# Patient Record
Sex: FEMALE | Race: WHITE | Employment: OTHER | URBAN - METROPOLITAN AREA
[De-identification: names, ages, dates, MRNs, and addresses within clinical notes are randomized per-mention and may not be internally consistent; named-entity substitution may affect disease eponyms.]

---

## 2017-01-13 RX ORDER — LANCETS
EACH MISCELLANEOUS
Qty: 100 EACH | Refills: 0 | Status: SHIPPED | OUTPATIENT
Start: 2017-01-13 | End: 2017-10-02 | Stop reason: ALTCHOICE

## 2017-02-14 DIAGNOSIS — I10 ESSENTIAL HYPERTENSION: ICD-10-CM

## 2017-02-14 DIAGNOSIS — E11.21 DIABETIC NEPHROPATHY ASSOCIATED WITH TYPE 2 DIABETES MELLITUS (HCC): ICD-10-CM

## 2017-02-14 RX ORDER — LISINOPRIL 10 MG/1
10 TABLET ORAL DAILY
Qty: 30 TABLET | Refills: 3 | Status: SHIPPED | OUTPATIENT
Start: 2017-02-14 | End: 2017-06-30 | Stop reason: SDUPTHER

## 2017-06-30 DIAGNOSIS — E11.21 DIABETIC NEPHROPATHY ASSOCIATED WITH TYPE 2 DIABETES MELLITUS (HCC): ICD-10-CM

## 2017-06-30 DIAGNOSIS — I10 ESSENTIAL HYPERTENSION: ICD-10-CM

## 2017-06-30 RX ORDER — LISINOPRIL 10 MG/1
TABLET ORAL
Qty: 30 TABLET | Refills: 0 | Status: SHIPPED | OUTPATIENT
Start: 2017-06-30 | End: 2017-08-06 | Stop reason: SDUPTHER

## 2017-09-05 DIAGNOSIS — E11.21 DIABETIC NEPHROPATHY ASSOCIATED WITH TYPE 2 DIABETES MELLITUS (HCC): ICD-10-CM

## 2017-09-05 DIAGNOSIS — I10 ESSENTIAL HYPERTENSION: ICD-10-CM

## 2017-09-05 RX ORDER — LISINOPRIL 10 MG/1
TABLET ORAL
Qty: 30 TABLET | Refills: 0 | Status: SHIPPED | OUTPATIENT
Start: 2017-09-05 | End: 2017-10-09 | Stop reason: SDUPTHER

## 2017-10-05 ENCOUNTER — OFFICE VISIT (OUTPATIENT)
Dept: FAMILY MEDICINE CLINIC | Age: 82
End: 2017-10-05

## 2017-10-05 VITALS
SYSTOLIC BLOOD PRESSURE: 146 MMHG | WEIGHT: 108.2 LBS | HEIGHT: 60 IN | DIASTOLIC BLOOD PRESSURE: 70 MMHG | BODY MASS INDEX: 21.24 KG/M2

## 2017-10-05 DIAGNOSIS — Z23 NEEDS FLU SHOT: ICD-10-CM

## 2017-10-05 DIAGNOSIS — I42.0 CONGESTIVE CARDIOMYOPATHY (HCC): ICD-10-CM

## 2017-10-05 DIAGNOSIS — I35.0 AORTIC VALVE STENOSIS, UNSPECIFIED ETIOLOGY: ICD-10-CM

## 2017-10-05 DIAGNOSIS — I10 ESSENTIAL HYPERTENSION: Primary | ICD-10-CM
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
## 2017-10-05 DIAGNOSIS — L89.311 DECUBITUS ULCER OF RIGHT BUTTOCK, STAGE 1: ICD-10-CM

## 2017-10-05 DIAGNOSIS — Z09 HOSPITAL DISCHARGE FOLLOW-UP: ICD-10-CM

## 2017-10-05 PROCEDURE — 1123F ACP DISCUSS/DSCN MKR DOCD: CPT | Performed by: FAMILY MEDICINE

## 2017-10-05 PROCEDURE — G8484 FLU IMMUNIZE NO ADMIN: HCPCS | Performed by: FAMILY MEDICINE

## 2017-10-05 PROCEDURE — 4040F PNEUMOC VAC/ADMIN/RCVD: CPT | Performed by: FAMILY MEDICINE

## 2017-10-05 PROCEDURE — G8427 DOCREV CUR MEDS BY ELIG CLIN: HCPCS | Performed by: FAMILY MEDICINE

## 2017-10-05 PROCEDURE — 1090F PRES/ABSN URINE INCON ASSESS: CPT | Performed by: FAMILY MEDICINE

## 2017-10-05 PROCEDURE — 90662 IIV NO PRSV INCREASED AG IM: CPT | Performed by: FAMILY MEDICINE

## 2017-10-05 PROCEDURE — 1036F TOBACCO NON-USER: CPT | Performed by: FAMILY MEDICINE

## 2017-10-05 PROCEDURE — 99214 OFFICE O/P EST MOD 30 MIN: CPT | Performed by: FAMILY MEDICINE

## 2017-10-05 PROCEDURE — G8420 CALC BMI NORM PARAMETERS: HCPCS | Performed by: FAMILY MEDICINE

## 2017-10-05 PROCEDURE — G0008 ADMIN INFLUENZA VIRUS VAC: HCPCS | Performed by: FAMILY MEDICINE

## 2017-10-05 RX ORDER — FUROSEMIDE 20 MG/1
20 TABLET ORAL DAILY
Qty: 60 TABLET | Refills: 3 | Status: SHIPPED | OUTPATIENT
Start: 2017-10-05 | End: 2018-06-02 | Stop reason: SDUPTHER

## 2017-10-05 ASSESSMENT — PATIENT HEALTH QUESTIONNAIRE - PHQ9
SUM OF ALL RESPONSES TO PHQ9 QUESTIONS 1 & 2: 2
1. LITTLE INTEREST OR PLEASURE IN DOING THINGS: 1
SUM OF ALL RESPONSES TO PHQ QUESTIONS 1-9: 2
2. FEELING DOWN, DEPRESSED OR HOPELESS: 1

## 2017-10-05 ASSESSMENT — ENCOUNTER SYMPTOMS
BACK PAIN: 1
SHORTNESS OF BREATH: 1
ABDOMINAL PAIN: 0
CHOKING: 0

## 2017-10-05 NOTE — MR AVS SNAPSHOT
After Visit Summary             Shayla Schultz   10/5/2017 11:15 AM   Office Visit    Description:  Female : 1930   Provider:  Sandy Cuellar DO   Department:  [facility]              Your Follow-Up and Future Appointments         Below is a list of your follow-up and future appointments. This may not be a complete list as you may have made appointments directly with providers that we are not aware of or your providers may have made some for you. Please call your providers to confirm appointments. It is important to keep your appointments. Please bring your current insurance card, photo ID, co-pay, and all medication bottles to your appointment. If self-pay, payment is expected at the time of service. Your To-Do List     Future Appointments Provider Department Dept Phone    10/10/2017 10:30 AM [facility] [phone]    10/18/2017 1:00 PM Mariela Hallman  Family Medicine [phone]    Please arrive 15 minutes prior to appointment, bring photo ID and insurance card. Follow-Up    Return in about 2 weeks (around 10/19/2017). Information from Your Visit        Department     Name Address Phone Fax    [address] [facility] [address] [facility]  [address] [phone]      You Were Seen for:         Felicita 38 discharge follow-up   [[id number]]         Vital Signs     Blood Pressure Height Weight Body Mass Index Smoking Status       146/70 5' (1.524 m) 108 lb 3.2 oz (49.1 kg) 21.13 kg/m2 Never Smoker          Today's Medication Changes          These changes are accurate as of: 10/5/17  4:51 PM.  If you have any questions, ask your nurse or doctor. START taking these medications           collagenase 250 UNIT/GM ointment   Commonly known as:  SANTYL   Instructions:  Apply topically every three days   Quantity:  30 g   Refills:  1   Started by:   Sandy Cuellar DO furosemide 20 MG tablet   Commonly known as:  LASIX   Instructions: Take 1 tablet by mouth daily   Quantity:  60 tablet   Refills:  3   Started by: Minnie Marshall, DO            Where to Get Your Medications      These medications were sent to [facility], [address] [place] [address] [phone]  [address] [facility], [address] [place] [id number]     Phone:  [phone]     collagenase 250 UNIT/GM ointment    furosemide 20 MG tablet               Your Current Medications Are              furosemide (LASIX) 20 MG tablet Take 1 tablet by mouth daily    collagenase (SANTYL) 250 UNIT/GM ointment Apply topically every three days    Acetaminophen-Codeine (TYLENOL WITH CODEINE #3 PO) Take by mouth    Elastic Bandages & Supports (MEDICAL COMPRESSION STOCKINGS) MISC 20-30 mmHg compressions strength. Dispense and Fit. 1 box    lisinopril (PRINIVIL;ZESTRIL) 10 MG tablet TAKE 1 TABLET BY MOUTH DAILY    glucose blood VI test strips (ACCU-CHEK EFE PLUS) strip 1 each by In Vitro route daily As needed. Allergies           No Known Allergies      We Ordered/Performed the following           Echocardiogram complete     INFLUENZA, HIGH DOSE, 65 YRS +, IM, PF, PREFILL SYR, 0.5ML (FLUZONE HD)     [facility]     Scheduling Instructions:    Please call the number below to schedule an appointment. [facility]  [place] [address]   Telephone: [phone]  Fax: [phone]    Comments: The patient can be scheduled with any member of the group, including the provider with the first available appointments.           Additional Information        Basic Information     Date Of Birth Sex Race Ethnicity Preferred Language    6/26/1930 Female White Unavailable/Unknown English      Problem List as of 10/5/2017  Date Reviewed: 10/7/2016                Aortic valve stenosis    Essential hypertension

## 2017-10-05 NOTE — PROGRESS NOTES
Vaccine Information Sheet, \"Influenza - Inactivated\"  given to Amina Davison, or parent/legal guardian of  Amina Davison and verbalized understanding. Patient responses:    Have you ever had a reaction to a flu vaccine? No  Are you able to eat eggs without adverse effects? Yes  Do you have any current illness? No  Have you ever had [place] Syndrome? No    Flu vaccine given per order. Please see immunization tab.
visit:    Hospital discharge follow-up    Congestive cardiomyopathy ([facility] [address] needs echo for lvf  -     Echocardiogram complete    Lasix started    Aortic valve stenosis, unspecified etiology, marked systolic murmur  -     Echocardiogram complete    Falls, subsequent encounter  -     [facility]    Essential hypertension, sub optimal control    Needs flu shot    Decubitus ulcer of right buttock, stage 1    Santyl cream to wounds    Padding     Avoid sitting h]darlin surfaces   improve diet and nutrition                  Plan:       On the basis of positive falls risk screening, assessment and plan is as follows: home safety tips provided, referral to physical medicine provided for [address]  Concern for poss ischemic cardio vs advanced aortic stenosis  rto 2 week

## 2017-10-10 ENCOUNTER — HOSPITAL ENCOUNTER (OUTPATIENT)
Dept: NON INVASIVE DIAGNOSTICS | Age: 82
Discharge: HOME | End: 2017-10-10
Attending: FAMILY MEDICINE | Admitting: FAMILY MEDICINE

## 2017-10-10 DIAGNOSIS — I42.0 DILATED CARDIOMYOPATHY (HCC): ICD-10-CM

## 2017-10-10 LAB
LV EF: 60 %
LVEF MODALITY: NORMAL

## 2017-10-17 DIAGNOSIS — I35.0 AORTIC VALVE STENOSIS, ETIOLOGY OF CARDIAC VALVE DISEASE UNSPECIFIED: Primary | ICD-10-CM

## 2017-10-18 ENCOUNTER — OFFICE VISIT (OUTPATIENT)
Dept: FAMILY MEDICINE CLINIC | Age: 82
End: 2017-10-18

## 2017-10-18 VITALS
HEIGHT: 60 IN | BODY MASS INDEX: 21.2 KG/M2 | DIASTOLIC BLOOD PRESSURE: 70 MMHG | SYSTOLIC BLOOD PRESSURE: 160 MMHG | WEIGHT: 108 LBS

## 2017-10-18 DIAGNOSIS — Z23 NEED FOR PNEUMOCOCCAL VACCINATION: ICD-10-CM

## 2017-10-18 DIAGNOSIS — I35.0 NONRHEUMATIC AORTIC VALVE STENOSIS: Primary | ICD-10-CM

## 2017-10-18 DIAGNOSIS — I10 ESSENTIAL HYPERTENSION: ICD-10-CM

## 2017-10-18 PROCEDURE — G8484 FLU IMMUNIZE NO ADMIN: HCPCS | Performed by: FAMILY MEDICINE

## 2017-10-18 PROCEDURE — 99214 OFFICE O/P EST MOD 30 MIN: CPT | Performed by: FAMILY MEDICINE

## 2017-10-18 PROCEDURE — G8420 CALC BMI NORM PARAMETERS: HCPCS | Performed by: FAMILY MEDICINE

## 2017-10-18 PROCEDURE — 90732 PPSV23 VACC 2 YRS+ SUBQ/IM: CPT | Performed by: FAMILY MEDICINE

## 2017-10-18 PROCEDURE — 1123F ACP DISCUSS/DSCN MKR DOCD: CPT | Performed by: FAMILY MEDICINE

## 2017-10-18 PROCEDURE — G0009 ADMIN PNEUMOCOCCAL VACCINE: HCPCS | Performed by: FAMILY MEDICINE

## 2017-10-18 PROCEDURE — 1090F PRES/ABSN URINE INCON ASSESS: CPT | Performed by: FAMILY MEDICINE

## 2017-10-18 PROCEDURE — 4040F PNEUMOC VAC/ADMIN/RCVD: CPT | Performed by: FAMILY MEDICINE

## 2017-10-18 PROCEDURE — G8427 DOCREV CUR MEDS BY ELIG CLIN: HCPCS | Performed by: FAMILY MEDICINE

## 2017-10-18 PROCEDURE — 1036F TOBACCO NON-USER: CPT | Performed by: FAMILY MEDICINE

## 2017-10-18 ASSESSMENT — ENCOUNTER SYMPTOMS
SHORTNESS OF BREATH: 1
CHEST TIGHTNESS: 0

## 2017-10-18 NOTE — PROGRESS NOTES
Subjective:      Patient ID: Lisa Breaux is a 80 y.o. female.     HPI    Review of Systems    Objective:   Physical Exam    Assessment:            Plan:

## 2017-10-18 NOTE — PROGRESS NOTES
Subjective:      Patient ID: Aye Don is a 80 y.o. female. HPI  2 week follow up from recent hospitalization   echo completed recently found severe AS with normal LV size and function, gradient 43 mmHg    EF 60%  Cont have general weakness with little stamina   denies actual chest pain   pedal edema improved with diuretic    Current Outpatient Prescriptions   Medication Sig      lisinopril (PRINIVIL;ZESTRIL) 10 MG tablet TAKE 1 TABLET BY MOUTH DAILY      furosemide (LASIX) 20 MG tablet Take 1 tablet by mouth daily      Acetaminophen-Codeine (TYLENOL WITH CODEINE #3 PO) Take by mouth      Elastic Bandages & Supports (MEDICAL COMPRESSION STOCKINGS) MISC 20-30 mmHg compressions strength. Dispense and Fit. 1 box      glucose blood VI test strips (ACCU-CHEK EFE PLUS) strip 1 each by In Vitro route daily As needed. No current facility-administered medications for this visit. Review of Systems   Constitutional: Positive for fatigue. Respiratory: Positive for shortness of breath. Negative for chest tightness. Cardiovascular: Negative for chest pain. Musculoskeletal: Positive for arthralgias. Neurological: Positive for weakness. A complete 14 point  review of systems was completed; pertinent positives are noted in HPI    Vitals:    10/18/17 1254   BP: (!) 160/70   Weight: 108 lb (49 kg)   Height: 5' (1.524 m)     Body mass index is 21.09 kg/m². Wt Readings from Last 3 Encounters:   10/18/17 108 lb (49 kg)   10/05/17 108 lb 3.2 oz (49.1 kg)   10/02/17 112 lb 7 oz (51 kg)     BP Readings from Last 3 Encounters:   10/18/17 (!) 160/70   10/05/17 (!) 146/70   10/02/17 (!) 193/86        BP (!) 160/70   Ht 5' (1.524 m)   Wt 108 lb (49 kg)   BMI 21.09 kg/m²     Objective:   Physical Exam   Constitutional: She is oriented to person, place, and time. No distress.    HENT:   Mouth/Throat: Oropharynx is clear and moist.   Eyes: Conjunctivae are normal.   Cardiovascular: Normal rate and regular rhythm. Murmur heard. Mild pedal edema   Pulmonary/Chest: Effort normal.   Abdominal: Soft. Neurological: She is alert and oriented to person, place, and time. Assessment:      Assessment/Plan:  Raymundo Rojas was seen today for follow-up. Diagnoses and all orders for this visit:    Nonrheumatic aortic valve stenosis  -     Yonny Morales MD    Essential hypertension    Need for pneumococcal vaccination  -     Pneumococcal polysaccharide vaccine 23-valent greater than or equal to 3yo subcutaneous/IM            Plan:       Will ask patient to see dr Donny Armstrong reg options given the AS     EF preserved  Cont meds   pneuovax given  Spent 30 minutes with patient and/or family in which greater than half the time was focused on counseling and educating patient and/or  family regarding current health isues     Daughter present

## 2017-10-26 ENCOUNTER — OFFICE VISIT (OUTPATIENT)
Dept: CARDIOLOGY CLINIC | Age: 82
End: 2017-10-26

## 2017-10-26 VITALS
OXYGEN SATURATION: 98 % | SYSTOLIC BLOOD PRESSURE: 128 MMHG | BODY MASS INDEX: 20.22 KG/M2 | WEIGHT: 103 LBS | HEART RATE: 75 BPM | HEIGHT: 60 IN | DIASTOLIC BLOOD PRESSURE: 82 MMHG

## 2017-10-26 DIAGNOSIS — I10 ESSENTIAL HYPERTENSION: ICD-10-CM

## 2017-10-26 DIAGNOSIS — I35.0 SEVERE AORTIC STENOSIS: Primary | ICD-10-CM

## 2017-10-26 DIAGNOSIS — R55 SYNCOPE AND COLLAPSE: ICD-10-CM

## 2017-10-26 PROCEDURE — 99205 OFFICE O/P NEW HI 60 MIN: CPT | Performed by: INTERNAL MEDICINE

## 2017-10-26 PROCEDURE — 1123F ACP DISCUSS/DSCN MKR DOCD: CPT | Performed by: INTERNAL MEDICINE

## 2017-10-26 PROCEDURE — 4040F PNEUMOC VAC/ADMIN/RCVD: CPT | Performed by: INTERNAL MEDICINE

## 2017-10-26 PROCEDURE — G8484 FLU IMMUNIZE NO ADMIN: HCPCS | Performed by: INTERNAL MEDICINE

## 2017-10-26 PROCEDURE — 1090F PRES/ABSN URINE INCON ASSESS: CPT | Performed by: INTERNAL MEDICINE

## 2017-10-26 PROCEDURE — G8427 DOCREV CUR MEDS BY ELIG CLIN: HCPCS | Performed by: INTERNAL MEDICINE

## 2017-10-26 PROCEDURE — 1036F TOBACCO NON-USER: CPT | Performed by: INTERNAL MEDICINE

## 2017-10-26 PROCEDURE — G8420 CALC BMI NORM PARAMETERS: HCPCS | Performed by: INTERNAL MEDICINE

## 2017-10-26 PROCEDURE — 93000 ELECTROCARDIOGRAM COMPLETE: CPT | Performed by: INTERNAL MEDICINE

## 2017-10-26 NOTE — PROGRESS NOTES
syncope, heart racing, palpitations, dizziness, lightheaded, PND, orthopnea, bilateral lower extremities pain, cramping or fatigue. Past Medical History:   has a past medical history of Hypertension. Surgical History:   has no past surgical history on file. Social History:   reports that she has never smoked. She does not have any smokeless tobacco history on file. Family History:  family history is not on file. Home Medications:  Were reviewed and are listed in nursing record and/or below  Prior to Admission medications    Medication Sig Start Date End Date Taking? Authorizing Provider   lisinopril (PRINIVIL;ZESTRIL) 10 MG tablet TAKE 1 TABLET BY MOUTH DAILY 10/9/17   Rosaura Horner DO   furosemide (LASIX) 20 MG tablet Take 1 tablet by mouth daily 10/5/17   Maggie Vaz DO   Acetaminophen-Codeine (TYLENOL WITH CODEINE #3 PO) Take by mouth    Historical Provider, MD   Elastic Bandages & Supports (MEDICAL COMPRESSION STOCKINGS) MISC 20-30 mmHg compressions strength. Dispense and Fit. 1 box 10/2/17   Maribeth Hammond MD   glucose blood VI test strips (ACCU-CHEK EFE PLUS) strip 1 each by In Vitro route daily As needed. 10/7/16   Maggie Vaz DO        CURRENT Medications:    No current facility-administered medications for this visit. Allergies:  Review of patient's allergies indicates no known allergies. Review of Systems:     Constitutional: negative  Eyes: negative  Ears, nose, mouth, throat, and face: negative  Respiratory: negative  Cardiovascular: negative  Gastrointestinal: negative  Genitourinary:negative  Integument/breast: negative  Hematologic/lymphatic: negative  Musculoskeletal:negative  Neurological: negative  Behavioral/Psych: negative  Endocrine: negative  Allergic/Immunologic: negative           Objective:     PHYSICAL EXAM:      There were no vitals filed for this visit.  Weight: 103 lb (46.7 kg) (shoes on)       General Appearance:  Alert, cooperative, no regurgitation is present.   Left atrium is of normal size.   Severe Aortic stenosis with mean gradient of 43 mmHg and mild   regurgitation.   Normal right ventricular size. Assessment and Plan     -Severe Aortic Stenosis  Symptomatic   Syncope and collapse-Walking from the kitchen to living room   STS risk 4.931% preliminary without including frailty   Symptomatic     Good functional status: lives alone, light cooking for herself, was doing light house work prior to fall   Drinks ensure shakes-poor appetite   She has been very sedentary since syncope with right arm fx. She also reports recent forgetfulness reviewed echo 10/10/17 at length with patient and family   Reviewed echo films  Discussed workup for TAVR vs. SAVR   BP stable, on lisinopril with hx of HTN  Lasix 20 mg daily initiated earlier this month for BLE edema  Compression stockings in place   I have asked her to walk around her house more to get stronger  Work on a heart healthy diet with good clean protein   I discussed with her proceeding with Left heart cath with aortic valve study   Patient and family is agreeable   I had a detail discussion with the patient and the family members regarding the risk and benefit of the procedures. I explained to them the details of the procedure. I explained to them that the procedure will be performed using moderate sedation and local anaesthesia using lidocaine. I explained any risk of bleeding, perforation of the vessel, stroke, myocardial infarction or death. The patient and the family members understood the risk and benefits and the details of procedure and would like to go ahead with the procedure. The patient gave informed consent. After procedure     Thank you for allowing us to participate in the care of Samy Khan. Please do not hesitate to contact me if you have any questions.     Poonam Arevalo MD, MPH    [address]

## 2017-10-26 NOTE — PATIENT INSTRUCTIONS
colds and flu. Get a pneumococcal vaccine shot. If you have had one before, ask your doctor if you need another dose. Get a flu vaccine every year. · Take care of your teeth and gums. Get regular dental checkups. Good dental health is important because bacteria can spread from infected teeth and gums to the heart valves. When should you call for help? Call 911 anytime you think you may need emergency care. For example, call if:  · You passed out (lost consciousness). · You have symptoms of a heart attack. These may include:  ¨ Chest pain or pressure, or a strange feeling in the chest.  ¨ Sweating. ¨ Shortness of breath. ¨ Nausea or vomiting. ¨ Pain, pressure, or a strange feeling in the back, neck, jaw, or upper belly or in one or both shoulders or arms. ¨ Lightheadedness or sudden weakness. ¨ A fast or irregular heartbeat. After you call 911, the  may tell you to chew 1 adult-strength or 2 to 4 low-dose aspirin. Wait for an ambulance. Do not try to drive yourself. Call your doctor now or seek immediate medical care if:  · You develop new symptoms of aortic valve stenosis, such as chest pain, dizziness, or shortness of breath. · You have new or increased shortness of breath. · You are dizzy or lightheaded, or you feel like you may faint. · You have sudden weight gain, such as more than 2 to 3 pounds in a day or 5 pounds in a week. (Your doctor may suggest a different range of weight gain.)  · You have increased swelling in your legs, ankles, or feet. Watch closely for changes in your health, and be sure to contact your doctor if:  · You have trouble making healthy lifestyle changes. · You want more information about healthy lifestyle changes. Where can you learn more? Go to https://SquadMailpenathaliaeb.Formotus. org and sign in to your Wochacha account. Enter Q616 in the BioGasol box to learn more about \"Aortic Valve Stenosis: Care Instructions. \"     If you do not have an inside the catheter. The doctor then moves the new valve into the damaged aortic valve. The artificial valve expands and takes the place of the damaged aortic valve. Most people will be asleep for the procedure. The surgery usually takes about 2 to 3 hours. You will have to stay in the hospital for several days after the procedure. What can you expect after TAVR? · While you are in the hospital, your doctors and nurses will monitor you to check how the new valve is working. · You will receive information from the hospital about diet, activities, and medicine. · You will need to have regular checkups with your doctor. · When you leave the hospital, your doctor may give you a blood thinner for a few months to prevent blood clots. If you get a blood thinner, be sure you get instructions about how to take your medicine safely. Blood thinners can cause serious bleeding problems. Follow-up care is a key part of your treatment and safety. Be sure to make and go to all appointments, and call your doctor if you are having problems. It's also a good idea to know your test results and keep a list of the medicines you take. Where can you learn more? Go to https://Amazing HiringpeOppten.Songwhale. org and sign in to your ConnectionPlus account. Enter M741 in the Rolocule Games box to learn more about \"Learning About Transcatheter Aortic Valve Replacement (TAVR). \"     If you do not have an account, please click on the \"Sign Up Now\" link. Current as of: April 3, 2017  Content Version: 11.3  © 1691-0121 [employer]. Care instructions adapted under license by [facility] ([facility]). If you have questions about a medical condition or this instruction, always ask your healthcare professional. [place] [address] any warranty or liability for your use of this information. Patient Education        Aortic Valve Replacement: Before Your Surgery  What is aortic valve replacement?     Aortic valve replacement gives you a new aortic heart valve. The new valve may be mechanical or made of animal tissue, often from a pig. Your doctor will talk with you before surgery about which type of valve is best for you. The aortic valve opens and closes to keep blood flowing in the proper direction through your heart. When the aortic valve does not close properly, it's called aortic valve regurgitation. If the valve is very tight and narrow, it's called aortic valve stenosis. In both of these cases, blood does not flow through the heart the right way. You will be asleep during the surgery. Your doctor will make a cut in the skin over your breastbone (sternum). This cut is called an incision. Then the doctor will cut through your sternum to reach your heart. The doctor will connect you to a heart-lung bypass machine. It adds oxygen to your blood and moves the blood through your body. This machine will allow the doctor to stop your heartbeat and replace the valve. After the doctor has replaced your aortic valve, he or she will restart your heartbeat. Then the doctor will use wire to put your sternum back together. Your incision will be closed with stitches or staples. The wire will stay in your chest. The incision will leave a scar that may fade with time. You will stay in the hospital for 3 to 8 days after surgery. Follow-up care is a key part of your treatment and safety. Be sure to make and go to all appointments, and call your doctor if you are having problems. It's also a good idea to know your test results and keep a list of the medicines you take. What happens before surgery? Surgery can be stressful. This information will help you understand what you can expect. And it will help you safely prepare for surgery. Preparing for surgery  · Understand exactly what surgery is planned, along with the risks, benefits, and other options. · Tell your doctors ALL the medicines, vitamins, supplements, and herbal remedies you take. Some of these can increase the risk of bleeding or interact with anesthesia. · If you take blood thinners, such as warfarin (Coumadin), clopidogrel (Plavix), or aspirin, be sure to talk to your doctor. He or she will tell you if you should stop taking these medicines before your surgery. Make sure that you understand exactly what your doctor wants you to do. · Your doctor will tell you which medicines to take or stop before your surgery. You may need to stop taking certain medicines a week or more before surgery. So talk to your doctor as soon as you can. · If you have an advance directive, let your doctor know. It may include a living will and a durable power of  for health care. Bring a copy to the hospital. If you don't have one, you may want to prepare one. It lets your doctor and loved ones know your health care wishes. Doctors advise that everyone prepare these papers before any type of surgery or procedure. What happens on the day of surgery? · Follow the instructions exactly about when to stop eating and drinking. If you don't, your surgery may be canceled. If your doctor told you to take your medicines on the day of surgery, take them with only a sip of water. · Take a bath or shower before you come in for your surgery. Do not apply lotions, perfumes, deodorants, or nail polish. · Do not shave the surgical site yourself. · Take off all jewelry and piercings. And take out contact lenses, if you wear them. At the hospital or surgery center  · Bring a picture ID. · The area for surgery is often marked to make sure there are no errors. · You will be kept comfortable and safe by your anesthesia provider. You will be asleep during the surgery. · The surgery will take about 3 to 5 hours. After surgery  · You will go to the intensive care unit (ICU) right after surgery. You will probably stay in the ICU for 1 or 2 days before you go to your regular hospital room.   · You will have a breathing the \"Sign Up Now\" link. Current as of: April 3, 2017  Content Version: 11.3  © 6978-1441 [employer]. Care instructions adapted under license by [facility] ([facility]). If you have questions about a medical condition or this instruction, always ask your healthcare professional. [address] any warranty or liability for your use of this information. Patient Education        Left Heart Catheterization: About This Test  What is it? Cardiac catheterization is a test to check the left side of your heart. Your doctor might look at the shape of your heart, the motion of your heart, or the blood pressure inside the chambers. Why is this test done? This test gives information about how your heart is working. It can:  · Check blood flow and blood pressure in the chambers of the heart. · Check the pumping action of the heart. · Find out if a heart defect is present and how severe it is. · Find out how well the heart valves work. What happens during the test?  · You will get medicine to help you relax. · A thin tube called a catheter is put into a blood vessel in the groin or the arm. The doctor moves the catheter through the blood vessel into your heart. · You will get a shot to numb the skin where the catheter goes in. You may feel pressure when the doctor moves the catheter through your blood vessel into your heart. · Dye may be injected into your heart. Your doctor can watch on special monitors as the dye moves in your heart. The dye helps your doctor see blood flow in your heart. · You may feel hot or flushed for several seconds when the dye is put in.  · If a heart defect is found, cardiac catheterization sometimes is used to correct it during the test.  How long does it take? · The test will take about 30 minutes. If a problem is found and the doctor treats it, it can take a few hours longer.   What happens after the test?  · You will stay in a room for at least a few hours to make sure the catheter site starts to heal. You may have a bandage or a compression device on your groin or arm to prevent bleeding. · If the catheter was placed in your groin, you may lie in bed for a few hours. If the catheter was put in your arm, you will need to keep your arm still for at least 1 hour. · You may or may not need to stay in the hospital overnight. You will get more instructions for what to do at home. · Drink plenty of fluids for several hours after the test.  Follow-up care is a key part of your treatment and safety. Be sure to make and go to all appointments, and call your doctor if you are having problems. It's also a good idea to know your test results and keep a list of the medicines you take. Where can you learn more? Go to https://IMRICOR MEDICAL SYSTEMSpeHachimenroppi.Imaginatik. org and sign in to your OrangeSoda account. Enter W306 in the Doctors Together box to learn more about \"Left Heart Catheterization: About This Test.\"     If you do not have an account, please click on the \"Sign Up Now\" link. Current as of: January 12, 2017  Content Version: 11.3  © 2885-0047 [email or web address]. Care instructions adapted under license by [facility] ([facility]). If you have questions about a medical condition or this instruction, always ask your healthcare professional. [place] [address] any warranty or liability for your use of this information. Patient Education        Learning About Percutaneous Coronary Intervention  What is percutaneous coronary intervention? Percutaneous coronary intervention (PCI) is the name for procedures to open a blocked coronary artery. The two most common are coronary angioplasty and coronary stent placement. A PCI is a way to open a blocked coronary artery before, during, or after a heart attack. It gets blood flowing to your heart. And it can help prevent heart problems by widening an artery that has been narrowed by fatty buildup (plaque).  This also take.  Where can you learn more? Go to https://chpepiceweb.Percentil. org and sign in to your [employer] account. Enter N176 in the mCASH box to learn more about \"Learning About Percutaneous Coronary Intervention. \"     If you do not have an account, please click on the \"Sign Up Now\" link. Current as of: February 23, 2017  Content Version: 11.3  © 9641-3474 [employer]. Care instructions adapted under license by [facility] ([facility]). If you have questions about a medical condition or this instruction, always ask your healthcare professional. [address] any warranty or liability for your use of this information.

## 2017-10-27 ENCOUNTER — TELEPHONE (OUTPATIENT)
Dept: CARDIOLOGY CLINIC | Age: 82
End: 2017-10-27

## 2017-10-27 NOTE — TELEPHONE ENCOUNTER
Procedure scheduled  11/3/17  2:00pm        Pt to arrive & report to [facility] cath lab  12:30pm  Pre procedure labs due before 11/3    NPO 6:30am day of  Please take 4  81 mg Baby Aspirins in the am along with your other med's with sip of water   at discharge  If you go home the same day as your procedure someone needs to stay with you overnight.      Patient and her 2 sons expressed understanding

## 2017-10-30 DIAGNOSIS — R55 SYNCOPE AND COLLAPSE: ICD-10-CM

## 2017-10-30 DIAGNOSIS — I35.0 SEVERE AORTIC STENOSIS: ICD-10-CM

## 2017-10-30 LAB
ANION GAP SERPL CALCULATED.3IONS-SCNC: 15 MMOL/L (ref 3–16)
BUN BLDV-MCNC: 14 MG/DL (ref 7–20)
CALCIUM SERPL-MCNC: 9.3 MG/DL (ref 8.3–10.6)
CHLORIDE BLD-SCNC: 99 MMOL/L (ref 99–110)
CO2: 30 MMOL/L (ref 21–32)
CREAT SERPL-MCNC: 1 MG/DL (ref 0.6–1.2)
GFR AFRICAN AMERICAN: >60
GFR NON-AFRICAN AMERICAN: 52
GLUCOSE BLD-MCNC: 142 MG/DL (ref 70–99)
HCT VFR BLD CALC: 39.4 % (ref 36–48)
HEMOGLOBIN: 13.4 G/DL (ref 12–16)
INR BLD: 1.12 (ref 0.85–1.15)
MCH RBC QN AUTO: 31.2 PG (ref 26–34)
MCHC RBC AUTO-ENTMCNC: 34 G/DL (ref 31–36)
MCV RBC AUTO: 91.6 FL (ref 80–100)
PDW BLD-RTO: 13.6 % (ref 12.4–15.4)
PLATELET # BLD: 218 K/UL (ref 135–450)
PMV BLD AUTO: 10.5 FL (ref 5–10.5)
POTASSIUM SERPL-SCNC: 3.2 MMOL/L (ref 3.5–5.1)
PROTHROMBIN TIME: 12.6 SEC (ref 9.6–13)
RBC # BLD: 4.31 M/UL (ref 4–5.2)
SODIUM BLD-SCNC: 144 MMOL/L (ref 136–145)
WBC # BLD: 10.3 K/UL (ref 4–11)

## 2017-11-01 DIAGNOSIS — E87.6 HYPOKALEMIA: Primary | ICD-10-CM

## 2017-11-01 RX ORDER — POTASSIUM CHLORIDE 20 MEQ/1
20 TABLET, EXTENDED RELEASE ORAL DAILY
Qty: 31 TABLET | Refills: 3 | Status: ON HOLD | OUTPATIENT
Start: 2017-11-01 | End: 2017-11-28 | Stop reason: HOSPADM

## 2017-11-03 ENCOUNTER — HOSPITAL ENCOUNTER (OUTPATIENT)
Dept: CARDIAC CATH/INVASIVE PROCEDURES | Age: 82
Discharge: HOME | End: 2017-11-03
Attending: INTERNAL MEDICINE | Admitting: INTERNAL MEDICINE

## 2017-11-03 LAB
PERFORMED ON: ABNORMAL
POC POTASSIUM: 3.2 MMOL/L (ref 3.5–5.1)
POC SAMPLE TYPE: ABNORMAL

## 2017-11-03 PROCEDURE — 93458 L HRT ARTERY/VENTRICLE ANGIO: CPT | Performed by: INTERNAL MEDICINE

## 2017-11-03 PROCEDURE — 99152 MOD SED SAME PHYS/QHP 5/>YRS: CPT | Performed by: INTERNAL MEDICINE

## 2017-11-03 RX ORDER — SODIUM CHLORIDE 0.9 % (FLUSH) 0.9 %
10 SYRINGE (ML) INJECTION PRN
Status: DISCONTINUED | OUTPATIENT
Start: 2017-11-03 | End: 2017-11-04 | Stop reason: HOSPADM

## 2017-11-03 RX ORDER — SODIUM CHLORIDE 9 MG/ML
INJECTION, SOLUTION INTRAVENOUS CONTINUOUS
Status: DISCONTINUED | OUTPATIENT
Start: 2017-11-03 | End: 2017-11-04 | Stop reason: HOSPADM

## 2017-11-03 RX ORDER — ACETAMINOPHEN 325 MG/1
650 TABLET ORAL EVERY 4 HOURS PRN
Status: DISCONTINUED | OUTPATIENT
Start: 2017-11-03 | End: 2017-11-04 | Stop reason: HOSPADM

## 2017-11-03 RX ORDER — SODIUM CHLORIDE 0.9 % (FLUSH) 0.9 %
10 SYRINGE (ML) INJECTION EVERY 12 HOURS SCHEDULED
Status: DISCONTINUED | OUTPATIENT
Start: 2017-11-03 | End: 2017-11-04 | Stop reason: HOSPADM

## 2017-11-03 RX ORDER — ONDANSETRON 2 MG/ML
4 INJECTION INTRAMUSCULAR; INTRAVENOUS EVERY 6 HOURS PRN
Status: DISCONTINUED | OUTPATIENT
Start: 2017-11-03 | End: 2017-11-04 | Stop reason: HOSPADM

## 2017-11-03 RX ORDER — ASPIRIN 325 MG
325 TABLET ORAL ONCE
Status: DISCONTINUED | OUTPATIENT
Start: 2017-11-03 | End: 2017-11-03

## 2017-11-03 NOTE — ANESTHESIA PRE-OP
Brief Pre-Op Note/Sedation Assessment      [address]  6/26/1930  Room/bed info not found  [id number]  2:09 PM    Planned Procedure: Cardiac Catheterization Procedure    Post Procedure Plan: Return to same level of care    Consent: I have discussed with the patient and/or the patient representative the indication, alternatives, and the possible risks and/or complications of the planned procedure and the anesthesia methods. The patient and/or patient representative appear to understand and agree to proceed. Chief Complaint: Dyspnea  Pre-Op Clearance      Indications for the Procedure:   CAD Presentation:  for AVR  Anginal Classification within 2 weeks:  CCS II - Slight limitation, with angina only during vigorous physical activity  NYHA Heart Failure Class within 2 weeks: Class II - Symptoms of HF on ordinary exertion       Anti- Anginal Meds within 2 weeks:   ANTI-ANGINAL MEDS: Yes: none      Stress or Imaging Studies Performed:  None    Vital Signs: There were no vitals taken for this visit. Allergies:  No Known Allergies    Past Medical History:  Past Medical History:   Diagnosis Date    Hypertension          Surgical History:  No past surgical history on file. Medications:  Current Outpatient Prescriptions   Medication Sig Dispense Refill    potassium chloride (KLOR-CON M) 20 MEQ extended release tablet Take 1 tablet by mouth daily With first dose 40 meq by mouth one time only for hypokalemia. 31 tablet 3    lisinopril (PRINIVIL;ZESTRIL) 10 MG tablet TAKE 1 TABLET BY MOUTH DAILY 30 tablet 0    furosemide (LASIX) 20 MG tablet Take 1 tablet by mouth daily 60 tablet 3    Acetaminophen-Codeine (TYLENOL WITH CODEINE #3 PO) Take by mouth      Elastic Bandages & Supports (MEDICAL COMPRESSION STOCKINGS) MISC 20-30 mmHg compressions strength. Dispense and Fit. 1 box 1 each 2    glucose blood VI test strips (ACCU-CHEK EFE PLUS) strip 1 each by In Vitro route daily As needed.  100 each 3 Current Facility-Administered Medications   Medication Dose Route Frequency Provider Last Rate Last Dose    0.9 % sodium chloride infusion   Intravenous Continuous Gayle Ewing MD        sodium chloride flush 0.9 % injection 10 mL  10 mL Intravenous 2 times per day Gayle Ewing MD        sodium chloride flush 0.9 % injection 10 mL  10 mL Intravenous PRN Gayle Ewing MD               Pre-Sedation:    Pre-Sedation Documentation and Exam:  I have personally completed a history, physical exam & review of systems for this patient (see notes). Prior History of Anesthesia Complications:   none    Modified Mallampati:  II (soft palate, uvula, fauces visible)    ASA Classification:  Class 2 - A normal healthy patient with mild systemic disease and Class 2 -- A normal healthy patient with mild systemic disease    Ashlee Scale: Activity:  2 - Able to move 4 extremities voluntarily on command  Respiration:  2 - Able to breathe deeply and cough freely  Circulation:  2 - BP+/- 20mmHg of normal  Consciousness:  2 - Fully awake  Oxygen Saturation (color):  2 - Able to maintain oxygen saturation >92% on room air    Sedation/Anesthesia Plan:  Guard the patient's safety and welfare. Minimize physical discomfort and pain. Minimize negative psychological responses to treatment by providing sedation and analgesia and maximize the potential amnesia. Patient to meet pre-procedure discharge plan.     Medication Planned:  midazolam intravenously, fentanyl intravenously and midazolam intravenously, fentanyl intravenously    Patient is an appropriate candidate for plan of sedation: yes      Electronically signed by Gayle Ewing MD on 11/3/2017 at 2:09 PM

## 2017-11-03 NOTE — PROGRESS NOTES
At the request of Dr. Sherrell Mckeon, came to see Mrs. Liu to discuss TAVR. Aware of cath results. I discussed severe AS, treatment options--med management vs SAVR vs TAVR. STS score is 8.65 (including proposed PCI for CAD). This would make her high risk SAVR. I spoke to her son and daughter at length about TAVR. Discussed workup tests, CVTS consults as well as timing of TAVR post PCI. They are agreeable to proceed with workup. I will coordinate all testing and appointments and will contact daughter, Sunday Mis, as to dates and times. Left my contact information with family and encouraged them to call me with any questions/concerns.  Nidhi CHENG

## 2017-11-03 NOTE — H&P
History of Present Illness:     Jose Carvalho is a 80 y.o. patient with PMH significant for HTN. Recent syncope and collapse. Occurred 6 weeks ago. Hairline fracture of her humerus bone, immobilizer. Did not tell anyone initially. Treated at [place] [address]. BLE edema, L>R. New onset. Started on Lasix 20 mg daily 10/5/17. Complaints of dizziness with position changes. Takes her time changing positions. Lives on one level, along. Accompanied by 2 sons today and do not notice any HAN. Walks slowly with complaints of weakness over this month but does not reports any cardiac complaints. She is sitting more this month secondary to right arm injury. Using a cane for walking aid. She does note recent forgetfulness. Poor appetite. Cooks light meals for herself, kids will bring over some food. Patient denies any symptoms of chest pain, pressure, tightness, HAN, shortness of breath at rest, nausea, vomiting, near syncope, syncope, heart racing, palpitations, dizziness, lightheaded, PND, orthopnea, bilateral lower extremities pain, cramping or fatigue. She is only on lisinropril for HTN. Tolerating. No abnormal bruising or bleeding.         Patient give history of      · Chest pain, tightness or discomfort. · Mild Shortness of breath. · No Palpitations. · Had Syncope ('blackouts', 'faints', 'collapse') or dizziness.   · No Related cardiovascular history, including transient ischemic attacks, stroke, peripheral arterial disease and peripheral edema  · No Patient comorbidities, including coronary ischemia, cardiomyopathy, congestive heart failure, severe lung disease, and chronic kidney disease        Patient denies any symptoms of chest pain, pressure, tightness, HAN, shortness of breath at rest, nausea, vomiting, near syncope, syncope, heart racing, palpitations, dizziness, lightheaded, PND, orthopnea, bilateral lower extremities pain, cramping or fatigue.         Past Medical History:   has a past medical history of Hypertension.     Surgical History:   has no past surgical history on file.      Social History:   reports that she has never smoked. She does not have any smokeless tobacco history on file.      Family History:  family history is not on file.     Home Medications:  Were reviewed and are listed in nursing record and/or below  Home Medications           Prior to Admission medications    Medication Sig Start Date End Date Taking? Authorizing Provider   lisinopril (PRINIVIL;ZESTRIL) 10 MG tablet TAKE 1 TABLET BY MOUTH DAILY 10/9/17     Marta Frost,    furosemide (LASIX) 20 MG tablet Take 1 tablet by mouth daily 10/5/17     Felix Evans DO   Acetaminophen-Codeine (TYLENOL WITH CODEINE #3 PO) Take by mouth       Historical Provider, MD   Elastic Bandages & Supports (MEDICAL COMPRESSION STOCKINGS) MISC 20-30 mmHg compressions strength. Dispense and Fit. 1 box 10/2/17     Miguel A Trevino MD   glucose blood VI test strips (ACCU-CHEK EFE PLUS) strip 1 each by In Vitro route daily As needed. 10/7/16     Felix Evans DO            CURRENT Medications:    Current Meds Link used for Sign Out Report      No current facility-administered medications for this visit.         Allergies:  Review of patient's allergies indicates no known allergies.      Review of Systems:      Constitutional: negative  Eyes: negative  Ears, nose, mouth, throat, and face: negative  Respiratory: negative  Cardiovascular: negative  Gastrointestinal: negative  Genitourinary:negative  Integument/breast: negative  Hematologic/lymphatic: negative  Musculoskeletal:negative  Neurological: negative  Behavioral/Psych: negative  Endocrine: negative  Allergic/Immunologic: negative               Objective:      PHYSICAL EXAM:       There were no vitals filed for this visit. Weight: 103 lb (46.7 kg) (shoes on)       General Appearance:  Alert, cooperative, no distress, appears stated age.    Head:  Normocephalic, without obvious abnormality, atraumatic. Eyes:  Pupils equal and round. No scleral icterus. Mouth: Moist mucosa, no pharyngeal erythema. Nose: Nares normal. No drainage or sinus tenderness. Neck: Supple, symmetrical, trachea midline. No adenopathy. No tenderness/mass/nodules. No carotid bruit or elevated JVD. Lungs:   Clear to auscultation bilaterally, respirations unlabored. No wheeze, rales, or rhonchi. Chest Wall:  No tenderness or deformity. Heart:  Regular rate, S1/ soft S2 normal. 3/6 systolic murmur, rub, or gallop. Abdomen:   Soft, non-tender, bowel sounds active. Musculoskeletal: No muscle wasting or digital clubbing. Extremities: Extremities normal, atraumatic. 2+ edema. Pulses: 2+ radial and pedal pulses, symmetric. Skin: No rashes or lesions. Pysch: Normal mood and affect. Alert and oriented x 4. Neurologic: Normal gross motor and sensory exam.       Labs            CBC: Lab Results   Component Value Date     WBC 7.2 10/02/2017     RBC 4.25 10/02/2017     HGB 13.0 10/02/2017     HCT 39.1 10/02/2017     MCV 92.1 10/02/2017     RDW 13.6 10/02/2017      10/02/2017      CMP:        Lab Results   Component Value Date      10/02/2017     K 4.4 10/02/2017      10/02/2017     CO2 27 10/02/2017     BUN 14 10/02/2017     CREATININE 0.7 10/02/2017     GFRAA >60 10/02/2017     AGRATIO 1.0 10/02/2017     LABGLOM >60 10/02/2017     GLUCOSE 110 10/02/2017     PROT 7.2 10/02/2017     CALCIUM 9.6 10/02/2017     BILITOT 0.7 10/02/2017     ALKPHOS 92 10/02/2017     AST 23 10/02/2017     ALT 9 10/02/2017      PT/INR:  No results found for: PTINR  HgBA1c:        Lab Results   Component Value Date     LABA1C 6.0 10/07/2016            Lab Results   Component Value Date     TROPONINI <0.01 10/02/2017         Cardiac, Vascular and Imaging Data      EKG: NSR LVH      Echo: Normal LV size and systolic function: EF is    60%.  Grade I diastolic   dysfunction.   Trivial mitral & tricuspid regurgitation is present.   Left atrium is of normal size.   Severe Aortic stenosis with mean gradient of 43 mmHg and mild   regurgitation.   Normal right ventricular size.        Assessment and Plan      -Severe Aortic Stenosis  Symptomatic   Syncope and collapse-Walking from the kitchen to living room   STS risk 4.931% preliminary without including frailty   Symptomatic      Good functional status: lives alone, light cooking for herself, was doing light house work prior to fall   Drinks ensure shakes-poor appetite   She has been very sedentary since syncope with right arm fx. She also reports recent forgetfulness reviewed echo 10/10/17 at length with patient and family   Reviewed echo films  Discussed workup for TAVR vs. SAVR   BP stable, on lisinopril with hx of HTN  Lasix 20 mg daily initiated earlier this month for BLE edema  Compression stockings in place   I have asked her to walk around her house more to get stronger  Work on a heart healthy diet with good clean protein   I discussed with her proceeding with Left heart cath with aortic valve study   Patient and family is agreeable   I had a detail discussion with the patient and the family members regarding the risk and benefit of the procedures. I explained to them the details of the procedure. I explained to them that the procedure will be performed using moderate sedation and local anaesthesia using lidocaine. I explained any risk of bleeding, perforation of the vessel, stroke, myocardial infarction or death. The patient and the family members understood the risk and benefits and the details of procedure and would like to go ahead with the procedure. The patient gave informed consent.     After procedure      Thank you for allowing us to participate in the care of Eve Tong.   Please do not hesitate to contact me if you have any questions.     Carlitos Rondon MD, MPH

## 2017-11-03 NOTE — PROCEDURES
LHC and AV study     Detail report dictated     -Severe AS with mean gradient of 43 mmHG    -LCX 90%    -D1 and D 2 80% stenosis    -Ostial RCA 60% stenosis     Deandre Sifuentes MD

## 2017-11-06 PROCEDURE — 93010 ELECTROCARDIOGRAM REPORT: CPT | Performed by: INTERNAL MEDICINE

## 2017-11-07 DIAGNOSIS — R42 DIZZINESS: ICD-10-CM

## 2017-11-07 DIAGNOSIS — I35.0 NONRHEUMATIC AORTIC VALVE STENOSIS: Primary | ICD-10-CM

## 2017-11-07 LAB
EKG ATRIAL RATE: 94 BPM
EKG DIAGNOSIS: NORMAL
EKG P AXIS: 86 DEGREES
EKG P-R INTERVAL: 198 MS
EKG Q-T INTERVAL: 370 MS
EKG QRS DURATION: 92 MS
EKG QTC CALCULATION (BAZETT): 462 MS
EKG R AXIS: -12 DEGREES
EKG T AXIS: 111 DEGREES
EKG VENTRICULAR RATE: 94 BPM

## 2017-11-10 ENCOUNTER — TELEPHONE (OUTPATIENT)
Dept: CARDIOTHORACIC SURGERY | Age: 82
End: 2017-11-10

## 2017-11-10 NOTE — TELEPHONE ENCOUNTER
Spoke with patients pascual Lin @ [phone] regarding schedule of TAVR consults on 12/7/2017 @ 9:15 am with Dr. Margarito Argueta and on 12/11/2017 @ 9:00 am with Dr. Molina Brady @ [address] [place] [address]. Patient given address and phone number in case there are questions or concerns.

## 2017-11-17 ENCOUNTER — OFFICE VISIT (OUTPATIENT)
Dept: CARDIOLOGY CLINIC | Age: 82
End: 2017-11-17

## 2017-11-17 VITALS
WEIGHT: 103 LBS | OXYGEN SATURATION: 98 % | HEART RATE: 82 BPM | DIASTOLIC BLOOD PRESSURE: 62 MMHG | BODY MASS INDEX: 18.25 KG/M2 | SYSTOLIC BLOOD PRESSURE: 118 MMHG | HEIGHT: 63 IN

## 2017-11-17 DIAGNOSIS — R42 LIGHTHEADED: ICD-10-CM

## 2017-11-17 DIAGNOSIS — R53.83 FATIGUE, UNSPECIFIED TYPE: ICD-10-CM

## 2017-11-17 DIAGNOSIS — R53.1 WEAKNESS: ICD-10-CM

## 2017-11-17 DIAGNOSIS — I35.0 SEVERE AORTIC STENOSIS: ICD-10-CM

## 2017-11-17 DIAGNOSIS — I10 ESSENTIAL HYPERTENSION: ICD-10-CM

## 2017-11-17 DIAGNOSIS — I25.119 CORONARY ARTERY DISEASE INVOLVING NATIVE CORONARY ARTERY OF NATIVE HEART WITH ANGINA PECTORIS (HCC): Primary | ICD-10-CM

## 2017-11-17 PROCEDURE — 4040F PNEUMOC VAC/ADMIN/RCVD: CPT | Performed by: INTERNAL MEDICINE

## 2017-11-17 PROCEDURE — G8484 FLU IMMUNIZE NO ADMIN: HCPCS | Performed by: INTERNAL MEDICINE

## 2017-11-17 PROCEDURE — 1123F ACP DISCUSS/DSCN MKR DOCD: CPT | Performed by: INTERNAL MEDICINE

## 2017-11-17 PROCEDURE — G8419 CALC BMI OUT NRM PARAM NOF/U: HCPCS | Performed by: INTERNAL MEDICINE

## 2017-11-17 PROCEDURE — 1036F TOBACCO NON-USER: CPT | Performed by: INTERNAL MEDICINE

## 2017-11-17 PROCEDURE — 1090F PRES/ABSN URINE INCON ASSESS: CPT | Performed by: INTERNAL MEDICINE

## 2017-11-17 PROCEDURE — G8427 DOCREV CUR MEDS BY ELIG CLIN: HCPCS | Performed by: INTERNAL MEDICINE

## 2017-11-17 PROCEDURE — 99215 OFFICE O/P EST HI 40 MIN: CPT | Performed by: INTERNAL MEDICINE

## 2017-11-17 PROCEDURE — G8598 ASA/ANTIPLAT THER USED: HCPCS | Performed by: INTERNAL MEDICINE

## 2017-11-17 RX ORDER — CLOPIDOGREL BISULFATE 75 MG/1
TABLET ORAL
Qty: 38 TABLET | Refills: 3 | Status: ON HOLD | OUTPATIENT
Start: 2017-11-17 | End: 2017-11-28 | Stop reason: HOSPADM

## 2017-11-17 NOTE — PROGRESS NOTES
[facility]  Cardiology Consult    Samy Khan  6/26/1930 November 17, 2017    Reason for Referral: Severe Aortic Valve Stenosis     CC: \"fatigue and weakness\"       Subjective:     History of Present Illness:    Samy Khan is a 80 y.o. patient with PMH significant for HTN. Recent syncope and collapse. Occurred 6 weeks ago. Hairline fracture of her humerus bone, immobilizer. Did not tell anyone initially. Treated at [place] [address]. BLE edema, L>R. New onset. Started on Lasix 20 mg daily 10/5/17. Complaints of dizziness with position changes. Takes her time changing positions. Lives on one level, along. Accompanied by 2 sons today and do not notice any HAN. Walks slowly with complaints of weakness over this month but does not reports any cardiac complaints. She is sitting more this month secondary to right arm injury. Using a cane for walking aid. She does note recent forgetfulness. Poor appetite. Cooks light meals for herself, kids will bring over some food. Patient denies any symptoms of chest pain, pressure, tightness, HAN, shortness of breath at rest, nausea, vomiting, near syncope, syncope, heart racing, palpitations, dizziness, lightheaded, PND, orthopnea, bilateral lower extremities pain, cramping or fatigue. She is only on lisinropril for HTN. Tolerating. No abnormal bruising or bleeding. Presents today in follow up from [facility] with aortic valve study 11/3/17 and was found to have severe stenosis of the left circumflex and diagonal branch as well as confirming severe aortic stenosis. She is accompanied by her daughter today. TAVR workup already underway. Brissa Sampson, TAVR RN and have imaging and CVTS evaluation appointment. Constipation this week but she states that I finally went today and struggled today but finally went today. She states that she did strain and has been fatigued, weakened since.  She only has eaten a donut thus far today and doesn't think she drank enough. She denies any further near syncope or syncope. She reports compliance with medical therapy and tolerating. No abnormal bruising or bleeding. She is trying to walk more around the house to build up strength. She has also added ensure/boost to her daily diet. Patient give history of      No Chest pain, tightness or discomfort.  No Mild Shortness of breath.  No Palpitations.  No recurrence of syncope ('blackouts', 'faints', 'collapse') or dizziness.  No Related cardiovascular history, including transient ischemic attacks, stroke, peripheral arterial disease and peripheral edema   No Patient comorbidities, including coronary ischemia, cardiomyopathy, congestive heart failure, severe lung disease, and chronic kidney disease    Patient denies any symptoms of chest pain, pressure, tightness, HAN, shortness of breath at rest, nausea, vomiting, near syncope, syncope, heart racing, palpitations, dizziness, lightheaded, PND, orthopnea, bilateral lower extremities pain, cramping or fatigue. Past Medical History:   has a past medical history of Hypertension. Surgical History:   has no past surgical history on file. Social History:   reports that she has never smoked. She does not have any smokeless tobacco history on file. Family History:  family history is not on file. Home Medications:  Were reviewed and are listed in nursing record and/or below  Prior to Admission medications    Medication Sig Start Date End Date Taking? Authorizing Provider   lisinopril (PRINIVIL;ZESTRIL) 10 MG tablet TAKE 1 TABLET BY MOUTH DAILY 11/11/17   Roxana Lyons,    potassium chloride (KLOR-CON M) 20 MEQ extended release tablet Take 1 tablet by mouth daily With first dose 40 meq by mouth one time only for hypokalemia.  11/1/17   Rhonda Cunningham, NANCY   furosemide (LASIX) 20 MG tablet Take 1 tablet by mouth daily 10/5/17   Dinorah Rubio, DO   Acetaminophen-Codeine (TYLENOL WITH CODEINE #3 PO) Take by mouth    Historical Provider, MD   Elastic Bandages & Supports (MEDICAL COMPRESSION STOCKINGS) MISC 20-30 mmHg compressions strength. Dispense and Fit. 1 box 10/2/17   Skip Dorman MD   glucose blood VI test strips (ACCU-CHEK EFE PLUS) strip 1 each by In Vitro route daily As needed. 10/7/16   Sena Laurent DO        CURRENT Medications:    No current facility-administered medications for this visit. Allergies:  Review of patient's allergies indicates no known allergies. Review of Systems:     Constitutional: positive for fatigue and weakness  Eyes: negative  Ears, nose, mouth, throat, and face: negative  Respiratory: negative  Cardiovascular: negative  Gastrointestinal: negative  Genitourinary:negative  Integument/breast: negative  Hematologic/lymphatic: negative  Musculoskeletal:negative  Neurological: negative  Behavioral/Psych: negative  Endocrine: negative  Allergic/Immunologic: negative           Objective:     PHYSICAL EXAM:      Vitals:    11/17/17 1240   BP: 118/62   Pulse: 82   SpO2: 98%    Weight: 103 lb (46.7 kg) (shoes on)       General Appearance:  Alert, cooperative, no distress, appears stated age. Head:  Normocephalic, without obvious abnormality, atraumatic. Eyes:  Pupils equal and round. No scleral icterus. Mouth: Moist mucosa, no pharyngeal erythema. Nose: Nares normal. No drainage or sinus tenderness. Neck: Supple, symmetrical, trachea midline. No adenopathy. No tenderness/mass/nodules. No carotid bruit or elevated JVD. Lungs:   Clear to auscultation bilaterally, respirations unlabored. No wheeze, rales, or rhonchi. Chest Wall:  No tenderness or deformity. Heart:  Regular rate, S1/ soft S2 normal. 3/6 systolic murmur, rub, or gallop. Abdomen:   Soft, non-tender, bowel sounds active. Musculoskeletal: No muscle wasting or digital clubbing. Extremities: Extremities normal, atraumatic. 2+ edema. Pulses: 2+ radial and pedal pulses, symmetric.    Skin: No rashes or lesions. Pysch: Normal mood and affect. Alert and oriented x 4. Neurologic: Normal gross motor and sensory exam.       Labs       Lab Results   Component Value Date    WBC 10.3 10/30/2017    RBC 4.31 10/30/2017    HGB 13.4 10/30/2017    HCT 39.4 10/30/2017    MCV 91.6 10/30/2017    RDW 13.6 10/30/2017     10/30/2017     Lab Results   Component Value Date     10/30/2017    K 3.2 10/30/2017    CL 99 10/30/2017    CO2 30 10/30/2017    BUN 14 10/30/2017    CREATININE 1.0 10/30/2017    GFRAA >60 10/30/2017    AGRATIO 1.0 10/02/2017    LABGLOM 52 10/30/2017    GLUCOSE 142 10/30/2017    PROT 7.2 10/02/2017    CALCIUM 9.3 10/30/2017    BILITOT 0.7 10/02/2017    ALKPHOS 92 10/02/2017    AST 23 10/02/2017    ALT 9 10/02/2017    No results found for: PTINR  Lab Results   Component Value Date    LABA1C 6.0 10/07/2016     Lab Results   Component Value Date    TROPONINI <0.01 10/02/2017       Cardiac, Vascular and Imaging Data     EKG: NSR LVH     Echo: Normal LV size and systolic function: EF is    60%. Grade I diastolic   dysfunction.   Trivial mitral & tricuspid regurgitation is present.   Left atrium is of normal size.   Severe Aortic stenosis with mean gradient of 43 mmHg and mild   regurgitation.   Normal right ventricular size. [facility] with aortic valve study:11/3/17  ANGIOGRAPHIC FINDINGS:  1. Left main comes from left coronary cusp is normal, ROSA III flow. No significant stenosis. 2.  Left anterior descending artery comes from left main coronary. No  significant stenosis. ROSA III flow. Second diagonal had 80%  stenosis. The first diagonal branch had 80% stenosis. 3.  The left circumflex comes from left main coronary, midportion has  tandem 90% stenosis present. 4.  Right coronary comes from right coronary cusp with a little bit of  anterior take off. The ostium has 60% stenosis present.   It is  codominant, giving rise to right posterior descending artery posterolateral

## 2017-11-17 NOTE — PATIENT INSTRUCTIONS
Patient Education        Coronary Angiogram: About This Test  What is a coronary angiogram?    A coronary angiogram is a test to look at the large blood vessels of your heart (coronary arteries). These blood vessels feed blood, oxygen, and nutrients to your heart. Why is this test done? This test is done to check blood flow in your coronary arteries. It can show the size and location of narrowed or blocked sections of an artery. It's done for people who have coronary artery disease, also known as heart disease. The test can show how serious the disease is and how best to treat it. Or it can be done for people who have symptoms of heart disease to find out if there is a problem with the artery. What happens during the test?  · You will get medicine to help you relax. · A thin tube called a catheter is put into a blood vessel in your groin or arm. · You will get a shot to numb the skin where the catheter goes in. You may feel pressure when the doctor moves the catheter through your blood vessel into your heart. · Dye is put into your coronary arteries through the catheter. Your doctor can see the dye as it moves through the arteries. This lets your doctor look for areas that are narrowed or blocked. · You may feel hot or flushed for several seconds when the dye is put in. How long does it take? · The test will take about 30 minutes. But you need time to get ready for it and time to recover. If a problem is found and the doctor treats it, it can take a few hours longer. What happens after the test?  · You will stay in a room for at least a few hours to make sure the catheter site starts to heal. You may have a bandage or a compression device on your groin or arm to prevent bleeding. · If the catheter was placed in your groin, you may lie in bed for a few hours. If the catheter was put in your arm, you will need to keep your arm still for at least one hour.   · You may or may not need to stay in the hospital overnight. You will get more instructions for what to do at home. · Drink plenty of fluids for several hours after the test.  Follow-up care is a key part of your treatment and safety. Be sure to make and go to all appointments, and call your doctor if you are having problems. It's also a good idea to know your test results and keep a list of the medicines you take. Where can you learn more? Go to https://Allied Resource CorporationpeFabewWhipCar.MailPix. org and sign in to your The History Press account. Enter L182 in the Sentient box to learn more about \"Coronary Angiogram: About This Test.\"     If you do not have an account, please click on the \"Sign Up Now\" link. Current as of: January 12, 2017  Content Version: 11.3  © 7328-2997 [employer] Software. Care instructions adapted under license by [facility] [place] ([facility]). If you have questions about a medical condition or this instruction, always ask your healthcare professional. [place] [address] any warranty or liability for your use of this information. Patient Education        Learning About Percutaneous Coronary Intervention  What is percutaneous coronary intervention? Percutaneous coronary intervention (PCI) is the name for procedures to open a blocked coronary artery. The two most common are coronary angioplasty and coronary stent placement. A PCI is a way to open a blocked coronary artery before, during, or after a heart attack. It gets blood flowing to your heart. And it can help prevent heart problems by widening an artery that has been narrowed by fatty buildup (plaque). This also may be called balloon angioplasty. Before a PCI, a doctor does a test to find blocked arteries. The test is called cardiac catheterization. The doctor puts a tiny tube called a catheter into an artery in your groin or arm. The doctor moves the catheter through the artery to your heart. Then he or she puts a dye into the catheter.  This makes your heart's bandage on the opening. Then a bandage or a compression device may be placed on your groin or wrist at the catheter insertion site. This prevents bleeding. After the test, you will be taken to a room where the catheter site and your heart rate, blood pressure, and temperature will be checked several times. If the catheter was put in your groin, you will need to lie still and keep your leg straight for several hours. If the catheter was put in your arm, you may be able to sit up and get out of bed right away. But you will need to keep your arm still for at least one hour. The average hospital stay is 1 to 2 days for most procedures. When you go home, you will get instructions from your doctor to help you recover well and prevent problems. Make sure to drink plenty of fluids (unless your doctor tells you not to) for several hours after the test. This will help flush the dye out of your body. Your doctor will prescribe blood-thinning medicines. You will likely take aspirin plus another blood thinner. It is very important that you take these medicines exactly as directed. They help keep the coronary artery open and reduce your risk of a heart attack. If you have this procedure, you will still need to make lifestyle changes like eating healthy, being active, and not smoking. This will give you the best chance for a longer, healthier life. Follow-up care is a key part of your treatment and safety. Be sure to make and go to all appointments, and call your doctor if you are having problems. It's also a good idea to know your test results and keep a list of the medicines you take. Where can you learn more? Go to https://chula.[employer]man Instruments. org and sign in to your Phizzle account. Enter O490 in the uTrack TV box to learn more about \"Learning About Percutaneous Coronary Intervention. \"     If you do not have an account, please click on the \"Sign Up Now\" link.   Current as of: February 23, 2017  Content Version: 11.3  © 5771-0631 Jeeves. Care instructions adapted under license by [facility] [place] ([facility]). If you have questions about a medical condition or this instruction, always ask your healthcare professional. [address] any warranty or liability for your use of this information. Patient Education        Angina: Care Instructions  Your Care Instructions    You have a problem called angina. Angina happens when there is not enough blood flow to your heart muscle. Angina is a sign of coronary artery disease (CAD). CAD occurs when blood vessels that supply the heart become narrowed. Having CAD increases your risk of a heart attack. Chest pain or pressure is the most common symptom of angina. But some people have other symptoms, like:  · Pain, pressure, or a strange feeling in the back, neck, jaw, or upper belly, or in one or both shoulders or arms. · Shortness of breath. · Nausea or vomiting. · Lightheadedness or sudden weakness. · Fast or irregular heartbeat. Women are somewhat more likely than men to have angina symptoms like shortness of breath, nausea, and back or jaw pain. Angina can be dangerous. That's why it is important to pay attention to your symptoms. Know what is typical for you, learn how to control your symptoms, and understand when you need to get treatment. A change in your usual pattern of symptoms is an emergency. It may mean that you are having a heart attack. The doctor has checked you carefully, but problems can develop later. If you notice any problems or new symptoms, get medical treatment right away. Follow-up care is a key part of your treatment and safety. Be sure to make and go to all appointments, and call your doctor if you are having problems. It's also a good idea to know your test results and keep a list of the medicines you take. How can you care for yourself at home?   Medicines  · If your doctor has given you nitroglycerin for angina symptoms, keep it with you at all times. If you have symptoms, sit down and rest, and take the first dose of nitroglycerin as directed. If your symptoms get worse or are not getting better within 5 minutes, call 911 right away. Stay on the phone. The emergency  will give you further instructions. · If your doctor advises it, take 1 low-dose aspirin a day to prevent heart attack. · Be safe with medicines. Take your medicines exactly as prescribed. Call your doctor if you think you are having a problem with your medicine. You will get more details on the specific medicines your doctor prescribes. Lifestyle changes  · Do not smoke. If you need help quitting, talk to your doctor about stop-smoking programs and medicines. These can increase your chances of quitting for good. · Eat a heart-healthy diet that is low in saturated fat and salt, and is high in fiber. Talk to your doctor or a dietitian about healthy eating. · Stay at a healthy weight. Or lose weight if you need to. Activity  · Talk to your doctor about a level of activity that is safe for you. · If an activity causes angina symptoms, stop and rest.  When should you call for help? Call 911 anytime you think you may need emergency care. For example, call if:  · You passed out (lost consciousness). · You have symptoms of a heart attack. These may include:  ¨ Chest pain or pressure, or a strange feeling in the chest.  ¨ Sweating. ¨ Shortness of breath. ¨ Nausea or vomiting. ¨ Pain, pressure, or a strange feeling in the back, neck, jaw, or upper belly or in one or both shoulders or arms. ¨ Lightheadedness or sudden weakness. ¨ A fast or irregular heartbeat. After you call 911, the  may tell you to chew 1 adult-strength or 2 to 4 low-dose aspirin. Wait for an ambulance. Do not try to drive yourself.   · You have angina symptoms that do not go away with rest or are not getting better within 5 minutes after you surgery is not done. TAVR is a newer procedure. How well it works long-term is not known yet. And TAVR can cause serious problems. These include stroke, a heart attack during the procedure, or even death. TAVR may be a good option for a person who cannot have surgery or for a person who has a high risk of serious problems from open-heart surgery. For example, you might think about TAVR if you are not healthy enough for an open-heart surgery. TAVR may not be a good choice if an open-heart surgery is likely to be successful. A team of doctors will use professional guidelines to decide whether or not TAVR is a good choice for you. How is TAVR done? TAVR is often done through an incision (cut) in the groin. But sometimes a small cut is made in the chest. The doctor uses a tube called a catheter and special tools that fit inside the catheter. The doctor puts the catheter into a blood vessel and moves it through the blood vessel and into the heart. A specially designed artificial valve fits inside the catheter. The doctor then moves the new valve into the damaged aortic valve. The artificial valve expands and takes the place of the damaged aortic valve. Most people will be asleep for the procedure. The surgery usually takes about 2 to 3 hours. You will have to stay in the hospital for several days after the procedure. What can you expect after TAVR? · While you are in the hospital, your doctors and nurses will monitor you to check how the new valve is working. · You will receive information from the hospital about diet, activities, and medicine. · You will need to have regular checkups with your doctor. · When you leave the hospital, your doctor may give you a blood thinner for a few months to prevent blood clots. If you get a blood thinner, be sure you get instructions about how to take your medicine safely. Blood thinners can cause serious bleeding problems.   Follow-up care is a key part of your treatment and safety. Be sure to make and go to all appointments, and call your doctor if you are having problems. It's also a good idea to know your test results and keep a list of the medicines you take. Where can you learn more? Go to https://chula.relocality. org and sign in to your VisualShare account. Enter N957 in the Kaleio box to learn more about \"Learning About Transcatheter Aortic Valve Replacement (TAVR). \"     If you do not have an account, please click on the \"Sign Up Now\" link. Current as of: April 3, 2017  Content Version: 11.3  © 2712-4391 3C Plus, CreoPop. Care instructions adapted under license by [address] If you have questions about a medical condition or this instruction, always ask your healthcare professional. [address] any warranty or liability for your use of this information.

## 2017-11-21 ENCOUNTER — TELEPHONE (OUTPATIENT)
Dept: CARDIOLOGY CLINIC | Age: 82
End: 2017-11-21

## 2017-11-24 DIAGNOSIS — I25.119 CORONARY ARTERY DISEASE INVOLVING NATIVE CORONARY ARTERY OF NATIVE HEART WITH ANGINA PECTORIS (HCC): ICD-10-CM

## 2017-11-24 LAB
ANION GAP SERPL CALCULATED.3IONS-SCNC: 16 MMOL/L (ref 3–16)
BUN BLDV-MCNC: 28 MG/DL (ref 7–20)
CALCIUM SERPL-MCNC: 9.6 MG/DL (ref 8.3–10.6)
CHLORIDE BLD-SCNC: 99 MMOL/L (ref 99–110)
CO2: 25 MMOL/L (ref 21–32)
CREAT SERPL-MCNC: 1.1 MG/DL (ref 0.6–1.2)
GFR AFRICAN AMERICAN: 57
GFR NON-AFRICAN AMERICAN: 47
GLUCOSE BLD-MCNC: 111 MG/DL (ref 70–99)
HCT VFR BLD CALC: 42.3 % (ref 36–48)
HEMOGLOBIN: 13.9 G/DL (ref 12–16)
INR BLD: 1.05 (ref 0.85–1.15)
MCH RBC QN AUTO: 30.5 PG (ref 26–34)
MCHC RBC AUTO-ENTMCNC: 32.9 G/DL (ref 31–36)
MCV RBC AUTO: 92.5 FL (ref 80–100)
PDW BLD-RTO: 13.9 % (ref 12.4–15.4)
PLATELET # BLD: 233 K/UL (ref 135–450)
PLATELET SLIDE REVIEW: ADEQUATE
PMV BLD AUTO: 9.4 FL (ref 5–10.5)
POTASSIUM SERPL-SCNC: 5 MMOL/L (ref 3.5–5.1)
PROTHROMBIN TIME: 11.9 SEC (ref 9.6–13)
RBC # BLD: 4.57 M/UL (ref 4–5.2)
SODIUM BLD-SCNC: 140 MMOL/L (ref 136–145)
WBC # BLD: 9 K/UL (ref 4–11)

## 2017-11-27 PROBLEM — I25.119 CORONARY ARTERY DISEASE INVOLVING NATIVE CORONARY ARTERY OF NATIVE HEART WITH ANGINA PECTORIS (HCC): Status: ACTIVE | Noted: 2017-11-27

## 2017-11-27 PROBLEM — I25.10 CAD IN NATIVE ARTERY: Status: ACTIVE | Noted: 2017-11-27

## 2017-11-28 PROBLEM — I25.10 CAD IN NATIVE ARTERY: Chronic | Status: ACTIVE | Noted: 2017-11-27

## 2017-11-28 PROBLEM — I25.119 CORONARY ARTERY DISEASE INVOLVING NATIVE CORONARY ARTERY OF NATIVE HEART WITH ANGINA PECTORIS (HCC): Chronic | Status: ACTIVE | Noted: 2017-11-27

## 2017-11-30 ENCOUNTER — HOSPITAL ENCOUNTER (OUTPATIENT)
Dept: PULMONOLOGY | Age: 82
Discharge: HOME | End: 2017-11-30
Admitting: INTERNAL MEDICINE

## 2017-11-30 ENCOUNTER — OFFICE VISIT (OUTPATIENT)
Dept: CARDIOLOGY CLINIC | Age: 82
End: 2017-11-30

## 2017-11-30 VITALS
HEART RATE: 90 BPM | DIASTOLIC BLOOD PRESSURE: 64 MMHG | HEIGHT: 60 IN | WEIGHT: 102 LBS | SYSTOLIC BLOOD PRESSURE: 137 MMHG | BODY MASS INDEX: 20.03 KG/M2

## 2017-11-30 DIAGNOSIS — I25.10 CORONARY ARTERY DISEASE INVOLVING NATIVE CORONARY ARTERY OF NATIVE HEART WITHOUT ANGINA PECTORIS: ICD-10-CM

## 2017-11-30 DIAGNOSIS — I35.0 NONRHEUMATIC AORTIC VALVE STENOSIS: ICD-10-CM

## 2017-11-30 DIAGNOSIS — E78.00 HYPERCHOLESTEREMIA: ICD-10-CM

## 2017-11-30 DIAGNOSIS — R42 DIZZINESS: ICD-10-CM

## 2017-11-30 DIAGNOSIS — I35.0 NONRHEUMATIC AORTIC VALVE STENOSIS: Primary | ICD-10-CM

## 2017-11-30 DIAGNOSIS — I10 ESSENTIAL HYPERTENSION: ICD-10-CM

## 2017-11-30 LAB
ANION GAP SERPL CALCULATED.3IONS-SCNC: 14 MMOL/L (ref 3–16)
BUN BLDV-MCNC: 22 MG/DL (ref 7–20)
CALCIUM SERPL-MCNC: 9.2 MG/DL (ref 8.3–10.6)
CHLORIDE BLD-SCNC: 97 MMOL/L (ref 99–110)
CO2: 27 MMOL/L (ref 21–32)
CREAT SERPL-MCNC: 1.2 MG/DL (ref 0.6–1.2)
GFR AFRICAN AMERICAN: 51
GFR NON-AFRICAN AMERICAN: 42
GLUCOSE BLD-MCNC: 125 MG/DL (ref 70–99)
POTASSIUM SERPL-SCNC: 3.4 MMOL/L (ref 3.5–5.1)
SODIUM BLD-SCNC: 138 MMOL/L (ref 136–145)

## 2017-11-30 PROCEDURE — 1111F DSCHRG MED/CURRENT MED MERGE: CPT | Performed by: INTERNAL MEDICINE

## 2017-11-30 PROCEDURE — G8598 ASA/ANTIPLAT THER USED: HCPCS | Performed by: INTERNAL MEDICINE

## 2017-11-30 PROCEDURE — G8420 CALC BMI NORM PARAMETERS: HCPCS | Performed by: INTERNAL MEDICINE

## 2017-11-30 PROCEDURE — 1090F PRES/ABSN URINE INCON ASSESS: CPT | Performed by: INTERNAL MEDICINE

## 2017-11-30 PROCEDURE — 99214 OFFICE O/P EST MOD 30 MIN: CPT | Performed by: INTERNAL MEDICINE

## 2017-11-30 PROCEDURE — 1036F TOBACCO NON-USER: CPT | Performed by: INTERNAL MEDICINE

## 2017-11-30 PROCEDURE — G8427 DOCREV CUR MEDS BY ELIG CLIN: HCPCS | Performed by: INTERNAL MEDICINE

## 2017-11-30 PROCEDURE — G8484 FLU IMMUNIZE NO ADMIN: HCPCS | Performed by: INTERNAL MEDICINE

## 2017-11-30 PROCEDURE — 4040F PNEUMOC VAC/ADMIN/RCVD: CPT | Performed by: INTERNAL MEDICINE

## 2017-11-30 PROCEDURE — 1123F ACP DISCUSS/DSCN MKR DOCD: CPT | Performed by: INTERNAL MEDICINE

## 2017-11-30 RX ORDER — ALBUTEROL SULFATE 90 UG/1
4 AEROSOL, METERED RESPIRATORY (INHALATION) ONCE
Status: CANCELLED | OUTPATIENT
Start: 2017-11-30

## 2017-11-30 RX ORDER — METOPROLOL TARTRATE 5 MG/5ML
5 INJECTION INTRAVENOUS ONCE
Status: DISCONTINUED | OUTPATIENT
Start: 2017-11-30 | End: 2017-12-01 | Stop reason: HOSPADM

## 2017-11-30 NOTE — PROGRESS NOTES
Outpatient Visit  Consultation / History and Physical / Followup Visit     Referring Provider:   Kaitlin Burch [place]     [phone]    Encounter Type:   []New Patient  [x]Interventional  [x]Followup Patient [x]Structural      Chief Complaint  Visit [] Acute [x] Chronic     Sx [] None [] CP [x] SOB [] Dizzy  Palps [] Fatigue     Problems AS, CAD, HTN, Chol     HPI  Doing well. Recovering well from stenting. Undergoing TAVR workup. Mild SOB with exertion. CV HPI  Symptom Y N Frequency Duration Severity Modifying Assoc Sx Other   CP [] [x]         SOB [x] [] chronic chronic mild +stress -rest     Dizzy [] [x]         Syncope [] [x]         Palpitations [] [x]         Other [] [x]           Compliance(Check if compliant):   [x]Med [x]Diet [x]Salt [x]Tob [x]Alc [x]Drg [x]Exercise  [x]Counseling given on all above above categories    MedHx:  has a past medical history of Aortic stenosis, severe; CAD (coronary artery disease); and Hypertension. SurgHx:  has no past surgical history on file. SocHx:  reports that she has never smoked. She has never used smokeless tobacco.   FamHx: family history is not on file. CVMeds: Reviewed and will remain unchanged except as mentioned in A/P      Current Outpatient Prescriptions   Medication Sig Dispense Refill    acetaminophen (TYLENOL) 325 MG tablet Take 2 tablets by mouth every 6 hours as needed for Pain or Fever 120 tablet 3    aspirin 81 MG chewable tablet Take 1 tablet by mouth daily 30 tablet 3    atorvastatin (LIPITOR) 80 MG tablet Take 1 tablet by mouth nightly 30 tablet 3    clopidogrel (PLAVIX) 75 MG tablet Take 1 tablet by mouth daily 30 tablet 3    lisinopril (PRINIVIL;ZESTRIL) 10 MG tablet TAKE 1 TABLET BY MOUTH DAILY 30 tablet 0    furosemide (LASIX) 20 MG tablet Take 1 tablet by mouth daily 60 tablet 3    glucose blood VI test strips (ACCU-CHEK EFE PLUS) strip 1 each by In Vitro route daily As needed.  100 each 3     No current facility-administered medications for this visit. Allergies: Review of patient's allergies indicates no known allergies.    ROS:  [x]Full ROS obtained and negative except as mentioned in HPI  Exam:    Vitals:    11/30/17 1224   BP: 137/64   Pulse: 90      Appearance:  Alert, cooperative, no distress, appears stated age   Head:  Normocephalic, without obvious abnormality, atraumatic   Eyes:  PERRL, conjunctiva/corneas clear   Nose: Nares normal, no drainage or sinus tenderness   Throat: Lips, mucosa, and tongue normal   Neck: Supple, symmetrical, trachea midline, no adenopathy, thyroid: not enlarged, symmetric, no tenderness/mass/nodules, no carotid bruit or JVD   Lungs:   Clear to auscultation bilaterally, respirations unlabored   Chest Wall:  No tenderness or deformity   Heart:  RRR, 3/6   Abdomen:   Soft, non-tender, bowel sounds active all four quadrants,  no masses, no organomegaly   Extremities: Extremities normal, atraumatic, no cyanosis or edema   Pulses: 2+ and symmetric   Skin: Skin color, texture, turgor normal, no rashes or lesions   Pysch: Normal mood and affect   Lymph: No cervical or axillary LA   Neurologic: Normal gross motor and sensory exam.      A/P:   ~AS   Date EF Detail   Sx   SOB with exertion   Hx   No intervention   TTE 10/17 60% AS MG 43, mild AI   Plan   Plan for TAVR 1/9/18   ~CAD   Date EF Results   Sx   No concerning   Hx   SVPCI   [facility] 11/17  2 CAITLIN to Cx   Plan   Continue aggressive medical treatment at doses above   ~HTN  Today BP [x] Controlled [] Borderline [] Uncontrolled   Counseling [x] Diet/Salt [x] Exercise [x] Weight    Plan Continue current medications at doses detailed above  ~Hyperchol  Goal LDL [] <100 [x] <70     Counseling [x] Diet [x] Weight [x] Exercise   Lipid/liver Monitor [x] PCP [] Cardio [] Endocrine   Plan Continue current doses of meds listed above  ~Followup  []2 wk   []1m   []3m    []6m   []12m    []PRN  [x]Other: after TAVR testing

## 2017-11-30 NOTE — PROGRESS NOTES
Patient placed on cardiac monitor and BP monitor. Heart rate variable  /87. Purpose of IV medication explained to pt. IV flushed blood return noted Lopressor 5mg given IV. Heart rate and BP monitored. Heart rate in 70's and procedure completed. I spoke to pt and her son to observe her for any problems with dizziness, chest pain ,shortness of breath or visual changes. They verbalized understanding.  Kimberly Live RN

## 2017-12-07 ENCOUNTER — OFFICE VISIT (OUTPATIENT)
Dept: CARDIOTHORACIC SURGERY | Age: 82
End: 2017-12-07

## 2017-12-07 ENCOUNTER — HOSPITAL ENCOUNTER (OUTPATIENT)
Dept: PULMONOLOGY | Age: 82
Discharge: HOME | End: 2017-12-07
Attending: INTERNAL MEDICINE | Admitting: INTERNAL MEDICINE

## 2017-12-07 VITALS
DIASTOLIC BLOOD PRESSURE: 70 MMHG | OXYGEN SATURATION: 98 % | BODY MASS INDEX: 20.5 KG/M2 | HEIGHT: 60 IN | TEMPERATURE: 97.8 F | WEIGHT: 104.4 LBS | HEART RATE: 68 BPM | SYSTOLIC BLOOD PRESSURE: 124 MMHG

## 2017-12-07 VITALS — HEART RATE: 82 BPM | RESPIRATION RATE: 16 BRPM | OXYGEN SATURATION: 95 %

## 2017-12-07 DIAGNOSIS — I35.0 NONRHEUMATIC AORTIC VALVE STENOSIS: Primary | ICD-10-CM

## 2017-12-07 DIAGNOSIS — R06.02 SHORTNESS OF BREATH: ICD-10-CM

## 2017-12-07 PROCEDURE — 99202 OFFICE O/P NEW SF 15 MIN: CPT | Performed by: THORACIC SURGERY (CARDIOTHORACIC VASCULAR SURGERY)

## 2017-12-07 NOTE — PROGRESS NOTES
1+ left pretibial edema -- reportedly markedly improved from presentation    Skin: warm and normal turgor, no ulcers, infections, or rashes, no jaundice    Neurological: awake, alert and oriented x 3, motor 5/5 bilateral upper and lower extremities, sensation grossly intact    Psychiatric: Mood and affect appear appropriate    DATA:    STS score for mortality > 7 now likely due to higher Cr      ASSESSMENT AND PLAN:    Severe symptomatic AS in patient with at least moderate risk for SAVR    Best option TAVR. R/B/ALt/E discussed. Encouraged patient to consider how invasive / heroic she would want for interventions for complication. I answered all the patient and her son's questions. Continue TAVR work-up.     Electronically signed by Vijay Rivera MD on 12/7/2017 at 9:55 AM

## 2017-12-11 ENCOUNTER — OFFICE VISIT (OUTPATIENT)
Dept: CARDIOTHORACIC SURGERY | Age: 82
End: 2017-12-11

## 2017-12-11 VITALS
OXYGEN SATURATION: 100 % | HEART RATE: 74 BPM | BODY MASS INDEX: 20.12 KG/M2 | SYSTOLIC BLOOD PRESSURE: 124 MMHG | WEIGHT: 103 LBS | TEMPERATURE: 98 F | DIASTOLIC BLOOD PRESSURE: 60 MMHG

## 2017-12-11 DIAGNOSIS — E11.21 DIABETIC NEPHROPATHY ASSOCIATED WITH TYPE 2 DIABETES MELLITUS (HCC): ICD-10-CM

## 2017-12-11 DIAGNOSIS — I35.0 NONRHEUMATIC AORTIC VALVE STENOSIS: ICD-10-CM

## 2017-12-11 DIAGNOSIS — I10 ESSENTIAL HYPERTENSION: ICD-10-CM

## 2017-12-11 DIAGNOSIS — I25.119 CORONARY ARTERY DISEASE INVOLVING NATIVE CORONARY ARTERY OF NATIVE HEART WITH ANGINA PECTORIS (HCC): Primary | ICD-10-CM

## 2017-12-11 PROCEDURE — 99214 OFFICE O/P EST MOD 30 MIN: CPT | Performed by: THORACIC SURGERY (CARDIOTHORACIC VASCULAR SURGERY)

## 2017-12-11 NOTE — PROGRESS NOTES
Consultation H&P    Date of Admission:  No admission date for patient encounter. Date of Consultation:  12/11/2017    PCP:  Aristeo Hamm DO    Cardiologist: Benny Bennett    Chief Complaint: Syncope, edema    History of Present Illness: We are asked to see this patient in consultation by Dr. Nichole Sharif regarding 2nd TAVR eval.  Margot Garces is a 80 y.o. female who is been having episodes of syncope and falls with subsequent arm fracture. Her symptoms are shortness of breath that is worse with activity and relieved with rest.  At this point she is limited to just walking around the house and is medically managed for her CHF. She also history has a history of coronary disease status post PCI she is a history of circumflex and diagonal disease    Symptom Y/N Episodes Duration Severity Modifying Assoc Sx Other   CP                SOB           Dizzy           Syncope                Palpitations                Other                         Past Medical History:  Past Medical History:   Diagnosis Date    Anxiety     Aortic stenosis, severe     CAD (coronary artery disease)     CHF (congestive heart failure) (HCC)     Hyperlipidemia     Hypertension        Past Surgical History:  Past Surgical History:   Procedure Laterality Date    BACK SURGERY      CHOLECYSTECTOMY      TONSILLECTOMY         Home Medications:   Prior to Admission medications    Medication Sig Start Date End Date Taking?  Authorizing Provider   acetaminophen (TYLENOL) 325 MG tablet Take 2 tablets by mouth every 6 hours as needed for Pain or Fever 11/28/17  Yes Marcy Smith CNP   aspirin 81 MG chewable tablet Take 1 tablet by mouth daily 11/29/17  Yes Marcy Smith CNP   atorvastatin (LIPITOR) 80 MG tablet Take 1 tablet by mouth nightly 11/28/17  Yes Marcy Smith CNP   clopidogrel (PLAVIX) 75 MG tablet Take 1 tablet by mouth daily 11/29/17  Yes Diane M Enzweiler, CNP   lisinopril (PRINIVIL;ZESTRIL) 10 MG tablet TAKE 1 TABLET BY MOUTH DAILY 11/11/17  Yes Jessica Tinsley DO   furosemide (LASIX) 20 MG tablet Take 1 tablet by mouth daily 10/5/17  Yes Cristian Ingram DO        Facility Administered Medications: Allergies:  Review of patient's allergies indicates no known allergies. Social History:      Social History     Social History    Marital status:      Spouse name: N/A    Number of children: N/A    Years of education: N/A     Occupational History    Not on file. Social History Main Topics    Smoking status: Never Smoker    Smokeless tobacco: Never Used    Alcohol use No    Drug use: No    Sexual activity: Not on file     Other Topics Concern    Not on file     Social History Narrative    No narrative on file       Family History:        Problem Relation Age of Onset    Other Mother     Cancer Sister        Review of Systems:  Constitutional:  No night sweats, headaches. Positive for 20# weight loss in the last year. Had an episode of syncope. Eyes:  No glaucoma, cataracts. Wears glasses  ENMT:  No nosebleeds, deviated septum. No sleep apnea. Cardiac:  No arrhythmias, previous MI. Positive for CAD. Vascular:  No claudication, varicosities. GI:  No PUD, heartburn. Altered bowel pattern. :  No kidney stones, frequent UTIs  Musculoskeletal:  No arthritis, gout. Respiratory:  No SOB, emphysema, asthma. Integumentary:  No dermatitis, itching, rash. Very dry skin. Neurological:  No stroke, TIAs, seizures. Psychiatric:  No depression. Positive for situational anxiety. Endocrine: No diabetes, thyroid issues. Hematologic:  Positive for bleeding, easy bruising related to Plavix. Immunologic:  No known cancer, steroid therapies.       Physical Examination:    Pulse 74   Temp 98 °F (36.7 °C) (Temporal)   Wt 103 lb (46.7 kg)   SpO2 100%   BMI 20.12 kg/m²    BP RUE:  BP LUE:   Admission Weight: 103 lb (46.7 kg)   Hand dominance:    General appearance: NAD  Eyes: PERRLA  Neck: no JVD, no lymphadenopathy. Respiratory: effort is unlabored, no crackles, wheezes or rubs. Cardiovascular: regular, no murmur. No carotid bruits. No edema or varicosities. Abdominal aorta cannot be appreciated given body habitus. Pulses:    carotid brachial radial femoral popliteal DP PT   RIGHT          LEFT          GI: abdomen soft, nondistended, no organomegaly. Musculoskeletal: strength and tone normal.  Extremities: warm and pink. Skin: no dermatitis or ulceration. Neuro/psychiatric: grossly intact. MEDICAL DECISION MAKING/TESTING personally reviewed    Cath: November 2017     ANGIOGRAPHIC FINDINGS:  1. Left main is normal.  2.  Left anterior descending artery comes from the left main coronary. Has  no significant stenosis but has some diagonal disease. 3.  Left circumflex comes from left main coronary. It is codominant and  the proximal to midportion has severely calcified 99% stenosis present as  confirmed by intravascular ultrasound with a lumen diameter of at least 3  mm to 3.2 mm.     INTERVENTION PERFORMED:  We intervened the left circumflex. We performed  orbital atherectomy using 1.25 patria from [facility] .     We placed two stents drug-coated Xience Alpine, 3.0 x 38 mm and then 3.0 x  8 mm, post dilating with a 3.5 mm noncompliant balloon.         Echo: 10/16  Aortic Valve      Peak Velocity: 427 cm/s     Mean Velocity: 308 cm/s   Peak Gradient: 72.93 mmHg   Mean Gradient: 43 mmHg   Area (continuity): 0.82 cm2   AV VTI: 93.5 cm   AI P1/2t: 322 msec         Labs:   CBC: No results for input(s): WBC, HGB, HCT, MCV, PLT in the last 72 hours. BMP: No results for input(s): NA, K, CL, CO2, PHOS, BUN, CREATININE, CALCIUM, MG in the last 72 hours. Cardiac Enzymes: No results for input(s): CKTOTAL, CKMB, CKMBINDEX, TROPONINI in the last 72 hours. PT/INR: No results for input(s): PROTIME, INR in the last 72 hours. APTT: No results for input(s):  APTT in the last 72

## 2017-12-11 NOTE — PROGRESS NOTES
Review of Systems:  Constitutional:  No night sweats, headaches. Positive for 20# weight loss in the last year. Had an episode of syncope. Eyes:  No glaucoma, cataracts. Wears glasses  ENMT:  No nosebleeds, deviated septum. No sleep apnea. Cardiac:  No arrhythmias, previous MI. Positive for CAD. Vascular:  No claudication, varicosities. GI:  No PUD, heartburn. Altered bowel pattern. :  No kidney stones, frequent UTIs  Musculoskeletal:  No arthritis, gout. Respiratory:  No SOB, emphysema, asthma. Integumentary:  No dermatitis, itching, rash. Very dry skin. Neurological:  No stroke, TIAs, seizures. Psychiatric:  No depression. Positive for situational anxiety. Endocrine: No diabetes, thyroid issues. Hematologic:  Positive for bleeding, easy bruising related to Plavix. Immunologic:  No known cancer, steroid therapies.

## 2017-12-14 DIAGNOSIS — I10 ESSENTIAL HYPERTENSION: ICD-10-CM

## 2017-12-14 DIAGNOSIS — E11.21 DIABETIC NEPHROPATHY ASSOCIATED WITH TYPE 2 DIABETES MELLITUS (HCC): ICD-10-CM

## 2017-12-14 RX ORDER — LISINOPRIL 10 MG/1
TABLET ORAL
Qty: 30 TABLET | Refills: 0 | Status: SHIPPED | OUTPATIENT
Start: 2017-12-14 | End: 2018-01-23 | Stop reason: SDUPTHER

## 2018-01-04 ENCOUNTER — HOSPITAL ENCOUNTER (OUTPATIENT)
Dept: OTHER | Age: 83
Discharge: HOME | End: 2018-01-04
Attending: INTERNAL MEDICINE | Admitting: INTERNAL MEDICINE

## 2018-01-04 ENCOUNTER — OFFICE VISIT (OUTPATIENT)
Dept: CARDIOLOGY CLINIC | Age: 83
End: 2018-01-04

## 2018-01-04 VITALS
SYSTOLIC BLOOD PRESSURE: 172 MMHG | DIASTOLIC BLOOD PRESSURE: 74 MMHG | HEART RATE: 80 BPM | BODY MASS INDEX: 20.03 KG/M2 | WEIGHT: 102 LBS | HEIGHT: 60 IN

## 2018-01-04 DIAGNOSIS — E78.00 HYPERCHOLESTEREMIA: ICD-10-CM

## 2018-01-04 DIAGNOSIS — I25.10 CORONARY ARTERY DISEASE INVOLVING NATIVE CORONARY ARTERY OF NATIVE HEART WITHOUT ANGINA PECTORIS: ICD-10-CM

## 2018-01-04 DIAGNOSIS — I10 ESSENTIAL HYPERTENSION: ICD-10-CM

## 2018-01-04 DIAGNOSIS — I35.0 NONRHEUMATIC AORTIC VALVE STENOSIS: Primary | ICD-10-CM

## 2018-01-04 LAB
ABO/RH: NORMAL
ALBUMIN SERPL-MCNC: 3.6 G/DL (ref 3.4–5)
ALP BLD-CCNC: 107 U/L (ref 40–129)
ALT SERPL-CCNC: 18 U/L (ref 10–40)
ANION GAP SERPL CALCULATED.3IONS-SCNC: 12 MMOL/L (ref 3–16)
ANTIBODY SCREEN: NORMAL
AST SERPL-CCNC: 21 U/L (ref 15–37)
BASOPHILS ABSOLUTE: 0.1 K/UL (ref 0–0.2)
BASOPHILS RELATIVE PERCENT: 0.8 %
BILIRUB SERPL-MCNC: 0.6 MG/DL (ref 0–1)
BILIRUBIN DIRECT: <0.2 MG/DL (ref 0–0.3)
BILIRUBIN URINE: NEGATIVE
BILIRUBIN, INDIRECT: NORMAL MG/DL (ref 0–1)
BLOOD, URINE: NEGATIVE
BUN BLDV-MCNC: 18 MG/DL (ref 7–20)
CALCIUM SERPL-MCNC: 9.2 MG/DL (ref 8.3–10.6)
CHLORIDE BLD-SCNC: 100 MMOL/L (ref 99–110)
CLARITY: CLEAR
CO2: 29 MMOL/L (ref 21–32)
COLOR: YELLOW
CREAT SERPL-MCNC: 0.8 MG/DL (ref 0.6–1.2)
EOSINOPHILS ABSOLUTE: 0.1 K/UL (ref 0–0.6)
EOSINOPHILS RELATIVE PERCENT: 1.9 %
GFR AFRICAN AMERICAN: >60
GFR NON-AFRICAN AMERICAN: >60
GLUCOSE BLD-MCNC: 126 MG/DL (ref 70–99)
GLUCOSE URINE: NEGATIVE MG/DL
HCT VFR BLD CALC: 35.3 % (ref 36–48)
HEMOGLOBIN: 11.8 G/DL (ref 12–16)
INR BLD: 1.11 (ref 0.85–1.15)
KETONES, URINE: NEGATIVE MG/DL
LEUKOCYTE ESTERASE, URINE: NEGATIVE
LYMPHOCYTES ABSOLUTE: 1.1 K/UL (ref 1–5.1)
LYMPHOCYTES RELATIVE PERCENT: 16.8 %
MAGNESIUM: 1.8 MG/DL (ref 1.8–2.4)
MCH RBC QN AUTO: 29.9 PG (ref 26–34)
MCHC RBC AUTO-ENTMCNC: 33.4 G/DL (ref 31–36)
MCV RBC AUTO: 89.6 FL (ref 80–100)
MICROSCOPIC EXAMINATION: NORMAL
MONOCYTES ABSOLUTE: 0.4 K/UL (ref 0–1.3)
MONOCYTES RELATIVE PERCENT: 6.5 %
NEUTROPHILS ABSOLUTE: 4.8 K/UL (ref 1.7–7.7)
NEUTROPHILS RELATIVE PERCENT: 74 %
NITRITE, URINE: NEGATIVE
PDW BLD-RTO: 14.4 % (ref 12.4–15.4)
PH UA: 6.5
PLATELET # BLD: 198 K/UL (ref 135–450)
PMV BLD AUTO: 9.7 FL (ref 5–10.5)
POTASSIUM SERPL-SCNC: 3.5 MMOL/L (ref 3.5–5.1)
PRO-BNP: 1186 PG/ML (ref 0–449)
PROTEIN UA: NEGATIVE MG/DL
PROTHROMBIN TIME: 12.5 SEC (ref 9.6–13)
RBC # BLD: 3.95 M/UL (ref 4–5.2)
SODIUM BLD-SCNC: 141 MMOL/L (ref 136–145)
SPECIFIC GRAVITY UA: 1.01
TOTAL PROTEIN: 7.1 G/DL (ref 6.4–8.2)
TROPONIN: <0.01 NG/ML
URINE REFLEX TO CULTURE: NORMAL
URINE TYPE: NORMAL
UROBILINOGEN, URINE: 1 E.U./DL
WBC # BLD: 6.5 K/UL (ref 4–11)

## 2018-01-04 PROCEDURE — G8420 CALC BMI NORM PARAMETERS: HCPCS | Performed by: INTERNAL MEDICINE

## 2018-01-04 PROCEDURE — G8598 ASA/ANTIPLAT THER USED: HCPCS | Performed by: INTERNAL MEDICINE

## 2018-01-04 PROCEDURE — 99214 OFFICE O/P EST MOD 30 MIN: CPT | Performed by: INTERNAL MEDICINE

## 2018-01-04 PROCEDURE — 4040F PNEUMOC VAC/ADMIN/RCVD: CPT | Performed by: INTERNAL MEDICINE

## 2018-01-04 PROCEDURE — 1123F ACP DISCUSS/DSCN MKR DOCD: CPT | Performed by: INTERNAL MEDICINE

## 2018-01-04 PROCEDURE — G8427 DOCREV CUR MEDS BY ELIG CLIN: HCPCS | Performed by: INTERNAL MEDICINE

## 2018-01-04 PROCEDURE — 1036F TOBACCO NON-USER: CPT | Performed by: INTERNAL MEDICINE

## 2018-01-04 PROCEDURE — G8484 FLU IMMUNIZE NO ADMIN: HCPCS | Performed by: INTERNAL MEDICINE

## 2018-01-04 PROCEDURE — 1090F PRES/ABSN URINE INCON ASSESS: CPT | Performed by: INTERNAL MEDICINE

## 2018-01-04 NOTE — PROGRESS NOTES
Outpatient Visit  Consultation / History and Physical / Followup Visit     Referring Provider:   Drake Ramesh [place]     [phone]    Encounter Type:   []New Patient  [x]Interventional  [x]Followup Patient [x]Structural      Chief Complaint  Visit [] Acute [x] Chronic     Sx [] None [] CP [x] SOB [] Dizzy  Palps [] Fatigue     Problems AS, CAD, HTN, Chol     HPI  TAVR workup complete. STS >3.  Plan for TAVR next week. CV HPI  Symptom Y N Frequency Duration Severity Modifying Assoc Sx Other   CP [] [x]         SOB [x] [] chronic chronic mild +stress -rest     Dizzy [] [x]         Syncope [] [x]         Palpitations [] [x]         Other [] [x]           Compliance(Check if compliant):   [x]Med [x]Diet [x]Salt [x]Tob [x]Alc [x]Drg []Exercise  [x]Counseling given on all above above categories    MedHx:  has a past medical history of Anxiety; Aortic stenosis, severe; CAD (coronary artery disease); CHF (congestive heart failure) ([facility] [address] Hyperlipidemia; and Hypertension. SurgHx:  has a past surgical history that includes Cholecystectomy; back surgery; and Tonsillectomy. SocHx:  reports that she has never smoked. She has never used smokeless tobacco. She reports that she does not drink alcohol or use drugs. FamHx: family history includes Cancer in her sister; Other in her mother.    CVMeds: Reviewed and will remain unchanged except as mentioned in A/P      Current Outpatient Prescriptions   Medication Sig Dispense Refill    lisinopril (PRINIVIL;ZESTRIL) 10 MG tablet TAKE 1 TABLET BY MOUTH DAILY 30 tablet 0    acetaminophen (TYLENOL) 325 MG tablet Take 2 tablets by mouth every 6 hours as needed for Pain or Fever 120 tablet 3    aspirin 81 MG chewable tablet Take 1 tablet by mouth daily 30 tablet 3    atorvastatin (LIPITOR) 80 MG tablet Take 1 tablet by mouth nightly 30 tablet 3    clopidogrel (PLAVIX) 75 MG tablet Take 1 tablet by mouth daily 30 tablet 3    furosemide (LASIX) 20 MG tablet Take 1 tablet

## 2018-01-05 LAB
EKG ATRIAL RATE: 71 BPM
EKG DIAGNOSIS: NORMAL
EKG P AXIS: 78 DEGREES
EKG P-R INTERVAL: 152 MS
EKG Q-T INTERVAL: 408 MS
EKG QRS DURATION: 82 MS
EKG QTC CALCULATION (BAZETT): 443 MS
EKG R AXIS: -2 DEGREES
EKG T AXIS: 121 DEGREES
EKG VENTRICULAR RATE: 71 BPM

## 2018-01-05 PROCEDURE — 93010 ELECTROCARDIOGRAM REPORT: CPT | Performed by: INTERNAL MEDICINE

## 2018-01-10 DIAGNOSIS — D64.9 ANEMIA, UNSPECIFIED TYPE: Primary | ICD-10-CM

## 2018-01-18 ENCOUNTER — HOSPITAL ENCOUNTER (OUTPATIENT)
Dept: OTHER | Age: 83
Discharge: HOME | End: 2018-01-18
Attending: INTERNAL MEDICINE | Admitting: INTERNAL MEDICINE

## 2018-01-18 ENCOUNTER — OFFICE VISIT (OUTPATIENT)
Dept: CARDIOLOGY CLINIC | Age: 83
End: 2018-01-18

## 2018-01-18 VITALS
SYSTOLIC BLOOD PRESSURE: 194 MMHG | DIASTOLIC BLOOD PRESSURE: 94 MMHG | BODY MASS INDEX: 19.71 KG/M2 | HEART RATE: 96 BPM | WEIGHT: 100.9 LBS

## 2018-01-18 DIAGNOSIS — I49.9 IRREGULAR HEART RATE: ICD-10-CM

## 2018-01-18 DIAGNOSIS — I35.0 NONRHEUMATIC AORTIC VALVE STENOSIS: Primary | ICD-10-CM

## 2018-01-18 DIAGNOSIS — Z95.2 S/P TAVR (TRANSCATHETER AORTIC VALVE REPLACEMENT): ICD-10-CM

## 2018-01-18 DIAGNOSIS — I25.10 CORONARY ARTERY DISEASE INVOLVING NATIVE CORONARY ARTERY OF NATIVE HEART WITHOUT ANGINA PECTORIS: ICD-10-CM

## 2018-01-18 DIAGNOSIS — D64.9 ANEMIA, UNSPECIFIED TYPE: ICD-10-CM

## 2018-01-18 DIAGNOSIS — I10 ESSENTIAL HYPERTENSION: ICD-10-CM

## 2018-01-18 DIAGNOSIS — E78.00 HYPERCHOLESTEREMIA: ICD-10-CM

## 2018-01-18 LAB
HCT VFR BLD CALC: 34.1 % (ref 36–48)
HEMOGLOBIN: 11.3 G/DL (ref 12–16)
MCH RBC QN AUTO: 30.7 PG (ref 26–34)
MCHC RBC AUTO-ENTMCNC: 33.2 G/DL (ref 31–36)
MCV RBC AUTO: 92.6 FL (ref 80–100)
PDW BLD-RTO: 15.3 % (ref 12.4–15.4)
PLATELET # BLD: 158 K/UL (ref 135–450)
PMV BLD AUTO: 10 FL (ref 5–10.5)
RBC # BLD: 3.68 M/UL (ref 4–5.2)
WBC # BLD: 7.9 K/UL (ref 4–11)

## 2018-01-18 PROCEDURE — G8598 ASA/ANTIPLAT THER USED: HCPCS | Performed by: INTERNAL MEDICINE

## 2018-01-18 PROCEDURE — 1123F ACP DISCUSS/DSCN MKR DOCD: CPT | Performed by: INTERNAL MEDICINE

## 2018-01-18 PROCEDURE — 93000 ELECTROCARDIOGRAM COMPLETE: CPT | Performed by: INTERNAL MEDICINE

## 2018-01-18 PROCEDURE — 1090F PRES/ABSN URINE INCON ASSESS: CPT | Performed by: INTERNAL MEDICINE

## 2018-01-18 PROCEDURE — 1111F DSCHRG MED/CURRENT MED MERGE: CPT | Performed by: INTERNAL MEDICINE

## 2018-01-18 PROCEDURE — G8427 DOCREV CUR MEDS BY ELIG CLIN: HCPCS | Performed by: INTERNAL MEDICINE

## 2018-01-18 PROCEDURE — G8420 CALC BMI NORM PARAMETERS: HCPCS | Performed by: INTERNAL MEDICINE

## 2018-01-18 PROCEDURE — 4040F PNEUMOC VAC/ADMIN/RCVD: CPT | Performed by: INTERNAL MEDICINE

## 2018-01-18 PROCEDURE — 99214 OFFICE O/P EST MOD 30 MIN: CPT | Performed by: INTERNAL MEDICINE

## 2018-01-18 PROCEDURE — 1036F TOBACCO NON-USER: CPT | Performed by: INTERNAL MEDICINE

## 2018-01-18 PROCEDURE — G8484 FLU IMMUNIZE NO ADMIN: HCPCS | Performed by: INTERNAL MEDICINE

## 2018-01-18 RX ORDER — METOPROLOL SUCCINATE 25 MG/1
25 TABLET, EXTENDED RELEASE ORAL DAILY
Qty: 30 TABLET | Refills: 5 | Status: SHIPPED | OUTPATIENT
Start: 2018-01-18 | End: 2018-07-06 | Stop reason: SDUPTHER

## 2018-01-18 NOTE — PATIENT INSTRUCTIONS
Check your blood pressure daily and write the readings down. Bring them to your next office visit with Dr. Mclean Button. Watch your salt intake. Try to keep your sodium intake to 2000mg a day.

## 2018-01-19 ENCOUNTER — TELEPHONE (OUTPATIENT)
Dept: FAMILY MEDICINE CLINIC | Age: 83
End: 2018-01-19

## 2018-01-23 DIAGNOSIS — E11.21 DIABETIC NEPHROPATHY ASSOCIATED WITH TYPE 2 DIABETES MELLITUS (HCC): ICD-10-CM

## 2018-01-23 DIAGNOSIS — I10 ESSENTIAL HYPERTENSION: ICD-10-CM

## 2018-01-24 RX ORDER — LISINOPRIL 10 MG/1
TABLET ORAL
Qty: 30 TABLET | Refills: 0 | Status: SHIPPED | OUTPATIENT
Start: 2018-01-24 | End: 2018-02-08 | Stop reason: SDUPTHER

## 2018-02-08 ENCOUNTER — OFFICE VISIT (OUTPATIENT)
Dept: CARDIOLOGY CLINIC | Age: 83
End: 2018-02-08

## 2018-02-08 ENCOUNTER — HOSPITAL ENCOUNTER (OUTPATIENT)
Dept: NON INVASIVE DIAGNOSTICS | Age: 83
Discharge: HOME | End: 2018-02-08
Attending: INTERNAL MEDICINE | Admitting: INTERNAL MEDICINE

## 2018-02-08 VITALS
OXYGEN SATURATION: 98 % | WEIGHT: 100 LBS | DIASTOLIC BLOOD PRESSURE: 72 MMHG | BODY MASS INDEX: 19.53 KG/M2 | SYSTOLIC BLOOD PRESSURE: 200 MMHG | HEART RATE: 60 BPM

## 2018-02-08 DIAGNOSIS — Z95.2 S/P TAVR (TRANSCATHETER AORTIC VALVE REPLACEMENT): ICD-10-CM

## 2018-02-08 DIAGNOSIS — E11.21 DIABETIC NEPHROPATHY ASSOCIATED WITH TYPE 2 DIABETES MELLITUS (HCC): ICD-10-CM

## 2018-02-08 DIAGNOSIS — I35.0 NONRHEUMATIC AORTIC VALVE STENOSIS: Primary | ICD-10-CM

## 2018-02-08 DIAGNOSIS — E78.00 HYPERCHOLESTEREMIA: ICD-10-CM

## 2018-02-08 DIAGNOSIS — I35.0 NONRHEUMATIC AORTIC VALVE STENOSIS: ICD-10-CM

## 2018-02-08 DIAGNOSIS — I10 ESSENTIAL HYPERTENSION: ICD-10-CM

## 2018-02-08 DIAGNOSIS — I25.10 CORONARY ARTERY DISEASE INVOLVING NATIVE CORONARY ARTERY OF NATIVE HEART WITHOUT ANGINA PECTORIS: ICD-10-CM

## 2018-02-08 LAB
LV EF: 60 %
LVEF MODALITY: NORMAL

## 2018-02-08 PROCEDURE — G8598 ASA/ANTIPLAT THER USED: HCPCS | Performed by: INTERNAL MEDICINE

## 2018-02-08 PROCEDURE — G8420 CALC BMI NORM PARAMETERS: HCPCS | Performed by: INTERNAL MEDICINE

## 2018-02-08 PROCEDURE — 99214 OFFICE O/P EST MOD 30 MIN: CPT | Performed by: INTERNAL MEDICINE

## 2018-02-08 PROCEDURE — 4040F PNEUMOC VAC/ADMIN/RCVD: CPT | Performed by: INTERNAL MEDICINE

## 2018-02-08 PROCEDURE — 1123F ACP DISCUSS/DSCN MKR DOCD: CPT | Performed by: INTERNAL MEDICINE

## 2018-02-08 PROCEDURE — 1090F PRES/ABSN URINE INCON ASSESS: CPT | Performed by: INTERNAL MEDICINE

## 2018-02-08 PROCEDURE — 1111F DSCHRG MED/CURRENT MED MERGE: CPT | Performed by: INTERNAL MEDICINE

## 2018-02-08 PROCEDURE — G8427 DOCREV CUR MEDS BY ELIG CLIN: HCPCS | Performed by: INTERNAL MEDICINE

## 2018-02-08 PROCEDURE — G8484 FLU IMMUNIZE NO ADMIN: HCPCS | Performed by: INTERNAL MEDICINE

## 2018-02-08 PROCEDURE — 1036F TOBACCO NON-USER: CPT | Performed by: INTERNAL MEDICINE

## 2018-02-08 RX ORDER — AMLODIPINE BESYLATE 5 MG/1
5 TABLET ORAL DAILY
Qty: 30 TABLET | Refills: 3 | Status: SHIPPED | OUTPATIENT
Start: 2018-02-08 | End: 2018-06-14 | Stop reason: SDUPTHER

## 2018-02-08 RX ORDER — ISOSORBIDE MONONITRATE 30 MG/1
30 TABLET, EXTENDED RELEASE ORAL DAILY
Qty: 30 TABLET | Refills: 9 | Status: SHIPPED | OUTPATIENT
Start: 2018-02-08 | End: 2018-07-12 | Stop reason: SDUPTHER

## 2018-02-08 RX ORDER — LISINOPRIL 40 MG/1
40 TABLET ORAL DAILY
Qty: 90 TABLET | Refills: 3 | Status: SHIPPED | OUTPATIENT
Start: 2018-02-08 | End: 2018-07-12 | Stop reason: SDUPTHER

## 2018-02-08 NOTE — PROGRESS NOTES
[address]       H+P // CONSULT // OUTPATIENT VISIT // Pastor Hebert     Referring Doctor Cheyenne Soliman DO   Encounter Type Followup     CHIEF COMPLAINT     VisitType [] Acute [x] Chronic     Symptom [x] None [] CP [] SOB [] Dizzy [] Palps [] Fatigue     Problems AS s/p TAVR 1/18, CAD, HTN, Chol      HISTORY OF PRESENT ILLNESS     30d followup for TAVR. Doing well and feels great. BP running 180s at home. Symptom Y N Frequency Duration Severity Modifying Assoc Sx Other   CP [] [x]         SOB [] [x]         Dizzy [] [x]         Syncope [] [x]         Palpitations [] [x]           COMPLIANCE     Category Meds Diet Salt Exercise Tobacco Alcohol Drugs   Compliant [x] [x] [x] [x] [x] [x] [x]   [x]Counseling given on all above above categories    HISTORY/ALLERGIES/ROS     MedHx:  has a past medical history of Anxiety; Aortic stenosis, severe; CAD (coronary artery disease); CHF (congestive heart failure) ([facility] [address] Hyperlipidemia; and Hypertension. SurgHx:  has a past surgical history that includes Cholecystectomy; back surgery; Tonsillectomy; Coronary angioplasty with stent (12/2017); and Aortic valve surgery (01/2018). SocHx:   reports that she has never smoked. She has never used smokeless tobacco. She reports that she does not drink alcohol or use drugs. FamHx: family history includes Cancer in her sister; Other in her mother. Allergies: Review of patient's allergies indicates no known allergies.    ROS:  [x]Full ROS obtained and negative except as mentioned in HPI     MEDICATIONS      Current Outpatient Prescriptions   Medication Sig Dispense Refill    lisinopril (PRINIVIL;ZESTRIL) 10 MG tablet TAKE 1 TABLET BY MOUTH DAILY 30 tablet 0    metoprolol succinate (TOPROL XL) 25 MG extended release tablet Take 1 tablet by mouth daily 30 tablet 5    isosorbide mononitrate (IMDUR) 30 MG extended release tablet Take 1 tablet by mouth daily 30 tablet 9    atorvastatin (LIPITOR) 80 MG tablet Take 0.5 tablets by mouth nightly 30 tablet 3    acetaminophen (TYLENOL) 325 MG tablet Take 2 tablets by mouth every 6 hours as needed for Pain or Fever 120 tablet 3    aspirin 81 MG chewable tablet Take 1 tablet by mouth daily 30 tablet 3    clopidogrel (PLAVIX) 75 MG tablet Take 1 tablet by mouth daily 30 tablet 3    furosemide (LASIX) 20 MG tablet Take 1 tablet by mouth daily 60 tablet 3     No current facility-administered medications for this visit.       Reviewed with patient and will remain unchanged except as mentioned in A/P  PHYSICAL EXAM     Vitals:    02/08/18 1045   BP: (!) 200/72   Pulse:    SpO2:       Gen Alert, coop, no distress Heart  RRR, no MRG, nl apic impulse   Head NC, AT, no abnorm Abd  Soft, NT, +BS, no mass, no OM   Eyes PERRLA, conj/corn clear Ext  Ext nl, AT, no C/C, tr edema   Nose Nares nl, no drain, NT Pulse 2+ and symmetric   Throat Lips, mucosa, tongue nl Skin Color/text/turg nl, no rash/lesions   Neck S/S, TM, NT, no bruit/JVD Psych Nl mood and affect   Lung CTA-B, unlabored, no DTP Lymph   No cervical or axillary LA   Ch wall NT, no deform Neuro  Nl gross M/S exam     ASSESSMENT AND PLAN     ~AS   Date EF Detail   Sx   SOB with exertion   Hx 1/9/18  TAVR with ES3 20mm   TTE 10/17  1/18  2/18 60%  65%  60% AS MG 43, mild AI  TAVR MG 11, very mild AI  TAVR MG 17, mild perivalvulr AI   EKG 1/18  NSR, LVH, QRS 98 (PostTAVR)   Plan   Echo yearly   ~CAD   Date EF Results   Sx   No concerning   Hx   SVPCI   [facility] 11/17  2 CAITLIN to Cx Myra Smalls)   Plan   Continue aggressive medical treatment at doses above   ~HTN  Today BP [] Controlled [] Borderline [x] Uncontrolled   Counseling [x] Diet/Salt [x] Exercise [x] Weight    Plan BP running high   Increase lisinopril to 40   Give rx for norvasc 5 and start if bp running >150 on lisinopril 40   Refill imdur  ~Hyperchol  Goal LDL [] <100 [x] <70     Counseling [x] Diet [x] Weight [x] Exercise   Lipid/liver Monitor [x] PCP [] Cardio [] Endocrine

## 2018-02-12 ENCOUNTER — TELEPHONE (OUTPATIENT)
Dept: CARDIOLOGY CLINIC | Age: 83
End: 2018-02-12

## 2018-02-14 ENCOUNTER — TELEPHONE (OUTPATIENT)
Dept: CARDIOLOGY CLINIC | Age: 83
End: 2018-02-14

## 2018-02-15 ENCOUNTER — OFFICE VISIT (OUTPATIENT)
Dept: CARDIOLOGY CLINIC | Age: 83
End: 2018-02-15

## 2018-02-15 VITALS
BODY MASS INDEX: 19.89 KG/M2 | DIASTOLIC BLOOD PRESSURE: 70 MMHG | HEART RATE: 68 BPM | SYSTOLIC BLOOD PRESSURE: 138 MMHG | HEIGHT: 60 IN | WEIGHT: 101.3 LBS

## 2018-02-15 DIAGNOSIS — E78.2 MIXED HYPERLIPIDEMIA: ICD-10-CM

## 2018-02-15 DIAGNOSIS — I10 ESSENTIAL HYPERTENSION: ICD-10-CM

## 2018-02-15 DIAGNOSIS — I25.119 CORONARY ARTERY DISEASE INVOLVING NATIVE CORONARY ARTERY OF NATIVE HEART WITH ANGINA PECTORIS (HCC): Primary | Chronic | ICD-10-CM

## 2018-02-15 DIAGNOSIS — Z95.3 S/P TAVR (TRANSCATHETER AORTIC VALVE REPLACEMENT), BIOPROSTHETIC: ICD-10-CM

## 2018-02-15 PROCEDURE — 1036F TOBACCO NON-USER: CPT | Performed by: NURSE PRACTITIONER

## 2018-02-15 PROCEDURE — 1090F PRES/ABSN URINE INCON ASSESS: CPT | Performed by: NURSE PRACTITIONER

## 2018-02-15 PROCEDURE — G8598 ASA/ANTIPLAT THER USED: HCPCS | Performed by: NURSE PRACTITIONER

## 2018-02-15 PROCEDURE — 1123F ACP DISCUSS/DSCN MKR DOCD: CPT | Performed by: NURSE PRACTITIONER

## 2018-02-15 PROCEDURE — G8420 CALC BMI NORM PARAMETERS: HCPCS | Performed by: NURSE PRACTITIONER

## 2018-02-15 PROCEDURE — G8484 FLU IMMUNIZE NO ADMIN: HCPCS | Performed by: NURSE PRACTITIONER

## 2018-02-15 PROCEDURE — 4040F PNEUMOC VAC/ADMIN/RCVD: CPT | Performed by: NURSE PRACTITIONER

## 2018-02-15 PROCEDURE — 99214 OFFICE O/P EST MOD 30 MIN: CPT | Performed by: NURSE PRACTITIONER

## 2018-02-15 PROCEDURE — G8427 DOCREV CUR MEDS BY ELIG CLIN: HCPCS | Performed by: NURSE PRACTITIONER

## 2018-02-15 NOTE — PROGRESS NOTES
138/70      Stable      Continue current medications: instructed the patient to take norvasc 5 and start if bp running >150     ~Hyperlipidemia    On Lipitor 80 mg daily   11/28/17: HDL: 52, LDL: 70    Patient  is stable since last office visit. Plan:   Continue current medications : instructed the patient to take norvasc 5 and start if bp running >150   Follow up in two weeks. I have addressed the patient's cardiac risk factors and adjusted pharmacologic treatment as needed. In addition, I have reinforced the need for patient directed risk factor modification. Further evaluation will be based upon the patient's clinical course and testing results. All questions and concerns were addressed to the patient/family. Alternatives to  treatment were discussed. The patient  currently  is not smoking. The risks related to smoking were reviewed with the patient. Recommend maintaining a smoke-free lifestyle. Products available for smoking cessation were discussed. Daily weight, low sodium diet were discussed. Patient instructed to call the office with a weight gain: > 3 lbs over night or 5 lbs in one week; swelling, SOB/orthopnea/PND    Dual Antiplatelet therapy has been prescribed for this patient. Education conducted on adverse reactions including bleeding was discussed. The patient verbalizes understanding. Pt is on a BB  Pt is on an ace-i/ARB  Pt is on a statin    Saturated fat diet discussed  Exercise program discussed    Thank you for allowing to us to participate in the care of Rudy Fernandez CNP

## 2018-03-07 NOTE — PROGRESS NOTES
[address]       H+P // CONSULT // OUTPATIENT VISIT // Kelly Cole     Referring Doctor Jennifer Moura DO   Encounter Type Followup     CHIEF COMPLAINT     VisitType [] Acute [x] Chronic     Symptom [x] None [] CP [] SOB [] Dizzy [] Palps [] Fatigue     Problems AS s/p TAVR 1/18, CAD, HTN, Chol      HISTORY OF PRESENT ILLNESS     Doing great. Denies cp, sob. BP much better controlled. Son states he would like to followup at [place] indefinitely as traffic too congested and difficult to navigate at [facility].       Symptom Y N Frequency Duration Severity Modifying Assoc Sx Other   CP [] [x]         SOB [] [x]         Dizzy [] [x]         Syncope [] [x]         Palpitations [] [x]           COMPLIANCE     Category Meds Diet Salt Exercise Tobacco Alcohol Drugs   Compliant [x] [x] [x] [x] [x] [x] [x]   [x]Counseling given on all above above categories    HISTORY/ALLERGIES/ROS     MedHx:  has a past medical history of Anxiety; Aortic stenosis, severe; CAD (coronary artery disease); CHF (congestive heart failure) ([facility] [address] Hyperlipidemia; and Hypertension. SurgHx:  has a past surgical history that includes Cholecystectomy; back surgery; Tonsillectomy; Coronary angioplasty with stent (12/2017); and Aortic valve surgery (01/2018). SocHx:   reports that she has never smoked. She has never used smokeless tobacco. She reports that she does not drink alcohol or use drugs. FamHx: family history includes Cancer in her sister; Other in her mother. Allergies: Patient has no known allergies.    ROS:  [x]Full ROS obtained and negative except as mentioned in HPI     MEDICATIONS      Current Outpatient Prescriptions   Medication Sig Dispense Refill    lisinopril (PRINIVIL;ZESTRIL) 40 MG tablet Take 1 tablet by mouth daily 90 tablet 3    amLODIPine (NORVASC) 5 MG tablet Take 1 tablet by mouth daily 30 tablet 3    isosorbide mononitrate (IMDUR) 30 MG extended release tablet Take 1 tablet by mouth daily 30 Weight [x] Exercise   Lipid/liver Monitor [x] PCP [] Cardio [] Endocrine   Plan Continue current doses of meds listed above   ~Followup  []2 wk   []1m   []3m    []6m   []12m    []PRN  [x]Other: 4 months

## 2018-03-08 ENCOUNTER — OFFICE VISIT (OUTPATIENT)
Dept: CARDIOLOGY CLINIC | Age: 83
End: 2018-03-08

## 2018-03-08 VITALS
HEIGHT: 60 IN | SYSTOLIC BLOOD PRESSURE: 140 MMHG | BODY MASS INDEX: 19.44 KG/M2 | HEART RATE: 56 BPM | WEIGHT: 99 LBS | OXYGEN SATURATION: 98 % | DIASTOLIC BLOOD PRESSURE: 70 MMHG

## 2018-03-08 DIAGNOSIS — Z95.3 S/P TAVR (TRANSCATHETER AORTIC VALVE REPLACEMENT), BIOPROSTHETIC: ICD-10-CM

## 2018-03-08 DIAGNOSIS — I35.0 NONRHEUMATIC AORTIC VALVE STENOSIS: Primary | ICD-10-CM

## 2018-03-08 DIAGNOSIS — I25.119 CORONARY ARTERY DISEASE INVOLVING NATIVE CORONARY ARTERY OF NATIVE HEART WITH ANGINA PECTORIS (HCC): ICD-10-CM

## 2018-03-08 DIAGNOSIS — I10 ESSENTIAL HYPERTENSION: ICD-10-CM

## 2018-03-08 DIAGNOSIS — E78.00 HYPERCHOLESTEREMIA: ICD-10-CM

## 2018-03-08 PROCEDURE — G8427 DOCREV CUR MEDS BY ELIG CLIN: HCPCS | Performed by: INTERNAL MEDICINE

## 2018-03-08 PROCEDURE — 4040F PNEUMOC VAC/ADMIN/RCVD: CPT | Performed by: INTERNAL MEDICINE

## 2018-03-08 PROCEDURE — 1090F PRES/ABSN URINE INCON ASSESS: CPT | Performed by: INTERNAL MEDICINE

## 2018-03-08 PROCEDURE — G8598 ASA/ANTIPLAT THER USED: HCPCS | Performed by: INTERNAL MEDICINE

## 2018-03-08 PROCEDURE — G8484 FLU IMMUNIZE NO ADMIN: HCPCS | Performed by: INTERNAL MEDICINE

## 2018-03-08 PROCEDURE — 1036F TOBACCO NON-USER: CPT | Performed by: INTERNAL MEDICINE

## 2018-03-08 PROCEDURE — 99214 OFFICE O/P EST MOD 30 MIN: CPT | Performed by: INTERNAL MEDICINE

## 2018-03-08 PROCEDURE — 1123F ACP DISCUSS/DSCN MKR DOCD: CPT | Performed by: INTERNAL MEDICINE

## 2018-03-08 PROCEDURE — G8420 CALC BMI NORM PARAMETERS: HCPCS | Performed by: INTERNAL MEDICINE

## 2018-04-02 RX ORDER — ASPIRIN 81 MG/1
TABLET, CHEWABLE ORAL
Qty: 30 TABLET | Refills: 5 | Status: SHIPPED | OUTPATIENT
Start: 2018-04-02 | End: 2018-10-09 | Stop reason: SDUPTHER

## 2018-05-17 RX ORDER — ATORVASTATIN CALCIUM 80 MG/1
TABLET, FILM COATED ORAL
Qty: 30 TABLET | Refills: 5 | Status: SHIPPED | OUTPATIENT
Start: 2018-05-17 | End: 2018-07-12 | Stop reason: SDUPTHER

## 2018-06-02 DIAGNOSIS — I42.0 CONGESTIVE CARDIOMYOPATHY (HCC): ICD-10-CM

## 2018-06-02 RX ORDER — FUROSEMIDE 20 MG/1
20 TABLET ORAL DAILY
Qty: 60 TABLET | Refills: 0 | Status: SHIPPED | OUTPATIENT
Start: 2018-06-02 | End: 2018-07-12 | Stop reason: SDUPTHER

## 2018-06-15 RX ORDER — AMLODIPINE BESYLATE 5 MG/1
TABLET ORAL
Qty: 90 TABLET | Refills: 2 | Status: SHIPPED | OUTPATIENT
Start: 2018-06-15 | End: 2018-07-12 | Stop reason: SDUPTHER

## 2018-07-06 RX ORDER — METOPROLOL SUCCINATE 25 MG/1
25 TABLET, EXTENDED RELEASE ORAL DAILY
Qty: 30 TABLET | Refills: 0 | Status: SHIPPED | OUTPATIENT
Start: 2018-07-06 | End: 2018-07-12 | Stop reason: SDUPTHER

## 2018-07-09 NOTE — PROGRESS NOTES
[address]       H+P // CONSULT // OUTPATIENT VISIT // Kami Phillip     Referring Doctor Mary Andrade, DO   Encounter Type Followup     CHIEF COMPLAINT     VisitType [] Acute [x] Chronic     Symptom [x] None [] CP [] SOB [] Dizzy [] Palps [] Fatigue     Problems AS s/p TAVR 1/18, CAD, HTN, Chol      HISTORY OF PRESENT ILLNESS     Doing great. Denies cp, sob. Last echo unremarkable. Compliant with meds. With son today and he states she is doing great. SBP 140s at home. Symptom Y N Frequency Duration Severity Modifying Assoc Sx Other   CP [] [x]         SOB [] [x]         Dizzy [] [x]         Syncope [] [x]         Palpitations [] [x]           COMPLIANCE     Category Meds Diet Salt Exercise Tobacco Alcohol Drugs   Compliant [x] [x] [x] [x] [x] [x] [x]   [x]Counseling given on all above above categories    HISTORY/ALLERGIES/ROS     MedHx:  has a past medical history of Anxiety; Aortic stenosis, severe; CAD (coronary artery disease); CHF (congestive heart failure) ([facility] [address] Hyperlipidemia; and Hypertension. SurgHx:  has a past surgical history that includes Cholecystectomy; back surgery; Tonsillectomy; Coronary angioplasty with stent (12/2017); and Aortic valve surgery (01/2018). SocHx:   reports that she has never smoked. She has never used smokeless tobacco. She reports that she does not drink alcohol or use drugs. FamHx: family history includes Cancer in her sister; Other in her mother. Allergies: Patient has no known allergies.    ROS:  [x]Full ROS obtained and negative except as mentioned in HPI     MEDICATIONS      Current Outpatient Prescriptions   Medication Sig Dispense Refill    metoprolol succinate (TOPROL XL) 25 MG extended release tablet TAKE 1 TABLET BY MOUTH DAILY 30 tablet 0    amLODIPine (NORVASC) 5 MG tablet TAKE 1 TABLET BY MOUTH EVERY DAY 90 tablet 2    furosemide (LASIX) 20 MG tablet TAKE 1 TABLET BY MOUTH DAILY 60 tablet 0    atorvastatin (LIPITOR) 80 MG tablet TAKE 1 TABLET BY MOUTH EVERY NIGHT AT BEDTIME 30 tablet 5    aspirin 81 MG chewable tablet CHEW AND SWALLOW ONE TABLET DAILY 30 tablet 5    lisinopril (PRINIVIL;ZESTRIL) 40 MG tablet Take 1 tablet by mouth daily 90 tablet 3    isosorbide mononitrate (IMDUR) 30 MG extended release tablet Take 1 tablet by mouth daily 30 tablet 9    atorvastatin (LIPITOR) 80 MG tablet Take 0.5 tablets by mouth nightly 30 tablet 3    acetaminophen (TYLENOL) 325 MG tablet Take 2 tablets by mouth every 6 hours as needed for Pain or Fever 120 tablet 3    clopidogrel (PLAVIX) 75 MG tablet Take 1 tablet by mouth daily 30 tablet 3     No current facility-administered medications for this visit.       Reviewed with patient and will remain unchanged except as mentioned in A/P  PHYSICAL EXAM     Vitals:    07/12/18 0814   BP: (!) 150/62   Pulse:       Gen Alert, coop, no distress Heart  RRR, no MRG, nl apical impulse   Head NC, AT, no abnorm Abd  Soft, NT, +BS, no mass, no OM   Eyes PER, conj/corn clear Ext  Ext nl, AT, no C/C/E   Nose Nares nl, no drain, NT Pulse 2+ and symmetric   Throat Lips, mucosa, tongue nl Skin Col/text/turg nl, no vis rash/les   Neck S/S, TM, NT, no bruit/JVD Psych Nl mood and affect   Lung CTA-B, unlabored, no DTP Lymph   No cervical or axillary LA   Ch wall NT, no deform Neuro  Nl gross M/S exam     ASSESSMENT AND PLAN     ~AS   Date EF Detail   Sx   SOB with exertion   Hx 1/9/18  TAVR with ES3 20mm   TTE 10/17  1/18  2/18 60%  65%  60% AS MG 43, mild AI  TAVR MG 11, very mild AI  TAVR MG 17, mild perivalvulr AI   EKG 1/18  NSR, LVH, QRS 98 (PostTAVR)   Plan   Echo yearly or with symptom change   ~CAD   Date EF Results   Sx   No concerning   Hx   SVPCI   [facility] 11/17  2 CAITLIN to Cx Felix Flor)   Plan   Continue aggressive medical treatment at doses above   ~HTN  Today BP [] Controlled [x] Borderline [] Uncontrolled   Counseling [x] Diet/Salt [x] Exercise [x] Weight    Plan Continue current meds  ~Hyperchol  Goal LDL [] <100 [x] <70     Counseling [x] Diet [x] Weight [x] Exercise   Lipid/liver Monitor [x] PCP [] Cardio [] Endocrine   Plan Continue current doses of meds listed above   11/17 HDL: 52, LDL 70  ~Followup  []2 wk   []1m   []3m    [x]6m   []12m    []PRN  []Other:     [address] Irasema Ignacio, am scribing for and in the presence of Victoria Wilson MD.   SignedIrasema 07/09/18 11:57 AM   Provider Taye Garcia is working as a scribe for and in the presence of me (Victoria Wilson MD). Working as a scribe, Irasema Roth may have prepopulated components of this note with my historical  intellectual property under my direct supervision. Any additions to this intellectual property were performed in my presence and at my direction. Furthermore, the content and accuracy of this note have been reviewed by me Victoria Wilson MD).

## 2018-07-12 ENCOUNTER — OFFICE VISIT (OUTPATIENT)
Dept: CARDIOLOGY CLINIC | Age: 83
End: 2018-07-12

## 2018-07-12 VITALS
SYSTOLIC BLOOD PRESSURE: 150 MMHG | BODY MASS INDEX: 19.83 KG/M2 | HEART RATE: 56 BPM | HEIGHT: 60 IN | WEIGHT: 101 LBS | DIASTOLIC BLOOD PRESSURE: 62 MMHG

## 2018-07-12 DIAGNOSIS — I42.0 CONGESTIVE CARDIOMYOPATHY (HCC): ICD-10-CM

## 2018-07-12 DIAGNOSIS — I25.10 CORONARY ARTERY DISEASE INVOLVING NATIVE CORONARY ARTERY OF NATIVE HEART WITHOUT ANGINA PECTORIS: ICD-10-CM

## 2018-07-12 DIAGNOSIS — I10 ESSENTIAL HYPERTENSION: ICD-10-CM

## 2018-07-12 DIAGNOSIS — I35.0 NONRHEUMATIC AORTIC VALVE STENOSIS: Primary | ICD-10-CM

## 2018-07-12 DIAGNOSIS — E11.21 DIABETIC NEPHROPATHY ASSOCIATED WITH TYPE 2 DIABETES MELLITUS (HCC): ICD-10-CM

## 2018-07-12 DIAGNOSIS — E78.00 HYPERCHOLESTEREMIA: ICD-10-CM

## 2018-07-12 PROCEDURE — 1090F PRES/ABSN URINE INCON ASSESS: CPT | Performed by: INTERNAL MEDICINE

## 2018-07-12 PROCEDURE — G8598 ASA/ANTIPLAT THER USED: HCPCS | Performed by: INTERNAL MEDICINE

## 2018-07-12 PROCEDURE — 4040F PNEUMOC VAC/ADMIN/RCVD: CPT | Performed by: INTERNAL MEDICINE

## 2018-07-12 PROCEDURE — 1123F ACP DISCUSS/DSCN MKR DOCD: CPT | Performed by: INTERNAL MEDICINE

## 2018-07-12 PROCEDURE — G8427 DOCREV CUR MEDS BY ELIG CLIN: HCPCS | Performed by: INTERNAL MEDICINE

## 2018-07-12 PROCEDURE — 1101F PT FALLS ASSESS-DOCD LE1/YR: CPT | Performed by: INTERNAL MEDICINE

## 2018-07-12 PROCEDURE — 99214 OFFICE O/P EST MOD 30 MIN: CPT | Performed by: INTERNAL MEDICINE

## 2018-07-12 PROCEDURE — 1036F TOBACCO NON-USER: CPT | Performed by: INTERNAL MEDICINE

## 2018-07-12 PROCEDURE — G8420 CALC BMI NORM PARAMETERS: HCPCS | Performed by: INTERNAL MEDICINE

## 2018-07-12 RX ORDER — ISOSORBIDE MONONITRATE 30 MG/1
30 TABLET, EXTENDED RELEASE ORAL DAILY
Qty: 90 TABLET | Refills: 3 | Status: SHIPPED | OUTPATIENT
Start: 2018-07-12 | End: 2019-07-28 | Stop reason: SDUPTHER

## 2018-07-12 RX ORDER — FUROSEMIDE 20 MG/1
20 TABLET ORAL DAILY
Qty: 90 TABLET | Refills: 3 | Status: SHIPPED | OUTPATIENT
Start: 2018-07-12 | End: 2019-07-28 | Stop reason: SDUPTHER

## 2018-07-12 RX ORDER — ATORVASTATIN CALCIUM 80 MG/1
TABLET, FILM COATED ORAL
Qty: 90 TABLET | Refills: 3 | Status: SHIPPED | OUTPATIENT
Start: 2018-07-12 | End: 2019-06-06 | Stop reason: SINTOL

## 2018-07-12 RX ORDER — ATORVASTATIN CALCIUM 80 MG/1
40 TABLET, FILM COATED ORAL NIGHTLY
Qty: 90 TABLET | Refills: 3 | Status: SHIPPED | OUTPATIENT
Start: 2018-07-12 | End: 2019-06-06 | Stop reason: SINTOL

## 2018-07-12 RX ORDER — CLOPIDOGREL BISULFATE 75 MG/1
75 TABLET ORAL DAILY
Qty: 90 TABLET | Refills: 3 | Status: SHIPPED | OUTPATIENT
Start: 2018-07-12 | End: 2019-06-06 | Stop reason: ALTCHOICE

## 2018-07-12 RX ORDER — METOPROLOL SUCCINATE 25 MG/1
25 TABLET, EXTENDED RELEASE ORAL DAILY
Qty: 90 TABLET | Refills: 3 | Status: SHIPPED | OUTPATIENT
Start: 2018-07-12 | End: 2019-06-06 | Stop reason: SINTOL

## 2018-07-12 RX ORDER — LISINOPRIL 40 MG/1
40 TABLET ORAL DAILY
Qty: 90 TABLET | Refills: 3 | Status: ON HOLD | OUTPATIENT
Start: 2018-07-12 | End: 2020-01-01 | Stop reason: HOSPADM

## 2018-07-12 RX ORDER — AMLODIPINE BESYLATE 5 MG/1
TABLET ORAL
Qty: 90 TABLET | Refills: 2 | Status: SHIPPED | OUTPATIENT
Start: 2018-07-12 | End: 2019-01-01 | Stop reason: SDUPTHER

## 2018-10-09 RX ORDER — ASPIRIN 81 MG/1
TABLET, CHEWABLE ORAL
Qty: 30 TABLET | Refills: 5 | Status: SHIPPED | OUTPATIENT
Start: 2018-10-09 | End: 2019-01-01 | Stop reason: SDUPTHER

## 2018-12-03 PROBLEM — E78.00 HYPERCHOLESTEREMIA: Status: ACTIVE | Noted: 2018-12-03

## 2018-12-03 NOTE — PROGRESS NOTES
[address]       H+P // CONSULT // OUTPATIENT VISIT // May Mcmahon     Referring Doctor [facility], DO   Encounter Type Followup     CHIEF COMPLAINT     VisitType [] Acute [x] Chronic     Symptom [x] None [] CP [] SOB [] Dizzy [] Palps [] Fatigue     Problems AS s/p TAVR 1/18, CAD, HTN, Chol      HISTORY OF PRESENT ILLNESS     Doing great. Denies cp, sob. Echo today looks good. Reports leg weakness since starting lipitor 1 year ago. Symptom Y N Frequency Duration Severity Modifying Assoc Sx Other   CP [] [x]         SOB [] [x]         Dizzy [] [x]         Syncope [] [x]         Palpitations [] [x]           COMPLIANCE     Category Meds Diet Salt Exercise Tobacco Alcohol Drugs   Compliant [x] [x] [x] [] [x] [x] [x]   [x]Counseling given on all above above categories    HISTORY/ALLERGIES/ROS     MedHx:  has a past medical history of Anxiety; Aortic stenosis, severe; CAD (coronary artery disease); CHF (congestive heart failure) ([facility] [address] Hyperlipidemia; and Hypertension. SurgHx:  has a past surgical history that includes Cholecystectomy; back surgery; Tonsillectomy; Coronary angioplasty with stent (12/2017); and Aortic valve surgery (01/2018). SocHx:   reports that she has never smoked. She has never used smokeless tobacco. She reports that she does not drink alcohol or use drugs. FamHx: family history includes Cancer in her sister; Other in her mother. Allergies: Patient has no known allergies.    ROS:  [x]Full ROS obtained and negative except as mentioned in HPI     MEDICATIONS      Current Outpatient Prescriptions   Medication Sig Dispense Refill    aspirin 81 MG chewable tablet CHEW AND SWALLOW ONE TABLET DAILY 30 tablet 5    metoprolol succinate (TOPROL XL) 25 MG extended release tablet Take 1 tablet by mouth daily 90 tablet 3    amLODIPine (NORVASC) 5 MG tablet TAKE 1 TABLET BY MOUTH EVERY DAY 90 tablet 2    furosemide (LASIX) 20 MG tablet Take 1 tablet by mouth daily 90 tablet

## 2018-12-06 ENCOUNTER — OFFICE VISIT (OUTPATIENT)
Dept: CARDIOLOGY CLINIC | Age: 83
End: 2018-12-06
Payer: MEDICARE

## 2018-12-06 ENCOUNTER — HOSPITAL ENCOUNTER (OUTPATIENT)
Dept: NON INVASIVE DIAGNOSTICS | Age: 83
Discharge: HOME | End: 2018-12-06
Payer: MEDICARE

## 2018-12-06 VITALS
HEART RATE: 60 BPM | SYSTOLIC BLOOD PRESSURE: 130 MMHG | HEIGHT: 60 IN | DIASTOLIC BLOOD PRESSURE: 60 MMHG | BODY MASS INDEX: 19.63 KG/M2 | WEIGHT: 100 LBS

## 2018-12-06 DIAGNOSIS — E78.00 HYPERCHOLESTEREMIA: ICD-10-CM

## 2018-12-06 DIAGNOSIS — I35.0 NONRHEUMATIC AORTIC VALVE STENOSIS: Primary | ICD-10-CM

## 2018-12-06 DIAGNOSIS — I10 ESSENTIAL HYPERTENSION: ICD-10-CM

## 2018-12-06 DIAGNOSIS — Z95.2 S/P TAVR (TRANSCATHETER AORTIC VALVE REPLACEMENT): ICD-10-CM

## 2018-12-06 DIAGNOSIS — I25.10 CORONARY ARTERY DISEASE INVOLVING NATIVE CORONARY ARTERY OF NATIVE HEART WITHOUT ANGINA PECTORIS: ICD-10-CM

## 2018-12-06 DIAGNOSIS — I35.0 NONRHEUMATIC AORTIC VALVE STENOSIS: ICD-10-CM

## 2018-12-06 LAB
LV EF: 58 %
LVEF MODALITY: NORMAL

## 2018-12-06 PROCEDURE — 93306 TTE W/DOPPLER COMPLETE: CPT

## 2018-12-06 PROCEDURE — 4040F PNEUMOC VAC/ADMIN/RCVD: CPT | Performed by: INTERNAL MEDICINE

## 2018-12-06 PROCEDURE — 99214 OFFICE O/P EST MOD 30 MIN: CPT | Performed by: INTERNAL MEDICINE

## 2018-12-06 PROCEDURE — G8598 ASA/ANTIPLAT THER USED: HCPCS | Performed by: INTERNAL MEDICINE

## 2018-12-06 PROCEDURE — 1101F PT FALLS ASSESS-DOCD LE1/YR: CPT | Performed by: INTERNAL MEDICINE

## 2018-12-06 PROCEDURE — G8420 CALC BMI NORM PARAMETERS: HCPCS | Performed by: INTERNAL MEDICINE

## 2018-12-06 PROCEDURE — G8484 FLU IMMUNIZE NO ADMIN: HCPCS | Performed by: INTERNAL MEDICINE

## 2018-12-06 PROCEDURE — 1090F PRES/ABSN URINE INCON ASSESS: CPT | Performed by: INTERNAL MEDICINE

## 2018-12-06 PROCEDURE — 1036F TOBACCO NON-USER: CPT | Performed by: INTERNAL MEDICINE

## 2018-12-06 PROCEDURE — 1123F ACP DISCUSS/DSCN MKR DOCD: CPT | Performed by: INTERNAL MEDICINE

## 2018-12-06 PROCEDURE — G8427 DOCREV CUR MEDS BY ELIG CLIN: HCPCS | Performed by: INTERNAL MEDICINE

## 2019-01-01 ENCOUNTER — OFFICE VISIT (OUTPATIENT)
Dept: CARDIOLOGY CLINIC | Age: 84
End: 2019-01-01
Payer: MEDICARE

## 2019-01-01 ENCOUNTER — HOSPITAL ENCOUNTER (OUTPATIENT)
Dept: NON INVASIVE DIAGNOSTICS | Age: 84
Discharge: HOME | End: 2019-11-22
Payer: MEDICARE

## 2019-01-01 ENCOUNTER — HOSPITAL ENCOUNTER (OUTPATIENT)
Age: 84
Discharge: HOME | End: 2019-11-25
Payer: MEDICARE

## 2019-01-01 ENCOUNTER — TELEPHONE (OUTPATIENT)
Dept: CARDIOLOGY CLINIC | Age: 84
End: 2019-01-01

## 2019-01-01 ENCOUNTER — HOSPITAL ENCOUNTER (OUTPATIENT)
Dept: VASCULAR LAB | Age: 84
Discharge: HOME | End: 2019-11-19
Payer: MEDICARE

## 2019-01-01 VITALS
DIASTOLIC BLOOD PRESSURE: 64 MMHG | SYSTOLIC BLOOD PRESSURE: 130 MMHG | WEIGHT: 100.9 LBS | BODY MASS INDEX: 21.77 KG/M2 | HEIGHT: 57 IN | HEART RATE: 80 BPM

## 2019-01-01 VITALS
WEIGHT: 100 LBS | SYSTOLIC BLOOD PRESSURE: 148 MMHG | BODY MASS INDEX: 21.64 KG/M2 | DIASTOLIC BLOOD PRESSURE: 78 MMHG | OXYGEN SATURATION: 98 % | HEART RATE: 74 BPM

## 2019-01-01 DIAGNOSIS — M79.604 RIGHT LEG PAIN: Primary | ICD-10-CM

## 2019-01-01 DIAGNOSIS — E78.00 HYPERCHOLESTEREMIA: ICD-10-CM

## 2019-01-01 DIAGNOSIS — Z95.2 S/P TAVR (TRANSCATHETER AORTIC VALVE REPLACEMENT): ICD-10-CM

## 2019-01-01 DIAGNOSIS — I42.0 CONGESTIVE CARDIOMYOPATHY (HCC): ICD-10-CM

## 2019-01-01 DIAGNOSIS — I25.10 CORONARY ARTERY DISEASE INVOLVING NATIVE CORONARY ARTERY OF NATIVE HEART WITHOUT ANGINA PECTORIS: ICD-10-CM

## 2019-01-01 DIAGNOSIS — I35.0 NONRHEUMATIC AORTIC VALVE STENOSIS: Primary | ICD-10-CM

## 2019-01-01 DIAGNOSIS — M79.604 RIGHT LEG PAIN: ICD-10-CM

## 2019-01-01 DIAGNOSIS — I10 ESSENTIAL HYPERTENSION: ICD-10-CM

## 2019-01-01 DIAGNOSIS — M79.89 LEG SWELLING: ICD-10-CM

## 2019-01-01 LAB
ANION GAP SERPL CALCULATED.3IONS-SCNC: 15 MMOL/L (ref 3–16)
BUN BLDV-MCNC: 19 MG/DL (ref 7–20)
CALCIUM SERPL-MCNC: 9.5 MG/DL (ref 8.3–10.6)
CHLORIDE BLD-SCNC: 100 MMOL/L (ref 99–110)
CO2: 27 MMOL/L (ref 21–32)
CREAT SERPL-MCNC: 1.1 MG/DL (ref 0.6–1.2)
GFR AFRICAN AMERICAN: 57
GFR NON-AFRICAN AMERICAN: 47
GLUCOSE BLD-MCNC: 126 MG/DL (ref 70–99)
LEFT VENTRICULAR EJECTION FRACTION HIGH VALUE: 60 %
LEFT VENTRICULAR EJECTION FRACTION MODE: NORMAL
LV EF: 55 %
LV EF: 58 %
LVEF MODALITY: NORMAL
POTASSIUM SERPL-SCNC: 4.4 MMOL/L (ref 3.5–5.1)
PRO-BNP: 277 PG/ML (ref 0–449)
SODIUM BLD-SCNC: 142 MMOL/L (ref 136–145)

## 2019-01-01 PROCEDURE — G8427 DOCREV CUR MEDS BY ELIG CLIN: HCPCS | Performed by: INTERNAL MEDICINE

## 2019-01-01 PROCEDURE — 1036F TOBACCO NON-USER: CPT | Performed by: INTERNAL MEDICINE

## 2019-01-01 PROCEDURE — 36415 COLL VENOUS BLD VENIPUNCTURE: CPT

## 2019-01-01 PROCEDURE — G8420 CALC BMI NORM PARAMETERS: HCPCS | Performed by: INTERNAL MEDICINE

## 2019-01-01 PROCEDURE — 1123F ACP DISCUSS/DSCN MKR DOCD: CPT | Performed by: INTERNAL MEDICINE

## 2019-01-01 PROCEDURE — 99214 OFFICE O/P EST MOD 30 MIN: CPT | Performed by: INTERNAL MEDICINE

## 2019-01-01 PROCEDURE — 93971 EXTREMITY STUDY: CPT

## 2019-01-01 PROCEDURE — G8598 ASA/ANTIPLAT THER USED: HCPCS | Performed by: INTERNAL MEDICINE

## 2019-01-01 PROCEDURE — 1090F PRES/ABSN URINE INCON ASSESS: CPT | Performed by: INTERNAL MEDICINE

## 2019-01-01 PROCEDURE — G8484 FLU IMMUNIZE NO ADMIN: HCPCS | Performed by: INTERNAL MEDICINE

## 2019-01-01 PROCEDURE — 83880 ASSAY OF NATRIURETIC PEPTIDE: CPT

## 2019-01-01 PROCEDURE — 4040F PNEUMOC VAC/ADMIN/RCVD: CPT | Performed by: INTERNAL MEDICINE

## 2019-01-01 PROCEDURE — 93306 TTE W/DOPPLER COMPLETE: CPT

## 2019-01-01 PROCEDURE — 80048 BASIC METABOLIC PNL TOTAL CA: CPT

## 2019-01-01 RX ORDER — FUROSEMIDE 20 MG/1
20 TABLET ORAL DAILY
Qty: 90 TABLET | Refills: 0 | Status: SHIPPED | OUTPATIENT
Start: 2019-01-01 | End: 2019-01-01

## 2019-01-01 RX ORDER — ASPIRIN 81 MG/1
TABLET, CHEWABLE ORAL
Qty: 30 TABLET | Refills: 0 | Status: SHIPPED | OUTPATIENT
Start: 2019-01-01 | End: 2019-01-01 | Stop reason: SDUPTHER

## 2019-01-01 RX ORDER — FUROSEMIDE 40 MG/1
40 TABLET ORAL DAILY
Qty: 90 TABLET | Refills: 3 | Status: ON HOLD | OUTPATIENT
Start: 2019-01-01 | End: 2020-01-01 | Stop reason: HOSPADM

## 2019-01-01 RX ORDER — ASPIRIN 81 MG/1
TABLET, CHEWABLE ORAL
Qty: 30 TABLET | Refills: 0 | Status: SHIPPED | OUTPATIENT
Start: 2019-01-01 | End: 2020-01-01

## 2019-01-01 RX ORDER — ISOSORBIDE MONONITRATE 30 MG/1
30 TABLET, EXTENDED RELEASE ORAL DAILY
Qty: 90 TABLET | Refills: 0 | Status: SHIPPED | OUTPATIENT
Start: 2019-01-01 | End: 2020-01-01

## 2019-01-01 RX ORDER — POTASSIUM CHLORIDE 20 MEQ/1
20 TABLET, EXTENDED RELEASE ORAL DAILY
Qty: 90 TABLET | Refills: 3 | Status: ON HOLD | OUTPATIENT
Start: 2019-01-01 | End: 2020-01-01 | Stop reason: SDUPTHER

## 2019-01-30 DIAGNOSIS — E11.21 DIABETIC NEPHROPATHY ASSOCIATED WITH TYPE 2 DIABETES MELLITUS (HCC): ICD-10-CM

## 2019-01-30 DIAGNOSIS — I10 ESSENTIAL HYPERTENSION: ICD-10-CM

## 2019-01-30 RX ORDER — LISINOPRIL 40 MG/1
40 TABLET ORAL DAILY
Qty: 90 TABLET | Refills: 3 | Status: SHIPPED | OUTPATIENT
Start: 2019-01-30 | End: 2020-01-01

## 2019-03-13 RX ORDER — AMLODIPINE BESYLATE 5 MG/1
TABLET ORAL
Qty: 90 TABLET | Refills: 3 | Status: SHIPPED | OUTPATIENT
Start: 2019-03-13 | End: 2019-01-01 | Stop reason: SINTOL

## 2019-06-06 ENCOUNTER — OFFICE VISIT (OUTPATIENT)
Dept: CARDIOLOGY CLINIC | Age: 84
End: 2019-06-06
Payer: MEDICARE

## 2019-06-06 ENCOUNTER — HOSPITAL ENCOUNTER (OUTPATIENT)
Age: 84
Discharge: HOME | End: 2019-06-06
Payer: MEDICARE

## 2019-06-06 VITALS
WEIGHT: 93.7 LBS | SYSTOLIC BLOOD PRESSURE: 122 MMHG | BODY MASS INDEX: 18.4 KG/M2 | HEART RATE: 64 BPM | HEIGHT: 60 IN | DIASTOLIC BLOOD PRESSURE: 80 MMHG

## 2019-06-06 DIAGNOSIS — I25.10 CORONARY ARTERY DISEASE INVOLVING NATIVE CORONARY ARTERY OF NATIVE HEART WITHOUT ANGINA PECTORIS: ICD-10-CM

## 2019-06-06 DIAGNOSIS — I35.0 NONRHEUMATIC AORTIC VALVE STENOSIS: Primary | ICD-10-CM

## 2019-06-06 DIAGNOSIS — I10 ESSENTIAL HYPERTENSION: ICD-10-CM

## 2019-06-06 DIAGNOSIS — E78.00 HYPERCHOLESTEREMIA: ICD-10-CM

## 2019-06-06 DIAGNOSIS — I35.0 NONRHEUMATIC AORTIC VALVE STENOSIS: ICD-10-CM

## 2019-06-06 DIAGNOSIS — R19.8 OTHER SPECIFIED SYMPTOMS AND SIGNS INVOLVING THE DIGESTIVE SYSTEM AND ABDOMEN: ICD-10-CM

## 2019-06-06 LAB
ANION GAP SERPL CALCULATED.3IONS-SCNC: 17 MMOL/L (ref 3–16)
BUN BLDV-MCNC: 15 MG/DL (ref 7–20)
CALCIUM SERPL-MCNC: 10.3 MG/DL (ref 8.3–10.6)
CHLORIDE BLD-SCNC: 103 MMOL/L (ref 99–110)
CO2: 27 MMOL/L (ref 21–32)
CREAT SERPL-MCNC: 1 MG/DL (ref 0.6–1.2)
GFR AFRICAN AMERICAN: >60
GFR NON-AFRICAN AMERICAN: 52
GLUCOSE BLD-MCNC: 121 MG/DL (ref 70–99)
HCT VFR BLD CALC: 41.5 % (ref 36–48)
HEMOGLOBIN: 13.9 G/DL (ref 12–16)
MCH RBC QN AUTO: 31.3 PG (ref 26–34)
MCHC RBC AUTO-ENTMCNC: 33.4 G/DL (ref 31–36)
MCV RBC AUTO: 93.7 FL (ref 80–100)
PDW BLD-RTO: 14.6 % (ref 12.4–15.4)
PLATELET # BLD: 242 K/UL (ref 135–450)
PMV BLD AUTO: 9 FL (ref 5–10.5)
POTASSIUM SERPL-SCNC: 5.5 MMOL/L (ref 3.5–5.1)
RBC # BLD: 4.43 M/UL (ref 4–5.2)
SODIUM BLD-SCNC: 147 MMOL/L (ref 136–145)
TSH REFLEX: 1.28 UIU/ML (ref 0.27–4.2)
WBC # BLD: 7 K/UL (ref 4–11)

## 2019-06-06 PROCEDURE — G8427 DOCREV CUR MEDS BY ELIG CLIN: HCPCS | Performed by: INTERNAL MEDICINE

## 2019-06-06 PROCEDURE — 36415 COLL VENOUS BLD VENIPUNCTURE: CPT

## 2019-06-06 PROCEDURE — 1123F ACP DISCUSS/DSCN MKR DOCD: CPT | Performed by: INTERNAL MEDICINE

## 2019-06-06 PROCEDURE — 99214 OFFICE O/P EST MOD 30 MIN: CPT | Performed by: INTERNAL MEDICINE

## 2019-06-06 PROCEDURE — G8419 CALC BMI OUT NRM PARAM NOF/U: HCPCS | Performed by: INTERNAL MEDICINE

## 2019-06-06 PROCEDURE — 80048 BASIC METABOLIC PNL TOTAL CA: CPT

## 2019-06-06 PROCEDURE — 1090F PRES/ABSN URINE INCON ASSESS: CPT | Performed by: INTERNAL MEDICINE

## 2019-06-06 PROCEDURE — 84443 ASSAY THYROID STIM HORMONE: CPT

## 2019-06-06 PROCEDURE — 85027 COMPLETE CBC AUTOMATED: CPT

## 2019-06-06 PROCEDURE — 4040F PNEUMOC VAC/ADMIN/RCVD: CPT | Performed by: INTERNAL MEDICINE

## 2019-06-06 PROCEDURE — 1036F TOBACCO NON-USER: CPT | Performed by: INTERNAL MEDICINE

## 2019-06-06 PROCEDURE — G8598 ASA/ANTIPLAT THER USED: HCPCS | Performed by: INTERNAL MEDICINE

## 2019-06-06 NOTE — LETTER
[address] [address] [address] [address]  Phone: [phone]  Fax: [phone]    Jeffery Mccartney MD        June 6, 2019     Dalton Marie, [address]  [facility] [facility] [address]    Patient: Ariel Michelle  MR Number: [id number]  YOB: 1930  Date of Visit: 6/6/2019    Dear Dr. Dalton Marie:      [address]       H+P // CONSULT // OUTPATIENT VISIT // Geetha Moyer     Referring Doctor Dalton Marie, DO   Encounter Type Followup     CHIEF COMPLAINT     Visit Type Chronic   Symptoms Fatigue   Problems AS s/p TAVR, CAD, HTN, CHOL     HISTORY OF PRESENT ILLNESS     ? GEN - Doing well except fatigue which is chronic, no new concerns. Poor appetite, recent esophageal stretch. ? AS - s/p TAVR 1/18. Last echo stable. Denies cp, sob, dizziness, syncope. ? CAD - Denies cp, sob, dizziness, syncope, palpitations. ? HTN - Ambulatory BP readings in good range. No HA or dizziness. ? CHOL - Last cholesterol reviewed and in good range. ? MED - Compliant with CV meds listed below without notable side effects     COMPLIANCE     Category Meds Diet Salt Exercise Tobacco Alcohol Drugs   Compliant [x] [] [] [] [x] [x] [x]   [x]Counseling given on all above above categories    HISTORY/ALLERGIES/ROS     MedHx:  has a past medical history of Anxiety, Aortic stenosis, severe, CAD (coronary artery disease), CHF (congestive heart failure) ([facility] [address] Hyperlipidemia, and Hypertension. SurgHx:  has a past surgical history that includes Cholecystectomy; back surgery; Tonsillectomy; Coronary angioplasty with stent (12/2017); and Aortic valve surgery (01/2018). SocHx:   reports that she has never smoked. She has never used smokeless tobacco. She reports that she does not drink alcohol or use drugs. FamHx: family history includes Cancer in her sister; Other in her mother. Allergies: Patient has no known allergies. ROS:  [x]Full ROS obtained and negative except as mentioned in HPI     MEDICATIONS      Current Outpatient Medications   Medication Sig Dispense Refill    amLODIPine (NORVASC) 5 MG tablet TAKE 1 TABLET BY MOUTH EVERY DAY 90 tablet 3    lisinopril (PRINIVIL;ZESTRIL) 40 MG tablet TAKE 1 TABLET BY MOUTH DAILY 90 tablet 3    aspirin 81 MG chewable tablet CHEW AND SWALLOW ONE TABLET DAILY 30 tablet 5    metoprolol succinate (TOPROL XL) 25 MG extended release tablet Take 1 tablet by mouth daily 90 tablet 3    amLODIPine (NORVASC) 5 MG tablet TAKE 1 TABLET BY MOUTH EVERY DAY 90 tablet 2    furosemide (LASIX) 20 MG tablet Take 1 tablet by mouth daily 90 tablet 3    atorvastatin (LIPITOR) 80 MG tablet TAKE 1 TABLET BY MOUTH EVERY NIGHT AT BEDTIME 90 tablet 3    lisinopril (PRINIVIL;ZESTRIL) 40 MG tablet Take 1 tablet by mouth daily 90 tablet 3    isosorbide mononitrate (IMDUR) 30 MG extended release tablet Take 1 tablet by mouth daily 90 tablet 3    atorvastatin (LIPITOR) 80 MG tablet Take 0.5 tablets by mouth nightly 90 tablet 3    clopidogrel (PLAVIX) 75 MG tablet Take 1 tablet by mouth daily 90 tablet 3    acetaminophen (TYLENOL) 325 MG tablet Take 2 tablets by mouth every 6 hours as needed for Pain or Fever 120 tablet 3     No current facility-administered medications for this visit.       Reviewed with patient and will remain unchanged except as mentioned in A/P  PHYSICAL EXAM     Vitals:    06/06/19 1255   BP: 122/80   Pulse: 64      Gen Alert, coop, no distress Heart  Rrr, 1/6   Head NC, AT, no abnorm Abd  Soft, NT, +BS, no mass, no OM   Eyes PER, conj/corn clear Ext  Ext nl, AT, no C/C/E   Nose Nares nl, no drain, NT Pulse 2+ and symmetric   Throat Lips, mucosa, tongue nl Skin Col/text/turg nl, no vis rash/les   Neck S/S, TM, NT, no bruit/JVD Psych Nl mood and affect   Lung CTA-B, unlabored, no DTP Lymph   No cervical or axillary LA   Ch wall NT, no deform Neuro  Nl gross M/S exam     ASSESSMENT AND PLAN

## 2019-06-06 NOTE — PROGRESS NOTES
[address]       H+P // CONSULT // OUTPATIENT VISIT // Alease Peralta     Referring Doctor Anna Mills DO   Encounter Type Followup     CHIEF COMPLAINT     Visit Type Chronic   Symptoms Fatigue   Problems AS s/p TAVR, CAD, HTN, CHOL     HISTORY OF PRESENT ILLNESS      GEN - Doing well except fatigue which is chronic, no new concerns. Poor appetite, recent esophageal stretch.  AS - s/p TAVR 1/18. Last echo stable. Denies cp, sob, dizziness, syncope.  CAD - Denies cp, sob, dizziness, syncope, palpitations.  HTN - Ambulatory BP readings in good range. No HA or dizziness.  CHOL - Last cholesterol reviewed and in good range.  MED - Compliant with CV meds listed below without notable side effects     COMPLIANCE     Category Meds Diet Salt Exercise Tobacco Alcohol Drugs   Compliant [x] [] [] [] [x] [x] [x]   [x]Counseling given on all above above categories    HISTORY/ALLERGIES/ROS     MedHx:  has a past medical history of Anxiety, Aortic stenosis, severe, CAD (coronary artery disease), CHF (congestive heart failure) ([facility] [address] Hyperlipidemia, and Hypertension. SurgHx:  has a past surgical history that includes Cholecystectomy; back surgery; Tonsillectomy; Coronary angioplasty with stent (12/2017); and Aortic valve surgery (01/2018). SocHx:   reports that she has never smoked. She has never used smokeless tobacco. She reports that she does not drink alcohol or use drugs. FamHx: family history includes Cancer in her sister; Other in her mother. Allergies: Patient has no known allergies.    ROS:  [x]Full ROS obtained and negative except as mentioned in HPI     MEDICATIONS      Current Outpatient Medications   Medication Sig Dispense Refill    amLODIPine (NORVASC) 5 MG tablet TAKE 1 TABLET BY MOUTH EVERY DAY 90 tablet 3    lisinopril (PRINIVIL;ZESTRIL) 40 MG tablet TAKE 1 TABLET BY MOUTH DAILY 90 tablet 3    aspirin 81 MG chewable tablet CHEW AND SWALLOW ONE TABLET DAILY 30 tablet 5    metoprolol succinate (TOPROL XL) 25 MG extended release tablet Take 1 tablet by mouth daily 90 tablet 3    amLODIPine (NORVASC) 5 MG tablet TAKE 1 TABLET BY MOUTH EVERY DAY 90 tablet 2    furosemide (LASIX) 20 MG tablet Take 1 tablet by mouth daily 90 tablet 3    atorvastatin (LIPITOR) 80 MG tablet TAKE 1 TABLET BY MOUTH EVERY NIGHT AT BEDTIME 90 tablet 3    lisinopril (PRINIVIL;ZESTRIL) 40 MG tablet Take 1 tablet by mouth daily 90 tablet 3    isosorbide mononitrate (IMDUR) 30 MG extended release tablet Take 1 tablet by mouth daily 90 tablet 3    atorvastatin (LIPITOR) 80 MG tablet Take 0.5 tablets by mouth nightly 90 tablet 3    clopidogrel (PLAVIX) 75 MG tablet Take 1 tablet by mouth daily 90 tablet 3    acetaminophen (TYLENOL) 325 MG tablet Take 2 tablets by mouth every 6 hours as needed for Pain or Fever 120 tablet 3     No current facility-administered medications for this visit.       Reviewed with patient and will remain unchanged except as mentioned in A/P  PHYSICAL EXAM     Vitals:    06/06/19 1255   BP: 122/80   Pulse: 64      Gen Alert, coop, no distress Heart  Rrr, 1/6   Head NC, AT, no abnorm Abd  Soft, NT, +BS, no mass, no OM   Eyes PER, conj/corn clear Ext  Ext nl, AT, no C/C/E   Nose Nares nl, no drain, NT Pulse 2+ and symmetric   Throat Lips, mucosa, tongue nl Skin Col/text/turg nl, no vis rash/les   Neck S/S, TM, NT, no bruit/JVD Psych Nl mood and affect   Lung CTA-B, unlabored, no DTP Lymph   No cervical or axillary LA   Ch wall NT, no deform Neuro  Nl gross M/S exam     ASSESSMENT AND PLAN     ~AS   Date EF Detail   Sx   SOB with exertion   Hx 1/9/18  TAVR with ES3 20mm   TTE 10/17  1/18  2/18  12/18 60%  65%  60%  60% AS MG 43, mild AI  TAVR MG 11, mild AI  TAVR MG 17, mild AI  TAVR MG 11, mild AI   EKG 1/18  NSR, LVH, QRS 98 (PostTAVR)   Plan   Echo yearly or with symptom change   ~CAD   Date EF Results   Sx   No concerning   Hx   SVPCI   C 11/17  2 CAITLIN to Cx Saji Fuss)   Plan

## 2019-07-28 DIAGNOSIS — I42.0 CONGESTIVE CARDIOMYOPATHY (HCC): ICD-10-CM

## 2019-07-29 RX ORDER — FUROSEMIDE 20 MG/1
20 TABLET ORAL DAILY
Qty: 90 TABLET | Refills: 0 | Status: SHIPPED | OUTPATIENT
Start: 2019-07-29 | End: 2019-01-01 | Stop reason: SDUPTHER

## 2019-07-29 RX ORDER — ISOSORBIDE MONONITRATE 30 MG/1
30 TABLET, EXTENDED RELEASE ORAL DAILY
Qty: 90 TABLET | Refills: 0 | Status: SHIPPED | OUTPATIENT
Start: 2019-07-29 | End: 2019-01-01 | Stop reason: SDUPTHER

## 2019-08-05 RX ORDER — CLOPIDOGREL BISULFATE 75 MG/1
75 TABLET ORAL DAILY
Qty: 90 TABLET | Refills: 0 | Status: ON HOLD | OUTPATIENT
Start: 2019-08-05 | End: 2020-01-01

## 2019-08-05 RX ORDER — METOPROLOL SUCCINATE 25 MG/1
25 TABLET, EXTENDED RELEASE ORAL DAILY
Qty: 90 TABLET | Refills: 0 | Status: ON HOLD | OUTPATIENT
Start: 2019-08-05 | End: 2020-01-01

## 2020-01-01 ENCOUNTER — CARE COORDINATION (OUTPATIENT)
Dept: CASE MANAGEMENT | Age: 85
End: 2020-01-01

## 2020-01-01 ENCOUNTER — APPOINTMENT (OUTPATIENT)
Dept: GENERAL RADIOLOGY | Age: 85
End: 2020-01-01
Payer: MEDICARE

## 2020-01-01 ENCOUNTER — APPOINTMENT (OUTPATIENT)
Dept: GENERAL RADIOLOGY | Age: 85
DRG: 981 | End: 2020-01-01
Payer: MEDICARE

## 2020-01-01 ENCOUNTER — TELEPHONE (OUTPATIENT)
Dept: FAMILY MEDICINE CLINIC | Age: 85
End: 2020-01-01

## 2020-01-01 ENCOUNTER — HOSPITAL ENCOUNTER (EMERGENCY)
Age: 85
Discharge: HOME | End: 2020-08-02
Attending: EMERGENCY MEDICINE
Payer: MEDICARE

## 2020-01-01 ENCOUNTER — APPOINTMENT (OUTPATIENT)
Dept: CT IMAGING | Age: 85
End: 2020-01-01
Payer: MEDICARE

## 2020-01-01 ENCOUNTER — HOSPITAL ENCOUNTER (EMERGENCY)
Age: 85
Discharge: HOME | DRG: 981 | End: 2020-01-29
Attending: EMERGENCY MEDICINE
Payer: MEDICARE

## 2020-01-01 ENCOUNTER — APPOINTMENT (OUTPATIENT)
Dept: GENERAL RADIOLOGY | Age: 85
DRG: 055 | End: 2020-01-01
Payer: MEDICARE

## 2020-01-01 ENCOUNTER — TELEPHONE (OUTPATIENT)
Dept: CARDIOLOGY CLINIC | Age: 85
End: 2020-01-01

## 2020-01-01 ENCOUNTER — HOSPITAL ENCOUNTER (INPATIENT)
Age: 85
LOS: 3 days | Discharge: HOSPICE | DRG: 055 | End: 2020-08-19
Attending: EMERGENCY MEDICINE | Admitting: FAMILY MEDICINE
Payer: MEDICARE

## 2020-01-01 ENCOUNTER — APPOINTMENT (OUTPATIENT)
Dept: INTERVENTIONAL RADIOLOGY/VASCULAR | Age: 85
DRG: 981 | End: 2020-01-01
Payer: MEDICARE

## 2020-01-01 ENCOUNTER — OFFICE VISIT (OUTPATIENT)
Dept: FAMILY MEDICINE CLINIC | Age: 85
End: 2020-01-01
Payer: MEDICARE

## 2020-01-01 ENCOUNTER — APPOINTMENT (OUTPATIENT)
Dept: CT IMAGING | Age: 85
DRG: 055 | End: 2020-01-01
Payer: MEDICARE

## 2020-01-01 ENCOUNTER — HOSPITAL ENCOUNTER (INPATIENT)
Age: 85
LOS: 5 days | Discharge: NURSING, REHAB OR LONG-TERM CARE | DRG: 981 | End: 2020-02-06
Attending: INTERNAL MEDICINE | Admitting: INTERNAL MEDICINE
Payer: MEDICARE

## 2020-01-01 ENCOUNTER — OFFICE VISIT (OUTPATIENT)
Dept: CARDIOLOGY CLINIC | Age: 85
End: 2020-01-01
Payer: MEDICARE

## 2020-01-01 ENCOUNTER — APPOINTMENT (OUTPATIENT)
Dept: CT IMAGING | Age: 85
DRG: 981 | End: 2020-01-01
Payer: MEDICARE

## 2020-01-01 ENCOUNTER — APPOINTMENT (OUTPATIENT)
Dept: MRI IMAGING | Age: 85
DRG: 981 | End: 2020-01-01
Payer: MEDICARE

## 2020-01-01 VITALS
HEIGHT: 60 IN | WEIGHT: 86.2 LBS | RESPIRATION RATE: 18 BRPM | DIASTOLIC BLOOD PRESSURE: 63 MMHG | OXYGEN SATURATION: 97 % | HEART RATE: 62 BPM | TEMPERATURE: 98.2 F | BODY MASS INDEX: 16.92 KG/M2 | SYSTOLIC BLOOD PRESSURE: 120 MMHG

## 2020-01-01 VITALS
HEIGHT: 57 IN | BODY MASS INDEX: 21.62 KG/M2 | SYSTOLIC BLOOD PRESSURE: 120 MMHG | DIASTOLIC BLOOD PRESSURE: 78 MMHG | WEIGHT: 100.2 LBS

## 2020-01-01 VITALS
DIASTOLIC BLOOD PRESSURE: 70 MMHG | HEIGHT: 57 IN | SYSTOLIC BLOOD PRESSURE: 120 MMHG | WEIGHT: 85 LBS | BODY MASS INDEX: 18.34 KG/M2

## 2020-01-01 VITALS
BODY MASS INDEX: 19.69 KG/M2 | OXYGEN SATURATION: 96 % | DIASTOLIC BLOOD PRESSURE: 75 MMHG | HEART RATE: 67 BPM | TEMPERATURE: 97.8 F | WEIGHT: 100.31 LBS | RESPIRATION RATE: 16 BRPM | SYSTOLIC BLOOD PRESSURE: 139 MMHG | HEIGHT: 60 IN

## 2020-01-01 VITALS
RESPIRATION RATE: 18 BRPM | SYSTOLIC BLOOD PRESSURE: 138 MMHG | DIASTOLIC BLOOD PRESSURE: 58 MMHG | HEART RATE: 66 BPM | HEIGHT: 60 IN | BODY MASS INDEX: 17.08 KG/M2 | WEIGHT: 87 LBS | OXYGEN SATURATION: 99 % | TEMPERATURE: 97.8 F

## 2020-01-01 VITALS
HEART RATE: 62 BPM | DIASTOLIC BLOOD PRESSURE: 72 MMHG | BODY MASS INDEX: 19.24 KG/M2 | WEIGHT: 88.9 LBS | SYSTOLIC BLOOD PRESSURE: 136 MMHG

## 2020-01-01 VITALS
SYSTOLIC BLOOD PRESSURE: 131 MMHG | RESPIRATION RATE: 16 BRPM | DIASTOLIC BLOOD PRESSURE: 68 MMHG | TEMPERATURE: 97.5 F | OXYGEN SATURATION: 97 % | WEIGHT: 93.03 LBS | BODY MASS INDEX: 18.27 KG/M2 | HEART RATE: 58 BPM | HEIGHT: 60 IN

## 2020-01-01 LAB
A/G RATIO: 1.1 (ref 1.1–2.2)
A/G RATIO: 1.2 (ref 1.1–2.2)
ALBUMIN SERPL-MCNC: 2.9 G/DL (ref 3.1–4.9)
ALBUMIN SERPL-MCNC: 3.2 G/DL (ref 3.4–5)
ALBUMIN SERPL-MCNC: 3.3 G/DL (ref 3.4–5)
ALBUMIN SERPL-MCNC: 3.3 G/DL (ref 3.4–5)
ALBUMIN SERPL-MCNC: 3.5 G/DL (ref 3.4–5)
ALBUMIN SERPL-MCNC: 3.6 G/DL (ref 3.4–5)
ALBUMIN SERPL-MCNC: 3.7 G/DL (ref 3.4–5)
ALBUMIN SERPL-MCNC: 3.8 G/DL (ref 3.4–5)
ALP BLD-CCNC: 125 U/L (ref 40–129)
ALP BLD-CCNC: 149 U/L (ref 40–129)
ALP BLD-CCNC: 61 U/L (ref 40–129)
ALP BLD-CCNC: 71 U/L (ref 40–129)
ALP BLD-CCNC: 71 U/L (ref 40–129)
ALP BLD-CCNC: 80 U/L (ref 40–129)
ALPHA-1-GLOBULIN: 0.3 G/DL (ref 0.2–0.4)
ALPHA-2-GLOBULIN: 0.6 G/DL (ref 0.4–1.1)
ALT SERPL-CCNC: 12 U/L (ref 10–40)
ALT SERPL-CCNC: 13 U/L (ref 10–40)
ALT SERPL-CCNC: 18 U/L (ref 10–40)
ALT SERPL-CCNC: 7 U/L (ref 10–40)
ALT SERPL-CCNC: 8 U/L (ref 10–40)
ALT SERPL-CCNC: 8 U/L (ref 10–40)
ANION GAP SERPL CALCULATED.3IONS-SCNC: 11 MMOL/L (ref 3–16)
ANION GAP SERPL CALCULATED.3IONS-SCNC: 12 MMOL/L (ref 3–16)
ANION GAP SERPL CALCULATED.3IONS-SCNC: 13 MMOL/L (ref 3–16)
ANION GAP SERPL CALCULATED.3IONS-SCNC: 15 MMOL/L (ref 3–16)
ANION GAP SERPL CALCULATED.3IONS-SCNC: 15 MMOL/L (ref 3–16)
ANION GAP SERPL CALCULATED.3IONS-SCNC: 16 MMOL/L (ref 3–16)
ANION GAP SERPL CALCULATED.3IONS-SCNC: 18 MMOL/L (ref 3–16)
ANION GAP SERPL CALCULATED.3IONS-SCNC: 18 MMOL/L (ref 3–16)
ANION GAP SERPL CALCULATED.3IONS-SCNC: 19 MMOL/L (ref 3–16)
ANION GAP SERPL CALCULATED.3IONS-SCNC: 26 MMOL/L (ref 3–16)
AST SERPL-CCNC: 13 U/L (ref 15–37)
AST SERPL-CCNC: 15 U/L (ref 15–37)
AST SERPL-CCNC: 17 U/L (ref 15–37)
AST SERPL-CCNC: 18 U/L (ref 15–37)
AST SERPL-CCNC: 20 U/L (ref 15–37)
AST SERPL-CCNC: 26 U/L (ref 15–37)
BACTERIA: ABNORMAL /HPF
BASOPHILS ABSOLUTE: 0 K/UL (ref 0–0.2)
BASOPHILS ABSOLUTE: 0.1 K/UL (ref 0–0.2)
BASOPHILS RELATIVE PERCENT: 0.1 %
BASOPHILS RELATIVE PERCENT: 0.1 %
BASOPHILS RELATIVE PERCENT: 0.2 %
BASOPHILS RELATIVE PERCENT: 0.3 %
BASOPHILS RELATIVE PERCENT: 0.4 %
BASOPHILS RELATIVE PERCENT: 0.4 %
BASOPHILS RELATIVE PERCENT: 0.5 %
BASOPHILS RELATIVE PERCENT: 0.7 %
BASOPHILS RELATIVE PERCENT: 0.8 %
BETA GLOBULIN: 1.2 G/DL (ref 0.9–1.6)
BILIRUB SERPL-MCNC: 0.3 MG/DL (ref 0–1)
BILIRUB SERPL-MCNC: 0.4 MG/DL (ref 0–1)
BILIRUBIN URINE: ABNORMAL
BILIRUBIN URINE: ABNORMAL
BILIRUBIN URINE: NEGATIVE
BLOOD, URINE: ABNORMAL
BLOOD, URINE: NEGATIVE
BLOOD, URINE: NEGATIVE
BUN BLDV-MCNC: 11 MG/DL (ref 7–20)
BUN BLDV-MCNC: 13 MG/DL (ref 7–20)
BUN BLDV-MCNC: 16 MG/DL (ref 7–20)
BUN BLDV-MCNC: 21 MG/DL (ref 7–20)
BUN BLDV-MCNC: 22 MG/DL (ref 7–20)
BUN BLDV-MCNC: 27 MG/DL (ref 7–20)
BUN BLDV-MCNC: 35 MG/DL (ref 7–20)
BUN BLDV-MCNC: 42 MG/DL (ref 7–20)
BUN BLDV-MCNC: 43 MG/DL (ref 7–20)
BUN BLDV-MCNC: 63 MG/DL (ref 7–20)
BUN BLDV-MCNC: 65 MG/DL (ref 7–20)
BUN BLDV-MCNC: 8 MG/DL (ref 7–20)
BUN BLDV-MCNC: 80 MG/DL (ref 7–20)
CALCIUM SERPL-MCNC: 8.2 MG/DL (ref 8.3–10.6)
CALCIUM SERPL-MCNC: 8.4 MG/DL (ref 8.3–10.6)
CALCIUM SERPL-MCNC: 8.8 MG/DL (ref 8.3–10.6)
CALCIUM SERPL-MCNC: 8.8 MG/DL (ref 8.3–10.6)
CALCIUM SERPL-MCNC: 9.1 MG/DL (ref 8.3–10.6)
CALCIUM SERPL-MCNC: 9.2 MG/DL (ref 8.3–10.6)
CALCIUM SERPL-MCNC: 9.4 MG/DL (ref 8.3–10.6)
CALCIUM SERPL-MCNC: 9.5 MG/DL (ref 8.3–10.6)
CALCIUM SERPL-MCNC: 9.6 MG/DL (ref 8.3–10.6)
CHLORIDE BLD-SCNC: 101 MMOL/L (ref 99–110)
CHLORIDE BLD-SCNC: 101 MMOL/L (ref 99–110)
CHLORIDE BLD-SCNC: 102 MMOL/L (ref 99–110)
CHLORIDE BLD-SCNC: 104 MMOL/L (ref 99–110)
CHLORIDE BLD-SCNC: 108 MMOL/L (ref 99–110)
CHLORIDE BLD-SCNC: 93 MMOL/L (ref 99–110)
CHLORIDE BLD-SCNC: 98 MMOL/L (ref 99–110)
CHLORIDE BLD-SCNC: 99 MMOL/L (ref 99–110)
CHLORIDE BLD-SCNC: 99 MMOL/L (ref 99–110)
CLARITY: ABNORMAL
CLARITY: ABNORMAL
CLARITY: CLEAR
CO2: 14 MMOL/L (ref 21–32)
CO2: 20 MMOL/L (ref 21–32)
CO2: 21 MMOL/L (ref 21–32)
CO2: 22 MMOL/L (ref 21–32)
CO2: 23 MMOL/L (ref 21–32)
CO2: 24 MMOL/L (ref 21–32)
CO2: 25 MMOL/L (ref 21–32)
CO2: 27 MMOL/L (ref 21–32)
COLOR: YELLOW
CREAT SERPL-MCNC: 0.6 MG/DL (ref 0.6–1.2)
CREAT SERPL-MCNC: 0.6 MG/DL (ref 0.6–1.2)
CREAT SERPL-MCNC: 0.7 MG/DL (ref 0.6–1.2)
CREAT SERPL-MCNC: 0.8 MG/DL (ref 0.6–1.2)
CREAT SERPL-MCNC: 0.8 MG/DL (ref 0.6–1.2)
CREAT SERPL-MCNC: 1 MG/DL (ref 0.6–1.2)
CREAT SERPL-MCNC: 1.1 MG/DL (ref 0.6–1.2)
CREAT SERPL-MCNC: 1.6 MG/DL (ref 0.6–1.2)
CREAT SERPL-MCNC: 1.6 MG/DL (ref 0.6–1.2)
CREAT SERPL-MCNC: 1.7 MG/DL (ref 0.6–1.2)
CREAT SERPL-MCNC: 2.3 MG/DL (ref 0.6–1.2)
EKG ATRIAL RATE: 60 BPM
EKG ATRIAL RATE: 62 BPM
EKG ATRIAL RATE: 66 BPM
EKG DIAGNOSIS: NORMAL
EKG P AXIS: 59 DEGREES
EKG P AXIS: 63 DEGREES
EKG P AXIS: 70 DEGREES
EKG P-R INTERVAL: 164 MS
EKG P-R INTERVAL: 172 MS
EKG P-R INTERVAL: 182 MS
EKG Q-T INTERVAL: 420 MS
EKG Q-T INTERVAL: 432 MS
EKG Q-T INTERVAL: 436 MS
EKG QRS DURATION: 88 MS
EKG QRS DURATION: 94 MS
EKG QRS DURATION: 98 MS
EKG QTC CALCULATION (BAZETT): 420 MS
EKG QTC CALCULATION (BAZETT): 442 MS
EKG QTC CALCULATION (BAZETT): 452 MS
EKG R AXIS: -33 DEGREES
EKG R AXIS: -33 DEGREES
EKG R AXIS: 49 DEGREES
EKG T AXIS: 111 DEGREES
EKG T AXIS: 50 DEGREES
EKG T AXIS: 79 DEGREES
EKG VENTRICULAR RATE: 60 BPM
EKG VENTRICULAR RATE: 62 BPM
EKG VENTRICULAR RATE: 66 BPM
EOSINOPHILS ABSOLUTE: 0 K/UL (ref 0–0.6)
EOSINOPHILS ABSOLUTE: 0.1 K/UL (ref 0–0.6)
EOSINOPHILS ABSOLUTE: 0.2 K/UL (ref 0–0.6)
EOSINOPHILS ABSOLUTE: 0.2 K/UL (ref 0–0.6)
EOSINOPHILS RELATIVE PERCENT: 0 %
EOSINOPHILS RELATIVE PERCENT: 0 %
EOSINOPHILS RELATIVE PERCENT: 0.1 %
EOSINOPHILS RELATIVE PERCENT: 0.4 %
EOSINOPHILS RELATIVE PERCENT: 0.4 %
EOSINOPHILS RELATIVE PERCENT: 0.6 %
EOSINOPHILS RELATIVE PERCENT: 0.8 %
EOSINOPHILS RELATIVE PERCENT: 1.2 %
EOSINOPHILS RELATIVE PERCENT: 1.6 %
EOSINOPHILS RELATIVE PERCENT: 1.7 %
EOSINOPHILS RELATIVE PERCENT: 2.6 %
EPITHELIAL CELLS, UA: 0 /HPF (ref 0–5)
EPITHELIAL CELLS, UA: 1 /HPF (ref 0–5)
ETHANOL: NORMAL MG/DL (ref 0–0.08)
GAMMA GLOBULIN: 0.9 G/DL (ref 0.6–1.8)
GFR AFRICAN AMERICAN: 24
GFR AFRICAN AMERICAN: 34
GFR AFRICAN AMERICAN: 37
GFR AFRICAN AMERICAN: 37
GFR AFRICAN AMERICAN: 56
GFR AFRICAN AMERICAN: 57
GFR AFRICAN AMERICAN: 57
GFR AFRICAN AMERICAN: >60
GFR NON-AFRICAN AMERICAN: 20
GFR NON-AFRICAN AMERICAN: 28
GFR NON-AFRICAN AMERICAN: 30
GFR NON-AFRICAN AMERICAN: 30
GFR NON-AFRICAN AMERICAN: 47
GFR NON-AFRICAN AMERICAN: 52
GFR NON-AFRICAN AMERICAN: >60
GLOBULIN: 2.6 G/DL
GLOBULIN: 2.8 G/DL
GLOBULIN: 3 G/DL
GLOBULIN: 3.2 G/DL
GLOBULIN: 3.3 G/DL
GLOBULIN: 3.6 G/DL
GLUCOSE BLD-MCNC: 100 MG/DL (ref 70–99)
GLUCOSE BLD-MCNC: 103 MG/DL (ref 70–99)
GLUCOSE BLD-MCNC: 104 MG/DL (ref 70–99)
GLUCOSE BLD-MCNC: 104 MG/DL (ref 70–99)
GLUCOSE BLD-MCNC: 107 MG/DL (ref 70–99)
GLUCOSE BLD-MCNC: 107 MG/DL (ref 70–99)
GLUCOSE BLD-MCNC: 108 MG/DL (ref 70–99)
GLUCOSE BLD-MCNC: 109 MG/DL (ref 70–99)
GLUCOSE BLD-MCNC: 114 MG/DL (ref 70–99)
GLUCOSE BLD-MCNC: 116 MG/DL (ref 70–99)
GLUCOSE BLD-MCNC: 119 MG/DL (ref 70–99)
GLUCOSE BLD-MCNC: 122 MG/DL (ref 70–99)
GLUCOSE BLD-MCNC: 124 MG/DL (ref 70–99)
GLUCOSE BLD-MCNC: 126 MG/DL (ref 70–99)
GLUCOSE BLD-MCNC: 137 MG/DL (ref 70–99)
GLUCOSE BLD-MCNC: 138 MG/DL (ref 70–99)
GLUCOSE BLD-MCNC: 147 MG/DL (ref 70–99)
GLUCOSE BLD-MCNC: 152 MG/DL (ref 70–99)
GLUCOSE BLD-MCNC: 225 MG/DL (ref 70–99)
GLUCOSE BLD-MCNC: 67 MG/DL (ref 70–99)
GLUCOSE BLD-MCNC: 68 MG/DL (ref 70–99)
GLUCOSE BLD-MCNC: 69 MG/DL (ref 70–99)
GLUCOSE BLD-MCNC: 75 MG/DL (ref 70–99)
GLUCOSE BLD-MCNC: 75 MG/DL (ref 70–99)
GLUCOSE BLD-MCNC: 80 MG/DL (ref 70–99)
GLUCOSE BLD-MCNC: 84 MG/DL (ref 70–99)
GLUCOSE BLD-MCNC: 86 MG/DL (ref 70–99)
GLUCOSE BLD-MCNC: 86 MG/DL (ref 70–99)
GLUCOSE BLD-MCNC: 87 MG/DL (ref 70–99)
GLUCOSE BLD-MCNC: 89 MG/DL (ref 70–99)
GLUCOSE BLD-MCNC: 93 MG/DL (ref 70–99)
GLUCOSE BLD-MCNC: 98 MG/DL (ref 70–99)
GLUCOSE BLD-MCNC: 98 MG/DL (ref 70–99)
GLUCOSE URINE: NEGATIVE MG/DL
HCT VFR BLD CALC: 34.9 % (ref 36–48)
HCT VFR BLD CALC: 35.7 % (ref 36–48)
HCT VFR BLD CALC: 35.7 % (ref 36–48)
HCT VFR BLD CALC: 36 % (ref 36–48)
HCT VFR BLD CALC: 38.2 % (ref 36–48)
HCT VFR BLD CALC: 39 % (ref 36–48)
HCT VFR BLD CALC: 39.9 % (ref 36–48)
HCT VFR BLD CALC: 40 % (ref 36–48)
HCT VFR BLD CALC: 41.4 % (ref 36–48)
HCT VFR BLD CALC: 42.5 % (ref 36–48)
HCT VFR BLD CALC: 42.7 % (ref 36–48)
HEMOGLOBIN: 11.7 G/DL (ref 12–16)
HEMOGLOBIN: 11.8 G/DL (ref 12–16)
HEMOGLOBIN: 12 G/DL (ref 12–16)
HEMOGLOBIN: 12.3 G/DL (ref 12–16)
HEMOGLOBIN: 12.9 G/DL (ref 12–16)
HEMOGLOBIN: 13.2 G/DL (ref 12–16)
HEMOGLOBIN: 13.4 G/DL (ref 12–16)
HEMOGLOBIN: 13.5 G/DL (ref 12–16)
HEMOGLOBIN: 13.7 G/DL (ref 12–16)
HEMOGLOBIN: 14 G/DL (ref 12–16)
HEMOGLOBIN: 14.1 G/DL (ref 12–16)
HYALINE CASTS: 2 /LPF (ref 0–8)
HYALINE CASTS: 5 /LPF (ref 0–8)
INR BLD: 0.97 (ref 0.86–1.14)
INR BLD: 1.01 (ref 0.86–1.14)
KETONES, URINE: 40 MG/DL
KETONES, URINE: 40 MG/DL
KETONES, URINE: ABNORMAL MG/DL
LEUKOCYTE ESTERASE, URINE: ABNORMAL
LEUKOCYTE ESTERASE, URINE: NEGATIVE
LEUKOCYTE ESTERASE, URINE: NEGATIVE
LIPASE: 17 U/L (ref 13–60)
LYMPHOCYTES ABSOLUTE: 0.6 K/UL (ref 1–5.1)
LYMPHOCYTES ABSOLUTE: 0.6 K/UL (ref 1–5.1)
LYMPHOCYTES ABSOLUTE: 0.8 K/UL (ref 1–5.1)
LYMPHOCYTES ABSOLUTE: 0.8 K/UL (ref 1–5.1)
LYMPHOCYTES ABSOLUTE: 1 K/UL (ref 1–5.1)
LYMPHOCYTES ABSOLUTE: 1.1 K/UL (ref 1–5.1)
LYMPHOCYTES ABSOLUTE: 1.4 K/UL (ref 1–5.1)
LYMPHOCYTES ABSOLUTE: 1.4 K/UL (ref 1–5.1)
LYMPHOCYTES ABSOLUTE: 1.7 K/UL (ref 1–5.1)
LYMPHOCYTES ABSOLUTE: 1.9 K/UL (ref 1–5.1)
LYMPHOCYTES ABSOLUTE: 1.9 K/UL (ref 1–5.1)
LYMPHOCYTES RELATIVE PERCENT: 10.5 %
LYMPHOCYTES RELATIVE PERCENT: 10.7 %
LYMPHOCYTES RELATIVE PERCENT: 14.1 %
LYMPHOCYTES RELATIVE PERCENT: 18.3 %
LYMPHOCYTES RELATIVE PERCENT: 20.1 %
LYMPHOCYTES RELATIVE PERCENT: 21.1 %
LYMPHOCYTES RELATIVE PERCENT: 6.5 %
LYMPHOCYTES RELATIVE PERCENT: 7.4 %
LYMPHOCYTES RELATIVE PERCENT: 7.5 %
LYMPHOCYTES RELATIVE PERCENT: 8.7 %
LYMPHOCYTES RELATIVE PERCENT: 9.8 %
MAGNESIUM: 1.7 MG/DL (ref 1.8–2.4)
MAGNESIUM: 1.7 MG/DL (ref 1.8–2.4)
MAGNESIUM: 1.8 MG/DL (ref 1.8–2.4)
MCH RBC QN AUTO: 30.8 PG (ref 26–34)
MCH RBC QN AUTO: 30.9 PG (ref 26–34)
MCH RBC QN AUTO: 31.1 PG (ref 26–34)
MCH RBC QN AUTO: 31.1 PG (ref 26–34)
MCH RBC QN AUTO: 31.2 PG (ref 26–34)
MCH RBC QN AUTO: 31.2 PG (ref 26–34)
MCH RBC QN AUTO: 31.3 PG (ref 26–34)
MCH RBC QN AUTO: 31.4 PG (ref 26–34)
MCH RBC QN AUTO: 31.6 PG (ref 26–34)
MCH RBC QN AUTO: 31.9 PG (ref 26–34)
MCH RBC QN AUTO: 32.6 PG (ref 26–34)
MCHC RBC AUTO-ENTMCNC: 32.7 G/DL (ref 31–36)
MCHC RBC AUTO-ENTMCNC: 33.1 G/DL (ref 31–36)
MCHC RBC AUTO-ENTMCNC: 33.4 G/DL (ref 31–36)
MCHC RBC AUTO-ENTMCNC: 33.6 G/DL (ref 31–36)
MCHC RBC AUTO-ENTMCNC: 33.6 G/DL (ref 31–36)
MCHC RBC AUTO-ENTMCNC: 33.8 G/DL (ref 31–36)
MCHC RBC AUTO-ENTMCNC: 34.2 G/DL (ref 31–36)
MCV RBC AUTO: 91.8 FL (ref 80–100)
MCV RBC AUTO: 92 FL (ref 80–100)
MCV RBC AUTO: 92.2 FL (ref 80–100)
MCV RBC AUTO: 92.6 FL (ref 80–100)
MCV RBC AUTO: 93.3 FL (ref 80–100)
MCV RBC AUTO: 93.3 FL (ref 80–100)
MCV RBC AUTO: 93.8 FL (ref 80–100)
MCV RBC AUTO: 94.9 FL (ref 80–100)
MCV RBC AUTO: 95.3 FL (ref 80–100)
MCV RBC AUTO: 95.5 FL (ref 80–100)
MCV RBC AUTO: 96.6 FL (ref 80–100)
MICROSCOPIC EXAMINATION: ABNORMAL
MICROSCOPIC EXAMINATION: YES
MICROSCOPIC EXAMINATION: YES
MONOCYTES ABSOLUTE: 0.1 K/UL (ref 0–1.3)
MONOCYTES ABSOLUTE: 0.3 K/UL (ref 0–1.3)
MONOCYTES ABSOLUTE: 0.3 K/UL (ref 0–1.3)
MONOCYTES ABSOLUTE: 0.4 K/UL (ref 0–1.3)
MONOCYTES ABSOLUTE: 0.5 K/UL (ref 0–1.3)
MONOCYTES ABSOLUTE: 0.6 K/UL (ref 0–1.3)
MONOCYTES ABSOLUTE: 0.7 K/UL (ref 0–1.3)
MONOCYTES ABSOLUTE: 0.8 K/UL (ref 0–1.3)
MONOCYTES ABSOLUTE: 0.8 K/UL (ref 0–1.3)
MONOCYTES RELATIVE PERCENT: 0.9 %
MONOCYTES RELATIVE PERCENT: 2.7 %
MONOCYTES RELATIVE PERCENT: 3.4 %
MONOCYTES RELATIVE PERCENT: 4 %
MONOCYTES RELATIVE PERCENT: 4.6 %
MONOCYTES RELATIVE PERCENT: 4.7 %
MONOCYTES RELATIVE PERCENT: 5.8 %
MONOCYTES RELATIVE PERCENT: 5.9 %
MONOCYTES RELATIVE PERCENT: 7.1 %
MONOCYTES RELATIVE PERCENT: 7.5 %
MONOCYTES RELATIVE PERCENT: 9 %
NEUTROPHILS ABSOLUTE: 11.1 K/UL (ref 1.7–7.7)
NEUTROPHILS ABSOLUTE: 11.5 K/UL (ref 1.7–7.7)
NEUTROPHILS ABSOLUTE: 15 K/UL (ref 1.7–7.7)
NEUTROPHILS ABSOLUTE: 4.8 K/UL (ref 1.7–7.7)
NEUTROPHILS ABSOLUTE: 6.1 K/UL (ref 1.7–7.7)
NEUTROPHILS ABSOLUTE: 6.3 K/UL (ref 1.7–7.7)
NEUTROPHILS ABSOLUTE: 7.4 K/UL (ref 1.7–7.7)
NEUTROPHILS ABSOLUTE: 7.6 K/UL (ref 1.7–7.7)
NEUTROPHILS ABSOLUTE: 7.7 K/UL (ref 1.7–7.7)
NEUTROPHILS ABSOLUTE: 7.9 K/UL (ref 1.7–7.7)
NEUTROPHILS ABSOLUTE: 8.2 K/UL (ref 1.7–7.7)
NEUTROPHILS RELATIVE PERCENT: 68.4 %
NEUTROPHILS RELATIVE PERCENT: 69 %
NEUTROPHILS RELATIVE PERCENT: 74.7 %
NEUTROPHILS RELATIVE PERCENT: 76.4 %
NEUTROPHILS RELATIVE PERCENT: 81.4 %
NEUTROPHILS RELATIVE PERCENT: 83.9 %
NEUTROPHILS RELATIVE PERCENT: 85.4 %
NEUTROPHILS RELATIVE PERCENT: 86.5 %
NEUTROPHILS RELATIVE PERCENT: 88.5 %
NEUTROPHILS RELATIVE PERCENT: 90.5 %
NEUTROPHILS RELATIVE PERCENT: 91.5 %
NITRITE, URINE: NEGATIVE
NITRITE, URINE: NEGATIVE
NITRITE, URINE: POSITIVE
ORGANISM: ABNORMAL
PARATHYROID HORMONE INTACT: 60.8 PG/ML (ref 14–72)
PDW BLD-RTO: 13.6 % (ref 12.4–15.4)
PDW BLD-RTO: 13.8 % (ref 12.4–15.4)
PDW BLD-RTO: 13.8 % (ref 12.4–15.4)
PDW BLD-RTO: 13.9 % (ref 12.4–15.4)
PDW BLD-RTO: 14 % (ref 12.4–15.4)
PDW BLD-RTO: 14.8 % (ref 12.4–15.4)
PDW BLD-RTO: 14.9 % (ref 12.4–15.4)
PDW BLD-RTO: 14.9 % (ref 12.4–15.4)
PDW BLD-RTO: 15 % (ref 12.4–15.4)
PERFORMED ON: ABNORMAL
PERFORMED ON: NORMAL
PH UA: 5 (ref 5–8)
PH UA: 5.5 (ref 5–8)
PH UA: 5.5 (ref 5–8)
PHOSPHORUS: 3.4 MG/DL (ref 2.5–4.9)
PLATELET # BLD: 123 K/UL (ref 135–450)
PLATELET # BLD: 124 K/UL (ref 135–450)
PLATELET # BLD: 128 K/UL (ref 135–450)
PLATELET # BLD: 140 K/UL (ref 135–450)
PLATELET # BLD: 142 K/UL (ref 135–450)
PLATELET # BLD: 143 K/UL (ref 135–450)
PLATELET # BLD: 151 K/UL (ref 135–450)
PLATELET # BLD: 155 K/UL (ref 135–450)
PLATELET # BLD: 159 K/UL (ref 135–450)
PLATELET # BLD: 186 K/UL (ref 135–450)
PLATELET # BLD: 196 K/UL (ref 135–450)
PMV BLD AUTO: 10.1 FL (ref 5–10.5)
PMV BLD AUTO: 8.6 FL (ref 5–10.5)
PMV BLD AUTO: 8.8 FL (ref 5–10.5)
PMV BLD AUTO: 8.9 FL (ref 5–10.5)
PMV BLD AUTO: 9 FL (ref 5–10.5)
PMV BLD AUTO: 9.3 FL (ref 5–10.5)
PMV BLD AUTO: 9.4 FL (ref 5–10.5)
PMV BLD AUTO: 9.6 FL (ref 5–10.5)
PMV BLD AUTO: 9.9 FL (ref 5–10.5)
POTASSIUM REFLEX MAGNESIUM: 3.1 MMOL/L (ref 3.5–5.1)
POTASSIUM REFLEX MAGNESIUM: 3.3 MMOL/L (ref 3.5–5.1)
POTASSIUM REFLEX MAGNESIUM: 3.5 MMOL/L (ref 3.5–5.1)
POTASSIUM REFLEX MAGNESIUM: 3.7 MMOL/L (ref 3.5–5.1)
POTASSIUM REFLEX MAGNESIUM: 3.8 MMOL/L (ref 3.5–5.1)
POTASSIUM REFLEX MAGNESIUM: 3.9 MMOL/L (ref 3.5–5.1)
POTASSIUM REFLEX MAGNESIUM: 4 MMOL/L (ref 3.5–5.1)
POTASSIUM REFLEX MAGNESIUM: 4.2 MMOL/L (ref 3.5–5.1)
POTASSIUM REFLEX MAGNESIUM: 4.3 MMOL/L (ref 3.5–5.1)
POTASSIUM REFLEX MAGNESIUM: 4.5 MMOL/L (ref 3.5–5.1)
POTASSIUM REFLEX MAGNESIUM: 4.7 MMOL/L (ref 3.5–5.1)
POTASSIUM SERPL-SCNC: 4.3 MMOL/L (ref 3.5–5.1)
POTASSIUM SERPL-SCNC: 4.5 MMOL/L (ref 3.5–5.1)
POTASSIUM SERPL-SCNC: 5.8 MMOL/L (ref 3.5–5.1)
PRO-BNP: 288 PG/ML (ref 0–449)
PROTEIN UA: ABNORMAL MG/DL
PROTEIN UA: NEGATIVE MG/DL
PROTEIN UA: NEGATIVE MG/DL
PROTHROMBIN TIME: 11.2 SEC (ref 10–13.2)
PROTHROMBIN TIME: 11.7 SEC (ref 10–13.2)
RBC # BLD: 3.74 M/UL (ref 4–5.2)
RBC # BLD: 3.83 M/UL (ref 4–5.2)
RBC # BLD: 3.89 M/UL (ref 4–5.2)
RBC # BLD: 3.91 M/UL (ref 4–5.2)
RBC # BLD: 4.04 M/UL (ref 4–5.2)
RBC # BLD: 4.14 M/UL (ref 4–5.2)
RBC # BLD: 4.2 M/UL (ref 4–5.2)
RBC # BLD: 4.31 M/UL (ref 4–5.2)
RBC # BLD: 4.42 M/UL (ref 4–5.2)
RBC # BLD: 4.45 M/UL (ref 4–5.2)
RBC # BLD: 4.48 M/UL (ref 4–5.2)
RBC UA: 1 /HPF (ref 0–4)
RBC UA: 1 /HPF (ref 0–4)
SODIUM BLD-SCNC: 133 MMOL/L (ref 136–145)
SODIUM BLD-SCNC: 135 MMOL/L (ref 136–145)
SODIUM BLD-SCNC: 136 MMOL/L (ref 136–145)
SODIUM BLD-SCNC: 138 MMOL/L (ref 136–145)
SODIUM BLD-SCNC: 139 MMOL/L (ref 136–145)
SODIUM BLD-SCNC: 140 MMOL/L (ref 136–145)
SODIUM BLD-SCNC: 141 MMOL/L (ref 136–145)
SODIUM BLD-SCNC: 141 MMOL/L (ref 136–145)
SODIUM BLD-SCNC: 142 MMOL/L (ref 136–145)
SPE/IFE INTERPRETATION: NORMAL
SPECIFIC GRAVITY UA: 1.02 (ref 1–1.03)
TOTAL CK: 27 U/L (ref 26–192)
TOTAL CK: 88 U/L (ref 26–192)
TOTAL PROTEIN: 5.8 G/DL (ref 6.4–8.2)
TOTAL PROTEIN: 6 G/DL (ref 6.4–8.2)
TOTAL PROTEIN: 6.1 G/DL (ref 6.4–8.2)
TOTAL PROTEIN: 6.7 G/DL (ref 6.4–8.2)
TOTAL PROTEIN: 6.7 G/DL (ref 6.4–8.2)
TOTAL PROTEIN: 6.9 G/DL (ref 6.4–8.2)
TOTAL PROTEIN: 7.4 G/DL (ref 6.4–8.2)
TROPONIN: 0.07 NG/ML
TROPONIN: 0.07 NG/ML
TROPONIN: <0.01 NG/ML
TROPONIN: <0.01 NG/ML
URINE CULTURE, ROUTINE: ABNORMAL
URINE REFLEX TO CULTURE: ABNORMAL
URINE REFLEX TO CULTURE: ABNORMAL
URINE TYPE: ABNORMAL
UROBILINOGEN, URINE: 1 E.U./DL
VITAMIN D 25-HYDROXY: <5 NG/ML
WBC # BLD: 10.5 K/UL (ref 4–11)
WBC # BLD: 12.7 K/UL (ref 4–11)
WBC # BLD: 12.9 K/UL (ref 4–11)
WBC # BLD: 17.5 K/UL (ref 4–11)
WBC # BLD: 7 K/UL (ref 4–11)
WBC # BLD: 7.8 K/UL (ref 4–11)
WBC # BLD: 8.3 K/UL (ref 4–11)
WBC # BLD: 8.7 K/UL (ref 4–11)
WBC # BLD: 9 K/UL (ref 4–11)
WBC # BLD: 9.7 K/UL (ref 4–11)
WBC # BLD: 9.8 K/UL (ref 4–11)
WBC UA: 1 /HPF (ref 0–5)
WBC UA: 81 /HPF (ref 0–5)

## 2020-01-01 PROCEDURE — 99283 EMERGENCY DEPT VISIT LOW MDM: CPT

## 2020-01-01 PROCEDURE — 97162 PT EVAL MOD COMPLEX 30 MIN: CPT

## 2020-01-01 PROCEDURE — G8484 FLU IMMUNIZE NO ADMIN: HCPCS | Performed by: FAMILY MEDICINE

## 2020-01-01 PROCEDURE — 1200000000 HC SEMI PRIVATE

## 2020-01-01 PROCEDURE — 2500000003 HC RX 250 WO HCPCS: Performed by: NURSE PRACTITIONER

## 2020-01-01 PROCEDURE — 1111F DSCHRG MED/CURRENT MED MERGE: CPT | Performed by: FAMILY MEDICINE

## 2020-01-01 PROCEDURE — 71045 X-RAY EXAM CHEST 1 VIEW: CPT

## 2020-01-01 PROCEDURE — G8420 CALC BMI NORM PARAMETERS: HCPCS | Performed by: FAMILY MEDICINE

## 2020-01-01 PROCEDURE — 81001 URINALYSIS AUTO W/SCOPE: CPT

## 2020-01-01 PROCEDURE — 97530 THERAPEUTIC ACTIVITIES: CPT

## 2020-01-01 PROCEDURE — 80048 BASIC METABOLIC PNL TOTAL CA: CPT

## 2020-01-01 PROCEDURE — 72131 CT LUMBAR SPINE W/O DYE: CPT

## 2020-01-01 PROCEDURE — 95819 EEG AWAKE AND ASLEEP: CPT

## 2020-01-01 PROCEDURE — 96360 HYDRATION IV INFUSION INIT: CPT

## 2020-01-01 PROCEDURE — 6360000002 HC RX W HCPCS: Performed by: INTERNAL MEDICINE

## 2020-01-01 PROCEDURE — 80053 COMPREHEN METABOLIC PANEL: CPT

## 2020-01-01 PROCEDURE — 82306 VITAMIN D 25 HYDROXY: CPT

## 2020-01-01 PROCEDURE — 6360000002 HC RX W HCPCS: Performed by: NURSE PRACTITIONER

## 2020-01-01 PROCEDURE — 2580000003 HC RX 258: Performed by: FAMILY MEDICINE

## 2020-01-01 PROCEDURE — 93005 ELECTROCARDIOGRAM TRACING: CPT | Performed by: NURSE PRACTITIONER

## 2020-01-01 PROCEDURE — 6370000000 HC RX 637 (ALT 250 FOR IP): Performed by: HOSPITALIST

## 2020-01-01 PROCEDURE — 2580000003 HC RX 258: Performed by: RADIOLOGY

## 2020-01-01 PROCEDURE — 87086 URINE CULTURE/COLONY COUNT: CPT

## 2020-01-01 PROCEDURE — 6370000000 HC RX 637 (ALT 250 FOR IP): Performed by: NURSE PRACTITIONER

## 2020-01-01 PROCEDURE — 6360000002 HC RX W HCPCS: Performed by: FAMILY MEDICINE

## 2020-01-01 PROCEDURE — 85610 PROTHROMBIN TIME: CPT

## 2020-01-01 PROCEDURE — 6370000000 HC RX 637 (ALT 250 FOR IP): Performed by: INTERNAL MEDICINE

## 2020-01-01 PROCEDURE — 36415 COLL VENOUS BLD VENIPUNCTURE: CPT

## 2020-01-01 PROCEDURE — G8427 DOCREV CUR MEDS BY ELIG CLIN: HCPCS | Performed by: INTERNAL MEDICINE

## 2020-01-01 PROCEDURE — 6370000000 HC RX 637 (ALT 250 FOR IP): Performed by: FAMILY MEDICINE

## 2020-01-01 PROCEDURE — 85025 COMPLETE CBC W/AUTO DIFF WBC: CPT

## 2020-01-01 PROCEDURE — 97535 SELF CARE MNGMENT TRAINING: CPT

## 2020-01-01 PROCEDURE — 99213 OFFICE O/P EST LOW 20 MIN: CPT | Performed by: FAMILY MEDICINE

## 2020-01-01 PROCEDURE — 83690 ASSAY OF LIPASE: CPT

## 2020-01-01 PROCEDURE — 84484 ASSAY OF TROPONIN QUANT: CPT

## 2020-01-01 PROCEDURE — 2580000003 HC RX 258: Performed by: NURSE PRACTITIONER

## 2020-01-01 PROCEDURE — 1090F PRES/ABSN URINE INCON ASSESS: CPT | Performed by: INTERNAL MEDICINE

## 2020-01-01 PROCEDURE — 4040F PNEUMOC VAC/ADMIN/RCVD: CPT | Performed by: INTERNAL MEDICINE

## 2020-01-01 PROCEDURE — 82550 ASSAY OF CK (CPK): CPT

## 2020-01-01 PROCEDURE — 87186 SC STD MICRODIL/AGAR DIL: CPT

## 2020-01-01 PROCEDURE — 6360000002 HC RX W HCPCS: Performed by: RADIOLOGY

## 2020-01-01 PROCEDURE — 93010 ELECTROCARDIOGRAM REPORT: CPT | Performed by: INTERNAL MEDICINE

## 2020-01-01 PROCEDURE — 97129 THER IVNTJ 1ST 15 MIN: CPT

## 2020-01-01 PROCEDURE — 2709999900 IR KYPHOPLASTY THORACIC/LUMBAR EACH ADDL VERTEBRAL BODY

## 2020-01-01 PROCEDURE — 94760 N-INVAS EAR/PLS OXIMETRY 1: CPT

## 2020-01-01 PROCEDURE — 2580000003 HC RX 258: Performed by: EMERGENCY MEDICINE

## 2020-01-01 PROCEDURE — 2580000003 HC RX 258: Performed by: INTERNAL MEDICINE

## 2020-01-01 PROCEDURE — 97166 OT EVAL MOD COMPLEX 45 MIN: CPT

## 2020-01-01 PROCEDURE — 0PU43JZ SUPPLEMENT THORACIC VERTEBRA WITH SYNTHETIC SUBSTITUTE, PERCUTANEOUS APPROACH: ICD-10-PCS | Performed by: RADIOLOGY

## 2020-01-01 PROCEDURE — 99152 MOD SED SAME PHYS/QHP 5/>YRS: CPT

## 2020-01-01 PROCEDURE — 6370000000 HC RX 637 (ALT 250 FOR IP): Performed by: PHYSICIAN ASSISTANT

## 2020-01-01 PROCEDURE — 51798 US URINE CAPACITY MEASURE: CPT

## 2020-01-01 PROCEDURE — 99285 EMERGENCY DEPT VISIT HI MDM: CPT

## 2020-01-01 PROCEDURE — 99214 OFFICE O/P EST MOD 30 MIN: CPT | Performed by: FAMILY MEDICINE

## 2020-01-01 PROCEDURE — 96374 THER/PROPH/DIAG INJ IV PUSH: CPT

## 2020-01-01 PROCEDURE — 83735 ASSAY OF MAGNESIUM: CPT

## 2020-01-01 PROCEDURE — 83970 ASSAY OF PARATHORMONE: CPT

## 2020-01-01 PROCEDURE — 93005 ELECTROCARDIOGRAM TRACING: CPT | Performed by: EMERGENCY MEDICINE

## 2020-01-01 PROCEDURE — 70450 CT HEAD/BRAIN W/O DYE: CPT

## 2020-01-01 PROCEDURE — 87088 URINE BACTERIA CULTURE: CPT

## 2020-01-01 PROCEDURE — 94761 N-INVAS EAR/PLS OXIMETRY MLT: CPT

## 2020-01-01 PROCEDURE — 92526 ORAL FUNCTION THERAPY: CPT

## 2020-01-01 PROCEDURE — 1090F PRES/ABSN URINE INCON ASSESS: CPT | Performed by: FAMILY MEDICINE

## 2020-01-01 PROCEDURE — 97116 GAIT TRAINING THERAPY: CPT

## 2020-01-01 PROCEDURE — 99223 1ST HOSP IP/OBS HIGH 75: CPT | Performed by: PSYCHIATRY & NEUROLOGY

## 2020-01-01 PROCEDURE — 95819 EEG AWAKE AND ASLEEP: CPT | Performed by: PSYCHIATRY & NEUROLOGY

## 2020-01-01 PROCEDURE — 84165 PROTEIN E-PHORESIS SERUM: CPT

## 2020-01-01 PROCEDURE — 22515 PERQ VERTEBRAL AUGMENTATION: CPT

## 2020-01-01 PROCEDURE — 1036F TOBACCO NON-USER: CPT | Performed by: INTERNAL MEDICINE

## 2020-01-01 PROCEDURE — 99214 OFFICE O/P EST MOD 30 MIN: CPT | Performed by: INTERNAL MEDICINE

## 2020-01-01 PROCEDURE — G8420 CALC BMI NORM PARAMETERS: HCPCS | Performed by: INTERNAL MEDICINE

## 2020-01-01 PROCEDURE — 80069 RENAL FUNCTION PANEL: CPT

## 2020-01-01 PROCEDURE — 92523 SPEECH SOUND LANG COMPREHEN: CPT

## 2020-01-01 PROCEDURE — 83880 ASSAY OF NATRIURETIC PEPTIDE: CPT

## 2020-01-01 PROCEDURE — 96361 HYDRATE IV INFUSION ADD-ON: CPT

## 2020-01-01 PROCEDURE — 99284 EMERGENCY DEPT VISIT MOD MDM: CPT

## 2020-01-01 PROCEDURE — 99153 MOD SED SAME PHYS/QHP EA: CPT

## 2020-01-01 PROCEDURE — 81003 URINALYSIS AUTO W/O SCOPE: CPT

## 2020-01-01 PROCEDURE — 1036F TOBACCO NON-USER: CPT | Performed by: FAMILY MEDICINE

## 2020-01-01 PROCEDURE — 51701 INSERT BLADDER CATHETER: CPT

## 2020-01-01 PROCEDURE — 96375 TX/PRO/DX INJ NEW DRUG ADDON: CPT

## 2020-01-01 PROCEDURE — 6360000002 HC RX W HCPCS: Performed by: HOSPITALIST

## 2020-01-01 PROCEDURE — 0PS43ZZ REPOSITION THORACIC VERTEBRA, PERCUTANEOUS APPROACH: ICD-10-PCS | Performed by: RADIOLOGY

## 2020-01-01 PROCEDURE — 1123F ACP DISCUSS/DSCN MKR DOCD: CPT | Performed by: INTERNAL MEDICINE

## 2020-01-01 PROCEDURE — G8427 DOCREV CUR MEDS BY ELIG CLIN: HCPCS | Performed by: FAMILY MEDICINE

## 2020-01-01 PROCEDURE — 4040F PNEUMOC VAC/ADMIN/RCVD: CPT | Performed by: FAMILY MEDICINE

## 2020-01-01 PROCEDURE — 1123F ACP DISCUSS/DSCN MKR DOCD: CPT | Performed by: FAMILY MEDICINE

## 2020-01-01 PROCEDURE — 72148 MRI LUMBAR SPINE W/O DYE: CPT

## 2020-01-01 PROCEDURE — 92610 EVALUATE SWALLOWING FUNCTION: CPT

## 2020-01-01 PROCEDURE — 84155 ASSAY OF PROTEIN SERUM: CPT

## 2020-01-01 PROCEDURE — G0480 DRUG TEST DEF 1-7 CLASSES: HCPCS

## 2020-01-01 PROCEDURE — 22513 PERQ VERTEBRAL AUGMENTATION: CPT

## 2020-01-01 RX ORDER — ACETAMINOPHEN 500 MG
1000 TABLET ORAL EVERY 8 HOURS SCHEDULED
Status: DISCONTINUED | OUTPATIENT
Start: 2020-01-01 | End: 2020-01-01 | Stop reason: HOSPADM

## 2020-01-01 RX ORDER — ACETAMINOPHEN 650 MG/1
650 SUPPOSITORY RECTAL EVERY 6 HOURS PRN
Status: DISCONTINUED | OUTPATIENT
Start: 2020-01-01 | End: 2020-01-01 | Stop reason: HOSPADM

## 2020-01-01 RX ORDER — TRAMADOL HYDROCHLORIDE 50 MG/1
50 TABLET ORAL EVERY 6 HOURS PRN
Status: DISCONTINUED | OUTPATIENT
Start: 2020-01-01 | End: 2020-01-01

## 2020-01-01 RX ORDER — POLYETHYLENE GLYCOL 3350 17 G/17G
17 POWDER, FOR SOLUTION ORAL DAILY PRN
Status: CANCELLED | OUTPATIENT
Start: 2020-01-01

## 2020-01-01 RX ORDER — METHOCARBAMOL 500 MG/1
250 TABLET, FILM COATED ORAL 3 TIMES DAILY
Status: DISCONTINUED | OUTPATIENT
Start: 2020-01-01 | End: 2020-01-01

## 2020-01-01 RX ORDER — DEXTROSE MONOHYDRATE 50 MG/ML
100 INJECTION, SOLUTION INTRAVENOUS PRN
Status: DISCONTINUED | OUTPATIENT
Start: 2020-01-01 | End: 2020-01-01 | Stop reason: HOSPADM

## 2020-01-01 RX ORDER — TRAMADOL HYDROCHLORIDE 50 MG/1
25 TABLET ORAL EVERY 6 HOURS PRN
Status: DISCONTINUED | OUTPATIENT
Start: 2020-01-01 | End: 2020-01-01 | Stop reason: HOSPADM

## 2020-01-01 RX ORDER — OXYCODONE HYDROCHLORIDE AND ACETAMINOPHEN 5; 325 MG/1; MG/1
1 TABLET ORAL ONCE
Status: COMPLETED | OUTPATIENT
Start: 2020-01-01 | End: 2020-01-01

## 2020-01-01 RX ORDER — METOPROLOL SUCCINATE 25 MG/1
25 TABLET, EXTENDED RELEASE ORAL DAILY
Status: DISCONTINUED | OUTPATIENT
Start: 2020-01-01 | End: 2020-01-01 | Stop reason: HOSPADM

## 2020-01-01 RX ORDER — ERGOCALCIFEROL 1.25 MG/1
50000 CAPSULE ORAL WEEKLY
Status: DISCONTINUED | OUTPATIENT
Start: 2020-01-01 | End: 2020-01-01 | Stop reason: HOSPADM

## 2020-01-01 RX ORDER — DEXTROSE MONOHYDRATE 25 G/50ML
12.5 INJECTION, SOLUTION INTRAVENOUS PRN
Status: DISCONTINUED | OUTPATIENT
Start: 2020-01-01 | End: 2020-01-01 | Stop reason: HOSPADM

## 2020-01-01 RX ORDER — MAGNESIUM SULFATE 1 G/100ML
1 INJECTION INTRAVENOUS ONCE
Status: COMPLETED | OUTPATIENT
Start: 2020-01-01 | End: 2020-01-01

## 2020-01-01 RX ORDER — METHYLPREDNISOLONE 4 MG/1
TABLET ORAL
Qty: 21 TABLET | Refills: 0 | Status: ON HOLD | OUTPATIENT
Start: 2020-01-01 | End: 2020-01-01

## 2020-01-01 RX ORDER — HEPARIN SODIUM 5000 [USP'U]/ML
5000 INJECTION, SOLUTION INTRAVENOUS; SUBCUTANEOUS 2 TIMES DAILY
Status: DISCONTINUED | OUTPATIENT
Start: 2020-01-01 | End: 2020-01-01 | Stop reason: HOSPADM

## 2020-01-01 RX ORDER — ASPIRIN 81 MG/1
TABLET, CHEWABLE ORAL
Qty: 30 TABLET | Refills: 0 | Status: SHIPPED | OUTPATIENT
Start: 2020-01-01 | End: 2020-01-01

## 2020-01-01 RX ORDER — 0.9 % SODIUM CHLORIDE 0.9 %
500 INTRAVENOUS SOLUTION INTRAVENOUS ONCE
Status: COMPLETED | OUTPATIENT
Start: 2020-01-01 | End: 2020-01-01

## 2020-01-01 RX ORDER — SODIUM CHLORIDE 0.9 % (FLUSH) 0.9 %
10 SYRINGE (ML) INJECTION EVERY 12 HOURS SCHEDULED
Status: DISCONTINUED | OUTPATIENT
Start: 2020-01-01 | End: 2020-01-01 | Stop reason: HOSPADM

## 2020-01-01 RX ORDER — ACETAMINOPHEN 325 MG/1
650 TABLET ORAL EVERY 6 HOURS PRN
Status: CANCELLED | OUTPATIENT
Start: 2020-01-01

## 2020-01-01 RX ORDER — LORAZEPAM 2 MG/ML
1 INJECTION INTRAMUSCULAR
Status: DISCONTINUED | OUTPATIENT
Start: 2020-01-01 | End: 2020-01-01 | Stop reason: HOSPADM

## 2020-01-01 RX ORDER — POTASSIUM CHLORIDE 7.45 MG/ML
10 INJECTION INTRAVENOUS PRN
Status: DISCONTINUED | OUTPATIENT
Start: 2020-01-01 | End: 2020-01-01 | Stop reason: HOSPADM

## 2020-01-01 RX ORDER — MIDAZOLAM HYDROCHLORIDE 1 MG/ML
INJECTION INTRAMUSCULAR; INTRAVENOUS DAILY PRN
Status: COMPLETED | OUTPATIENT
Start: 2020-01-01 | End: 2020-01-01

## 2020-01-01 RX ORDER — LEVETIRACETAM 100 MG/ML
500 SOLUTION ORAL 2 TIMES DAILY
Status: DISCONTINUED | OUTPATIENT
Start: 2020-01-01 | End: 2020-01-01 | Stop reason: HOSPADM

## 2020-01-01 RX ORDER — METHOCARBAMOL 500 MG/1
250 TABLET, FILM COATED ORAL 2 TIMES DAILY
Qty: 7 TABLET | Refills: 0
Start: 2020-01-01 | End: 2020-01-01

## 2020-01-01 RX ORDER — POLYETHYLENE GLYCOL 3350 17 G/17G
17 POWDER, FOR SOLUTION ORAL DAILY PRN
Status: DISCONTINUED | OUTPATIENT
Start: 2020-01-01 | End: 2020-01-01 | Stop reason: HOSPADM

## 2020-01-01 RX ORDER — 0.9 % SODIUM CHLORIDE 0.9 %
1000 INTRAVENOUS SOLUTION INTRAVENOUS ONCE
Status: COMPLETED | OUTPATIENT
Start: 2020-01-01 | End: 2020-01-01

## 2020-01-01 RX ORDER — AMLODIPINE BESYLATE 5 MG/1
1 TABLET ORAL DAILY
Status: CANCELLED | OUTPATIENT
Start: 2020-01-01

## 2020-01-01 RX ORDER — ACETAMINOPHEN 500 MG
1000 TABLET ORAL EVERY 8 HOURS SCHEDULED
Status: CANCELLED | OUTPATIENT
Start: 2020-01-01

## 2020-01-01 RX ORDER — TRAMADOL HYDROCHLORIDE 50 MG/1
25 TABLET ORAL EVERY 8 HOURS PRN
Qty: 6 TABLET | Refills: 0
Start: 2020-01-01 | End: 2020-01-01

## 2020-01-01 RX ORDER — DEXAMETHASONE SODIUM PHOSPHATE 10 MG/ML
10 INJECTION INTRAMUSCULAR; INTRAVENOUS ONCE
Status: COMPLETED | OUTPATIENT
Start: 2020-01-01 | End: 2020-01-01

## 2020-01-01 RX ORDER — METOPROLOL SUCCINATE 25 MG/1
1 TABLET, EXTENDED RELEASE ORAL DAILY
Status: CANCELLED | OUTPATIENT
Start: 2020-01-01

## 2020-01-01 RX ORDER — METOPROLOL SUCCINATE 25 MG/1
25 TABLET, EXTENDED RELEASE ORAL DAILY
Qty: 90 TABLET | Refills: 1 | Status: SHIPPED | OUTPATIENT
Start: 2020-01-01

## 2020-01-01 RX ORDER — DEXAMETHASONE SODIUM PHOSPHATE 4 MG/ML
4 INJECTION, SOLUTION INTRA-ARTICULAR; INTRALESIONAL; INTRAMUSCULAR; INTRAVENOUS; SOFT TISSUE EVERY 6 HOURS
Status: DISCONTINUED | OUTPATIENT
Start: 2020-01-01 | End: 2020-01-01

## 2020-01-01 RX ORDER — CEFAZOLIN SODIUM 1 G/3ML
1 INJECTION, POWDER, FOR SOLUTION INTRAMUSCULAR; INTRAVENOUS
Status: DISCONTINUED | OUTPATIENT
Start: 2020-01-01 | End: 2020-01-01

## 2020-01-01 RX ORDER — POLYETHYLENE GLYCOL 3350 17 G/17G
17 POWDER, FOR SOLUTION ORAL DAILY PRN
Qty: 527 G | Refills: 1 | Status: SHIPPED | OUTPATIENT
Start: 2020-01-01 | End: 2020-09-18

## 2020-01-01 RX ORDER — CLOPIDOGREL BISULFATE 75 MG/1
75 TABLET ORAL DAILY
Status: DISCONTINUED | OUTPATIENT
Start: 2020-01-01 | End: 2020-01-01 | Stop reason: HOSPADM

## 2020-01-01 RX ORDER — ERGOCALCIFEROL 1.25 MG/1
50000 CAPSULE ORAL WEEKLY
Qty: 12 CAPSULE | Refills: 1 | Status: SHIPPED | OUTPATIENT
Start: 2020-01-01 | End: 2020-09-07

## 2020-01-01 RX ORDER — FUROSEMIDE 20 MG/1
20 TABLET ORAL DAILY PRN
Qty: 90 TABLET | Refills: 0 | Status: ON HOLD
Start: 2020-01-01 | End: 2020-01-01 | Stop reason: HOSPADM

## 2020-01-01 RX ORDER — ONDANSETRON 2 MG/ML
4 INJECTION INTRAMUSCULAR; INTRAVENOUS EVERY 4 HOURS PRN
Status: DISCONTINUED | OUTPATIENT
Start: 2020-01-01 | End: 2020-01-01 | Stop reason: HOSPADM

## 2020-01-01 RX ORDER — LISINOPRIL 40 MG/1
40 TABLET ORAL DAILY
Status: DISCONTINUED | OUTPATIENT
Start: 2020-01-01 | End: 2020-01-01

## 2020-01-01 RX ORDER — ASPIRIN 81 MG/1
81 TABLET, CHEWABLE ORAL DAILY
Status: CANCELLED | OUTPATIENT
Start: 2020-01-01

## 2020-01-01 RX ORDER — IBUPROFEN 400 MG/1
800 TABLET ORAL ONCE
Status: COMPLETED | OUTPATIENT
Start: 2020-01-01 | End: 2020-01-01

## 2020-01-01 RX ORDER — ISOSORBIDE MONONITRATE 30 MG/1
30 TABLET, EXTENDED RELEASE ORAL DAILY
Qty: 90 TABLET | Refills: 3 | Status: SHIPPED | OUTPATIENT
Start: 2020-01-01

## 2020-01-01 RX ORDER — ONDANSETRON 2 MG/ML
4 INJECTION INTRAMUSCULAR; INTRAVENOUS EVERY 6 HOURS PRN
Status: CANCELLED | OUTPATIENT
Start: 2020-01-01

## 2020-01-01 RX ORDER — POTASSIUM CHLORIDE 20 MEQ/1
20 TABLET, EXTENDED RELEASE ORAL DAILY PRN
Qty: 90 TABLET | Refills: 3
Start: 2020-01-01

## 2020-01-01 RX ORDER — SODIUM CHLORIDE 0.9 % (FLUSH) 0.9 %
10 SYRINGE (ML) INJECTION EVERY 12 HOURS SCHEDULED
Status: CANCELLED | OUTPATIENT
Start: 2020-01-01

## 2020-01-01 RX ORDER — PROMETHAZINE HYDROCHLORIDE 25 MG/1
12.5 TABLET ORAL EVERY 6 HOURS PRN
Status: DISCONTINUED | OUTPATIENT
Start: 2020-01-01 | End: 2020-01-01 | Stop reason: HOSPADM

## 2020-01-01 RX ORDER — METHYLPREDNISOLONE 4 MG/1
TABLET ORAL
Qty: 21 TABLET | Refills: 0 | Status: SHIPPED | OUTPATIENT
Start: 2020-01-01 | End: 2020-01-01

## 2020-01-01 RX ORDER — ASPIRIN 81 MG/1
81 TABLET, CHEWABLE ORAL DAILY
Status: DISCONTINUED | OUTPATIENT
Start: 2020-01-01 | End: 2020-01-01

## 2020-01-01 RX ORDER — SODIUM CHLORIDE 9 MG/ML
INJECTION, SOLUTION INTRAVENOUS CONTINUOUS
Status: DISCONTINUED | OUTPATIENT
Start: 2020-01-01 | End: 2020-01-01

## 2020-01-01 RX ORDER — METOPROLOL SUCCINATE 25 MG/1
25 TABLET, EXTENDED RELEASE ORAL DAILY
Qty: 30 TABLET | Refills: 0
Start: 2020-01-01 | End: 2020-01-01

## 2020-01-01 RX ORDER — NICOTINE POLACRILEX 4 MG
15 LOZENGE BUCCAL PRN
Status: DISCONTINUED | OUTPATIENT
Start: 2020-01-01 | End: 2020-01-01 | Stop reason: HOSPADM

## 2020-01-01 RX ORDER — LIDOCAINE 4 G/G
1 PATCH TOPICAL DAILY
Status: DISCONTINUED | OUTPATIENT
Start: 2020-01-01 | End: 2020-01-01 | Stop reason: HOSPADM

## 2020-01-01 RX ORDER — ISOSORBIDE MONONITRATE 30 MG/1
30 TABLET, EXTENDED RELEASE ORAL DAILY
Status: DISCONTINUED | OUTPATIENT
Start: 2020-01-01 | End: 2020-01-01 | Stop reason: HOSPADM

## 2020-01-01 RX ORDER — KETOROLAC TROMETHAMINE 30 MG/ML
30 INJECTION, SOLUTION INTRAMUSCULAR; INTRAVENOUS ONCE
Status: COMPLETED | OUTPATIENT
Start: 2020-01-01 | End: 2020-01-01

## 2020-01-01 RX ORDER — FUROSEMIDE 20 MG/1
20 TABLET ORAL DAILY PRN
Status: DISCONTINUED | OUTPATIENT
Start: 2020-01-01 | End: 2020-01-01

## 2020-01-01 RX ORDER — FENTANYL CITRATE 50 UG/ML
INJECTION, SOLUTION INTRAMUSCULAR; INTRAVENOUS DAILY PRN
Status: COMPLETED | OUTPATIENT
Start: 2020-01-01 | End: 2020-01-01

## 2020-01-01 RX ORDER — 0.9 % SODIUM CHLORIDE 0.9 %
1000 INTRAVENOUS SOLUTION INTRAVENOUS ONCE
Status: DISCONTINUED | OUTPATIENT
Start: 2020-01-01 | End: 2020-01-01 | Stop reason: HOSPADM

## 2020-01-01 RX ORDER — ERGOCALCIFEROL 1.25 MG/1
50000 CAPSULE ORAL WEEKLY
Qty: 10 CAPSULE | Refills: 0
Start: 2020-01-01 | End: 2020-01-01 | Stop reason: SDUPTHER

## 2020-01-01 RX ORDER — OXYCODONE HYDROCHLORIDE AND ACETAMINOPHEN 5; 325 MG/1; MG/1
1 TABLET ORAL EVERY 6 HOURS PRN
Qty: 10 TABLET | Refills: 0 | Status: ON HOLD | OUTPATIENT
Start: 2020-01-01 | End: 2020-01-01 | Stop reason: HOSPADM

## 2020-01-01 RX ORDER — LISINOPRIL 40 MG/1
40 TABLET ORAL DAILY
Qty: 90 TABLET | Refills: 3 | Status: ON HOLD | OUTPATIENT
Start: 2020-01-01 | End: 2020-01-01 | Stop reason: SDUPTHER

## 2020-01-01 RX ORDER — ISOSORBIDE MONONITRATE 30 MG/1
30 TABLET, EXTENDED RELEASE ORAL DAILY
Status: DISCONTINUED | OUTPATIENT
Start: 2020-01-01 | End: 2020-01-01

## 2020-01-01 RX ORDER — ISOSORBIDE MONONITRATE 60 MG/1
60 TABLET, EXTENDED RELEASE ORAL DAILY
Status: DISCONTINUED | OUTPATIENT
Start: 2020-01-01 | End: 2020-01-01 | Stop reason: HOSPADM

## 2020-01-01 RX ORDER — METHOCARBAMOL 500 MG/1
250 TABLET, FILM COATED ORAL 2 TIMES DAILY
Status: DISCONTINUED | OUTPATIENT
Start: 2020-01-01 | End: 2020-01-01

## 2020-01-01 RX ORDER — SODIUM CHLORIDE 0.9 % (FLUSH) 0.9 %
10 SYRINGE (ML) INJECTION PRN
Status: CANCELLED | OUTPATIENT
Start: 2020-01-01

## 2020-01-01 RX ORDER — METOPROLOL SUCCINATE 25 MG/1
1 TABLET, EXTENDED RELEASE ORAL DAILY
Status: DISCONTINUED | OUTPATIENT
Start: 2020-01-01 | End: 2020-01-01

## 2020-01-01 RX ORDER — AMLODIPINE BESYLATE 5 MG/1
10 TABLET ORAL DAILY
Status: DISCONTINUED | OUTPATIENT
Start: 2020-01-01 | End: 2020-01-01 | Stop reason: HOSPADM

## 2020-01-01 RX ORDER — DEXTROSE MONOHYDRATE 25 G/50ML
25 INJECTION, SOLUTION INTRAVENOUS ONCE
Status: COMPLETED | OUTPATIENT
Start: 2020-01-01 | End: 2020-01-01

## 2020-01-01 RX ORDER — ACETAMINOPHEN 325 MG/1
650 TABLET ORAL EVERY 6 HOURS PRN
Status: DISCONTINUED | OUTPATIENT
Start: 2020-01-01 | End: 2020-01-01 | Stop reason: HOSPADM

## 2020-01-01 RX ORDER — FENTANYL CITRATE 50 UG/ML
50 INJECTION, SOLUTION INTRAMUSCULAR; INTRAVENOUS ONCE
Status: COMPLETED | OUTPATIENT
Start: 2020-01-01 | End: 2020-01-01

## 2020-01-01 RX ORDER — LEVETIRACETAM 10 MG/ML
1000 INJECTION INTRAVASCULAR ONCE
Status: COMPLETED | OUTPATIENT
Start: 2020-01-01 | End: 2020-01-01

## 2020-01-01 RX ORDER — ACETAMINOPHEN 500 MG
1000 TABLET ORAL EVERY 8 HOURS SCHEDULED
Qty: 120 TABLET | Refills: 3
Start: 2020-01-01 | End: 2020-01-01

## 2020-01-01 RX ORDER — ISOSORBIDE MONONITRATE 30 MG/1
1 TABLET, EXTENDED RELEASE ORAL DAILY
Status: CANCELLED | OUTPATIENT
Start: 2020-01-01

## 2020-01-01 RX ORDER — SODIUM CHLORIDE 0.9 % (FLUSH) 0.9 %
10 SYRINGE (ML) INJECTION PRN
Status: DISCONTINUED | OUTPATIENT
Start: 2020-01-01 | End: 2020-01-01 | Stop reason: HOSPADM

## 2020-01-01 RX ORDER — AMLODIPINE BESYLATE 5 MG/1
5 TABLET ORAL DAILY
Qty: 30 TABLET | Refills: 3
Start: 2020-01-01 | End: 2020-01-01

## 2020-01-01 RX ORDER — METHOCARBAMOL 500 MG/1
500 TABLET, FILM COATED ORAL 2 TIMES DAILY
Qty: 20 TABLET | Refills: 0 | Status: ON HOLD | OUTPATIENT
Start: 2020-01-01 | End: 2020-01-01 | Stop reason: SDUPTHER

## 2020-01-01 RX ORDER — ONDANSETRON 2 MG/ML
4 INJECTION INTRAMUSCULAR; INTRAVENOUS EVERY 6 HOURS PRN
Status: DISCONTINUED | OUTPATIENT
Start: 2020-01-01 | End: 2020-01-01 | Stop reason: HOSPADM

## 2020-01-01 RX ORDER — CLOPIDOGREL BISULFATE 75 MG/1
1 TABLET ORAL DAILY
Status: DISCONTINUED | OUTPATIENT
Start: 2020-01-01 | End: 2020-01-01

## 2020-01-01 RX ORDER — AMLODIPINE BESYLATE 5 MG/1
TABLET ORAL
Qty: 90 TABLET | Refills: 1 | Status: SHIPPED | OUTPATIENT
Start: 2020-01-01

## 2020-01-01 RX ORDER — TRAMADOL HYDROCHLORIDE 50 MG/1
25 TABLET ORAL EVERY 8 HOURS PRN
Qty: 6 TABLET | Refills: 0 | Status: SHIPPED | OUTPATIENT
Start: 2020-01-01 | End: 2020-01-01

## 2020-01-01 RX ORDER — ACETAMINOPHEN 325 MG/1
650 TABLET ORAL EVERY 4 HOURS PRN
Status: DISCONTINUED | OUTPATIENT
Start: 2020-01-01 | End: 2020-01-01

## 2020-01-01 RX ORDER — ACETAMINOPHEN 650 MG/1
650 SUPPOSITORY RECTAL EVERY 6 HOURS PRN
Status: CANCELLED | OUTPATIENT
Start: 2020-01-01

## 2020-01-01 RX ORDER — ASPIRIN 81 MG/1
81 TABLET, CHEWABLE ORAL DAILY
Status: DISCONTINUED | OUTPATIENT
Start: 2020-01-01 | End: 2020-01-01 | Stop reason: HOSPADM

## 2020-01-01 RX ORDER — PROMETHAZINE HYDROCHLORIDE 25 MG/1
12.5 TABLET ORAL EVERY 6 HOURS PRN
Status: CANCELLED | OUTPATIENT
Start: 2020-01-01

## 2020-01-01 RX ORDER — AMLODIPINE BESYLATE 5 MG/1
5 TABLET ORAL DAILY
Status: DISCONTINUED | OUTPATIENT
Start: 2020-01-01 | End: 2020-01-01

## 2020-01-01 RX ORDER — FAMOTIDINE 20 MG/1
20 TABLET, FILM COATED ORAL DAILY
Status: DISCONTINUED | OUTPATIENT
Start: 2020-01-01 | End: 2020-01-01 | Stop reason: HOSPADM

## 2020-01-01 RX ORDER — FAMOTIDINE 20 MG/1
20 TABLET, FILM COATED ORAL 2 TIMES DAILY
Status: DISCONTINUED | OUTPATIENT
Start: 2020-01-01 | End: 2020-01-01 | Stop reason: DRUGHIGH

## 2020-01-01 RX ORDER — LIDOCAINE 4 G/G
1 PATCH TOPICAL DAILY
Qty: 30 PATCH | Refills: 0
Start: 2020-01-01 | End: 2020-01-01

## 2020-01-01 RX ORDER — AMLODIPINE BESYLATE 5 MG/1
5 TABLET ORAL DAILY
Status: DISCONTINUED | OUTPATIENT
Start: 2020-01-01 | End: 2020-01-01 | Stop reason: HOSPADM

## 2020-01-01 RX ORDER — SODIUM CHLORIDE 9 MG/ML
INJECTION, SOLUTION INTRAVENOUS CONTINUOUS
Status: DISCONTINUED | OUTPATIENT
Start: 2020-01-01 | End: 2020-01-01 | Stop reason: HOSPADM

## 2020-01-01 RX ORDER — FUROSEMIDE 20 MG/1
20 TABLET ORAL DAILY
Qty: 90 TABLET | Refills: 0 | Status: ON HOLD | OUTPATIENT
Start: 2020-01-01 | End: 2020-01-01

## 2020-01-01 RX ORDER — OXYCODONE HYDROCHLORIDE AND ACETAMINOPHEN 5; 325 MG/1; MG/1
1 TABLET ORAL EVERY 6 HOURS PRN
Status: DISCONTINUED | OUTPATIENT
Start: 2020-01-01 | End: 2020-01-01

## 2020-01-01 RX ORDER — METHOCARBAMOL 500 MG/1
500 TABLET, FILM COATED ORAL 2 TIMES DAILY
Status: DISCONTINUED | OUTPATIENT
Start: 2020-01-01 | End: 2020-01-01

## 2020-01-01 RX ADMIN — IBUPROFEN 800 MG: 400 TABLET, FILM COATED ORAL at 15:34

## 2020-01-01 RX ADMIN — SODIUM CHLORIDE: 9 INJECTION, SOLUTION INTRAVENOUS at 02:45

## 2020-01-01 RX ADMIN — SODIUM CHLORIDE: 9 INJECTION, SOLUTION INTRAVENOUS at 23:00

## 2020-01-01 RX ADMIN — INSULIN HUMAN 10 UNITS: 100 INJECTION, SOLUTION PARENTERAL at 23:20

## 2020-01-01 RX ADMIN — AMLODIPINE BESYLATE 5 MG: 5 TABLET ORAL at 16:10

## 2020-01-01 RX ADMIN — METOPROLOL SUCCINATE 25 MG: 25 TABLET, EXTENDED RELEASE ORAL at 14:39

## 2020-01-01 RX ADMIN — METHOCARBAMOL TABLETS 500 MG: 500 TABLET, COATED ORAL at 20:32

## 2020-01-01 RX ADMIN — CLOPIDOGREL BISULFATE 75 MG: 75 TABLET ORAL at 09:14

## 2020-01-01 RX ADMIN — ISOSORBIDE MONONITRATE 30 MG: 30 TABLET, EXTENDED RELEASE ORAL at 09:14

## 2020-01-01 RX ADMIN — ACETAMINOPHEN 1000 MG: 500 TABLET ORAL at 13:18

## 2020-01-01 RX ADMIN — SODIUM CHLORIDE, PRESERVATIVE FREE 10 ML: 5 INJECTION INTRAVENOUS at 12:34

## 2020-01-01 RX ADMIN — HEPARIN SODIUM 5000 UNITS: 5000 INJECTION INTRAVENOUS; SUBCUTANEOUS at 09:14

## 2020-01-01 RX ADMIN — LEVETIRACETAM 500 MG: 500 SOLUTION ORAL at 10:51

## 2020-01-01 RX ADMIN — HEPARIN SODIUM 5000 UNITS: 5000 INJECTION INTRAVENOUS; SUBCUTANEOUS at 09:27

## 2020-01-01 RX ADMIN — ISOSORBIDE MONONITRATE 30 MG: 30 TABLET, EXTENDED RELEASE ORAL at 08:49

## 2020-01-01 RX ADMIN — SODIUM CHLORIDE, PRESERVATIVE FREE 10 ML: 5 INJECTION INTRAVENOUS at 08:52

## 2020-01-01 RX ADMIN — ACETAMINOPHEN 1000 MG: 500 TABLET ORAL at 16:42

## 2020-01-01 RX ADMIN — LISINOPRIL 40 MG: 40 TABLET ORAL at 09:21

## 2020-01-01 RX ADMIN — SODIUM CHLORIDE, PRESERVATIVE FREE 10 ML: 5 INJECTION INTRAVENOUS at 22:36

## 2020-01-01 RX ADMIN — DEXAMETHASONE SODIUM PHOSPHATE 4 MG: 4 INJECTION, SOLUTION INTRAMUSCULAR; INTRAVENOUS at 20:26

## 2020-01-01 RX ADMIN — HEPARIN SODIUM 5000 UNITS: 5000 INJECTION INTRAVENOUS; SUBCUTANEOUS at 08:36

## 2020-01-01 RX ADMIN — FENTANYL CITRATE 25 MCG: 50 INJECTION INTRAMUSCULAR; INTRAVENOUS at 11:48

## 2020-01-01 RX ADMIN — SODIUM CHLORIDE 1000 ML: 9 INJECTION, SOLUTION INTRAVENOUS at 20:26

## 2020-01-01 RX ADMIN — HEPARIN SODIUM 5000 UNITS: 5000 INJECTION INTRAVENOUS; SUBCUTANEOUS at 22:44

## 2020-01-01 RX ADMIN — Medication 10 ML: at 19:30

## 2020-01-01 RX ADMIN — HEPARIN SODIUM 5000 UNITS: 5000 INJECTION INTRAVENOUS; SUBCUTANEOUS at 22:36

## 2020-01-01 RX ADMIN — FAMOTIDINE 20 MG: 20 TABLET, FILM COATED ORAL at 08:49

## 2020-01-01 RX ADMIN — SODIUM CHLORIDE 1000 ML: 9 INJECTION, SOLUTION INTRAVENOUS at 23:21

## 2020-01-01 RX ADMIN — FAMOTIDINE 20 MG: 20 TABLET, FILM COATED ORAL at 09:27

## 2020-01-01 RX ADMIN — SODIUM CHLORIDE, PRESERVATIVE FREE 10 ML: 5 INJECTION INTRAVENOUS at 22:42

## 2020-01-01 RX ADMIN — ACETAMINOPHEN 1000 MG: 500 TABLET ORAL at 08:34

## 2020-01-01 RX ADMIN — ENOXAPARIN SODIUM 30 MG: 30 INJECTION SUBCUTANEOUS at 09:42

## 2020-01-01 RX ADMIN — TRAMADOL HYDROCHLORIDE 50 MG: 50 TABLET ORAL at 22:36

## 2020-01-01 RX ADMIN — ISOSORBIDE MONONITRATE 60 MG: 60 TABLET, EXTENDED RELEASE ORAL at 10:52

## 2020-01-01 RX ADMIN — AMLODIPINE BESYLATE 10 MG: 5 TABLET ORAL at 09:14

## 2020-01-01 RX ADMIN — FENTANYL CITRATE 25 MCG: 50 INJECTION INTRAMUSCULAR; INTRAVENOUS at 12:05

## 2020-01-01 RX ADMIN — AMLODIPINE BESYLATE 5 MG: 5 TABLET ORAL at 09:14

## 2020-01-01 RX ADMIN — ASPIRIN 81 MG 81 MG: 81 TABLET ORAL at 09:27

## 2020-01-01 RX ADMIN — POTASSIUM CHLORIDE 10 MEQ: 7.46 INJECTION, SOLUTION INTRAVENOUS at 12:18

## 2020-01-01 RX ADMIN — SODIUM CHLORIDE, PRESERVATIVE FREE 10 ML: 5 INJECTION INTRAVENOUS at 08:35

## 2020-01-01 RX ADMIN — SODIUM CHLORIDE, PRESERVATIVE FREE 10 ML: 5 INJECTION INTRAVENOUS at 09:14

## 2020-01-01 RX ADMIN — ONDANSETRON 4 MG: 2 INJECTION INTRAMUSCULAR; INTRAVENOUS at 12:13

## 2020-01-01 RX ADMIN — Medication 1 G: at 19:44

## 2020-01-01 RX ADMIN — Medication 10 ML: at 00:00

## 2020-01-01 RX ADMIN — ISOSORBIDE MONONITRATE 30 MG: 30 TABLET, EXTENDED RELEASE ORAL at 15:48

## 2020-01-01 RX ADMIN — ISOSORBIDE MONONITRATE 60 MG: 60 TABLET, EXTENDED RELEASE ORAL at 09:14

## 2020-01-01 RX ADMIN — SODIUM CHLORIDE: 9 INJECTION, SOLUTION INTRAVENOUS at 12:18

## 2020-01-01 RX ADMIN — CLOPIDOGREL BISULFATE 75 MG: 75 TABLET ORAL at 14:39

## 2020-01-01 RX ADMIN — SODIUM CHLORIDE, PRESERVATIVE FREE 10 ML: 5 INJECTION INTRAVENOUS at 09:30

## 2020-01-01 RX ADMIN — LEVETIRACETAM 500 MG: 500 SOLUTION ORAL at 09:20

## 2020-01-01 RX ADMIN — METHOCARBAMOL TABLETS 500 MG: 500 TABLET, COATED ORAL at 09:21

## 2020-01-01 RX ADMIN — DEXAMETHASONE SODIUM PHOSPHATE 10 MG: 10 INJECTION INTRAMUSCULAR; INTRAVENOUS at 16:42

## 2020-01-01 RX ADMIN — ACETAMINOPHEN 1000 MG: 500 TABLET ORAL at 21:35

## 2020-01-01 RX ADMIN — METHOCARBAMOL TABLETS 500 MG: 500 TABLET, COATED ORAL at 09:26

## 2020-01-01 RX ADMIN — Medication 50 MEQ: at 23:20

## 2020-01-01 RX ADMIN — SODIUM CHLORIDE: 9 INJECTION, SOLUTION INTRAVENOUS at 06:03

## 2020-01-01 RX ADMIN — LISINOPRIL 40 MG: 40 TABLET ORAL at 08:35

## 2020-01-01 RX ADMIN — FENTANYL CITRATE 25 MCG: 50 INJECTION INTRAMUSCULAR; INTRAVENOUS at 11:57

## 2020-01-01 RX ADMIN — ACETAMINOPHEN 1000 MG: 500 TABLET ORAL at 20:25

## 2020-01-01 RX ADMIN — TRAMADOL HYDROCHLORIDE 50 MG: 50 TABLET ORAL at 23:26

## 2020-01-01 RX ADMIN — OXYCODONE HYDROCHLORIDE AND ACETAMINOPHEN 1 TABLET: 5; 325 TABLET ORAL at 12:08

## 2020-01-01 RX ADMIN — ACETAMINOPHEN 1000 MG: 500 TABLET ORAL at 14:39

## 2020-01-01 RX ADMIN — SODIUM CHLORIDE 1000 ML: 9 INJECTION, SOLUTION INTRAVENOUS at 23:20

## 2020-01-01 RX ADMIN — LISINOPRIL 40 MG: 40 TABLET ORAL at 15:49

## 2020-01-01 RX ADMIN — SODIUM CHLORIDE, PRESERVATIVE FREE 10 ML: 5 INJECTION INTRAVENOUS at 09:29

## 2020-01-01 RX ADMIN — MAGNESIUM SULFATE HEPTAHYDRATE 1 G: 1 INJECTION, SOLUTION INTRAVENOUS at 09:41

## 2020-01-01 RX ADMIN — ASPIRIN 81 MG 81 MG: 81 TABLET ORAL at 08:35

## 2020-01-01 RX ADMIN — METOPROLOL SUCCINATE 25 MG: 25 TABLET, FILM COATED, EXTENDED RELEASE ORAL at 10:52

## 2020-01-01 RX ADMIN — LEVETIRACETAM 1000 MG: 10 INJECTION INTRAVENOUS at 00:57

## 2020-01-01 RX ADMIN — SODIUM CHLORIDE 1000 ML: 9 INJECTION, SOLUTION INTRAVENOUS at 17:00

## 2020-01-01 RX ADMIN — ACETAMINOPHEN 650 MG: 325 TABLET ORAL at 02:30

## 2020-01-01 RX ADMIN — FAMOTIDINE 20 MG: 20 TABLET, FILM COATED ORAL at 09:14

## 2020-01-01 RX ADMIN — METHOCARBAMOL TABLETS 250 MG: 500 TABLET, COATED ORAL at 20:25

## 2020-01-01 RX ADMIN — ACETAMINOPHEN 1000 MG: 500 TABLET ORAL at 22:42

## 2020-01-01 RX ADMIN — OXYCODONE HYDROCHLORIDE AND ACETAMINOPHEN 1 TABLET: 5; 325 TABLET ORAL at 15:34

## 2020-01-01 RX ADMIN — METOPROLOL SUCCINATE 25 MG: 25 TABLET, EXTENDED RELEASE ORAL at 09:14

## 2020-01-01 RX ADMIN — TRAMADOL HYDROCHLORIDE 50 MG: 50 TABLET ORAL at 05:59

## 2020-01-01 RX ADMIN — Medication 10 ML: at 22:07

## 2020-01-01 RX ADMIN — Medication 25 G: at 23:20

## 2020-01-01 RX ADMIN — HEPARIN SODIUM 5000 UNITS: 5000 INJECTION INTRAVENOUS; SUBCUTANEOUS at 20:32

## 2020-01-01 RX ADMIN — CEFAZOLIN 1 G: 1 INJECTION, POWDER, FOR SOLUTION INTRAMUSCULAR; INTRAVENOUS; PARENTERAL at 10:34

## 2020-01-01 RX ADMIN — FENTANYL CITRATE 50 MCG: 50 INJECTION INTRAMUSCULAR; INTRAVENOUS at 22:16

## 2020-01-01 RX ADMIN — ASPIRIN 81 MG 81 MG: 81 TABLET ORAL at 14:39

## 2020-01-01 RX ADMIN — FAMOTIDINE 20 MG: 20 TABLET, FILM COATED ORAL at 08:35

## 2020-01-01 RX ADMIN — ERGOCALCIFEROL 50000 UNITS: 1.25 CAPSULE ORAL at 20:29

## 2020-01-01 RX ADMIN — SODIUM CHLORIDE, PRESERVATIVE FREE 10 ML: 5 INJECTION INTRAVENOUS at 21:35

## 2020-01-01 RX ADMIN — MIDAZOLAM 0.5 MG: 1 INJECTION INTRAMUSCULAR; INTRAVENOUS at 11:45

## 2020-01-01 RX ADMIN — SODIUM CHLORIDE, PRESERVATIVE FREE 10 ML: 5 INJECTION INTRAVENOUS at 20:26

## 2020-01-01 RX ADMIN — LISINOPRIL 40 MG: 40 TABLET ORAL at 08:49

## 2020-01-01 RX ADMIN — SODIUM CHLORIDE: 9 INJECTION, SOLUTION INTRAVENOUS at 00:57

## 2020-01-01 RX ADMIN — Medication 1 G: at 22:13

## 2020-01-01 RX ADMIN — TRAMADOL HYDROCHLORIDE 50 MG: 50 TABLET ORAL at 15:49

## 2020-01-01 RX ADMIN — FAMOTIDINE 20 MG: 20 TABLET, FILM COATED ORAL at 15:49

## 2020-01-01 RX ADMIN — METOPROLOL SUCCINATE 25 MG: 25 TABLET, EXTENDED RELEASE ORAL at 08:35

## 2020-01-01 RX ADMIN — HEPARIN SODIUM 5000 UNITS: 5000 INJECTION INTRAVENOUS; SUBCUTANEOUS at 09:21

## 2020-01-01 RX ADMIN — METHOCARBAMOL TABLETS 250 MG: 500 TABLET, COATED ORAL at 08:49

## 2020-01-01 RX ADMIN — KETOROLAC TROMETHAMINE 30 MG: 30 INJECTION, SOLUTION INTRAMUSCULAR at 10:35

## 2020-01-01 RX ADMIN — DEXAMETHASONE SODIUM PHOSPHATE 4 MG: 4 INJECTION, SOLUTION INTRAMUSCULAR; INTRAVENOUS at 08:49

## 2020-01-01 RX ADMIN — TRAMADOL HYDROCHLORIDE 25 MG: 50 TABLET ORAL at 21:44

## 2020-01-01 RX ADMIN — MIDAZOLAM 0.5 MG: 1 INJECTION INTRAMUSCULAR; INTRAVENOUS at 12:00

## 2020-01-01 RX ADMIN — HEPARIN SODIUM 5000 UNITS: 5000 INJECTION INTRAVENOUS; SUBCUTANEOUS at 21:44

## 2020-01-01 RX ADMIN — Medication 1 G: at 22:20

## 2020-01-01 RX ADMIN — METOPROLOL SUCCINATE 25 MG: 25 TABLET, FILM COATED, EXTENDED RELEASE ORAL at 09:19

## 2020-01-01 RX ADMIN — SODIUM CHLORIDE 1000 ML: 9 INJECTION, SOLUTION INTRAVENOUS at 16:32

## 2020-01-01 RX ADMIN — METHOCARBAMOL TABLETS 500 MG: 500 TABLET, COATED ORAL at 22:36

## 2020-01-01 RX ADMIN — SODIUM CHLORIDE: 9 INJECTION, SOLUTION INTRAVENOUS at 14:33

## 2020-01-01 RX ADMIN — AMLODIPINE BESYLATE 10 MG: 5 TABLET ORAL at 10:51

## 2020-01-01 RX ADMIN — CLOPIDOGREL BISULFATE 75 MG: 75 TABLET ORAL at 08:35

## 2020-01-01 RX ADMIN — ACETAMINOPHEN 1000 MG: 500 TABLET ORAL at 05:54

## 2020-01-01 RX ADMIN — ISOSORBIDE MONONITRATE 30 MG: 30 TABLET, EXTENDED RELEASE ORAL at 08:35

## 2020-01-01 RX ADMIN — SODIUM CHLORIDE 500 ML: 9 INJECTION, SOLUTION INTRAVENOUS at 15:00

## 2020-01-01 RX ADMIN — ISOSORBIDE MONONITRATE 30 MG: 30 TABLET, EXTENDED RELEASE ORAL at 09:21

## 2020-01-01 RX ADMIN — ASPIRIN 81 MG 81 MG: 81 TABLET ORAL at 09:14

## 2020-01-01 RX ADMIN — Medication 10 ML: at 09:43

## 2020-01-01 RX ADMIN — ISOSORBIDE MONONITRATE 30 MG: 30 TABLET, EXTENDED RELEASE ORAL at 09:27

## 2020-01-01 RX ADMIN — FAMOTIDINE 20 MG: 20 TABLET, FILM COATED ORAL at 09:21

## 2020-01-01 RX ADMIN — SODIUM CHLORIDE: 9 INJECTION, SOLUTION INTRAVENOUS at 12:33

## 2020-01-01 RX ADMIN — MIDAZOLAM 0.5 MG: 1 INJECTION INTRAMUSCULAR; INTRAVENOUS at 12:05

## 2020-01-01 RX ADMIN — DEXAMETHASONE SODIUM PHOSPHATE 4 MG: 4 INJECTION, SOLUTION INTRAMUSCULAR; INTRAVENOUS at 05:04

## 2020-01-01 ASSESSMENT — PAIN DESCRIPTION - ONSET
ONSET: ON-GOING

## 2020-01-01 ASSESSMENT — PAIN DESCRIPTION - LOCATION
LOCATION: BACK

## 2020-01-01 ASSESSMENT — PAIN DESCRIPTION - PAIN TYPE
TYPE: ACUTE PAIN
TYPE: CHRONIC PAIN
TYPE: ACUTE PAIN;CHRONIC PAIN
TYPE: ACUTE PAIN
TYPE: ACUTE PAIN
TYPE: SURGICAL PAIN
TYPE: ACUTE PAIN
TYPE: ACUTE PAIN
TYPE: ACUTE PAIN;SURGICAL PAIN
TYPE: ACUTE PAIN

## 2020-01-01 ASSESSMENT — PAIN SCALES - GENERAL
PAINLEVEL_OUTOF10: 9
PAINLEVEL_OUTOF10: 0
PAINLEVEL_OUTOF10: 5
PAINLEVEL_OUTOF10: 0
PAINLEVEL_OUTOF10: 8
PAINLEVEL_OUTOF10: 0
PAINLEVEL_OUTOF10: 0
PAINLEVEL_OUTOF10: 6
PAINLEVEL_OUTOF10: 9
PAINLEVEL_OUTOF10: 3
PAINLEVEL_OUTOF10: 0
PAINLEVEL_OUTOF10: 8
PAINLEVEL_OUTOF10: 0
PAINLEVEL_OUTOF10: 2
PAINLEVEL_OUTOF10: 3
PAINLEVEL_OUTOF10: 4
PAINLEVEL_OUTOF10: 0
PAINLEVEL_OUTOF10: 8
PAINLEVEL_OUTOF10: 5
PAINLEVEL_OUTOF10: 0
PAINLEVEL_OUTOF10: 7
PAINLEVEL_OUTOF10: 7
PAINLEVEL_OUTOF10: 5
PAINLEVEL_OUTOF10: 5
PAINLEVEL_OUTOF10: 3
PAINLEVEL_OUTOF10: 7
PAINLEVEL_OUTOF10: 5
PAINLEVEL_OUTOF10: 0
PAINLEVEL_OUTOF10: 3
PAINLEVEL_OUTOF10: 5
PAINLEVEL_OUTOF10: 0
PAINLEVEL_OUTOF10: 0
PAINLEVEL_OUTOF10: 9
PAINLEVEL_OUTOF10: 7
PAINLEVEL_OUTOF10: 4

## 2020-01-01 ASSESSMENT — PAIN DESCRIPTION - ORIENTATION
ORIENTATION: LOWER;MID
ORIENTATION: LOWER
ORIENTATION: MID;LOWER
ORIENTATION: LEFT
ORIENTATION: MID;LOWER
ORIENTATION: LOWER
ORIENTATION: MID;LOWER
ORIENTATION: LOWER
ORIENTATION: MID;LOWER
ORIENTATION: LEFT
ORIENTATION: MID;LOWER
ORIENTATION: LOWER
ORIENTATION: MID;LOWER

## 2020-01-01 ASSESSMENT — PATIENT HEALTH QUESTIONNAIRE - PHQ9
1. LITTLE INTEREST OR PLEASURE IN DOING THINGS: 0
SUM OF ALL RESPONSES TO PHQ QUESTIONS 1-9: 0
SUM OF ALL RESPONSES TO PHQ9 QUESTIONS 1 & 2: 0
SUM OF ALL RESPONSES TO PHQ QUESTIONS 1-9: 0
2. FEELING DOWN, DEPRESSED OR HOPELESS: 0

## 2020-01-01 ASSESSMENT — ENCOUNTER SYMPTOMS
EYE PAIN: 0
SHORTNESS OF BREATH: 0
BACK PAIN: 1
APNEA: 0
SORE THROAT: 0
WHEEZING: 0
ABDOMINAL PAIN: 0
BACK PAIN: 1
EYE REDNESS: 0
SHORTNESS OF BREATH: 0
BACK PAIN: 1
EYE DISCHARGE: 0
CHOKING: 0
DIARRHEA: 0
CHEST TIGHTNESS: 0
FACIAL SWELLING: 0
COUGH: 0
COLOR CHANGE: 0
NAUSEA: 0
VOMITING: 0
VOMITING: 0
NAUSEA: 0
COUGH: 0
WHEEZING: 0
ABDOMINAL PAIN: 0
SHORTNESS OF BREATH: 0
ABDOMINAL PAIN: 0

## 2020-01-01 ASSESSMENT — PAIN DESCRIPTION - FREQUENCY
FREQUENCY: INTERMITTENT
FREQUENCY: CONTINUOUS
FREQUENCY: INTERMITTENT
FREQUENCY: INTERMITTENT
FREQUENCY: CONTINUOUS
FREQUENCY: CONTINUOUS

## 2020-01-01 ASSESSMENT — PAIN DESCRIPTION - DESCRIPTORS
DESCRIPTORS: ACHING
DESCRIPTORS: CONSTANT;DISCOMFORT
DESCRIPTORS: PATIENT UNABLE TO DESCRIBE
DESCRIPTORS: PATIENT UNABLE TO DESCRIBE
DESCRIPTORS: ACHING
DESCRIPTORS: PATIENT UNABLE TO DESCRIBE
DESCRIPTORS: CRUSHING;DISCOMFORT
DESCRIPTORS: ACHING
DESCRIPTORS: SORE;ACHING
DESCRIPTORS: PATIENT UNABLE TO DESCRIBE

## 2020-01-01 ASSESSMENT — PAIN - FUNCTIONAL ASSESSMENT
PAIN_FUNCTIONAL_ASSESSMENT: PREVENTS OR INTERFERES SOME ACTIVE ACTIVITIES AND ADLS
PAIN_FUNCTIONAL_ASSESSMENT: PREVENTS OR INTERFERES WITH MANY ACTIVE NOT PASSIVE ACTIVITIES
PAIN_FUNCTIONAL_ASSESSMENT: PREVENTS OR INTERFERES SOME ACTIVE ACTIVITIES AND ADLS

## 2020-01-01 ASSESSMENT — PAIN DESCRIPTION - PROGRESSION
CLINICAL_PROGRESSION: NOT CHANGED
CLINICAL_PROGRESSION: GRADUALLY IMPROVING
CLINICAL_PROGRESSION: GRADUALLY IMPROVING
CLINICAL_PROGRESSION: NOT CHANGED
CLINICAL_PROGRESSION: GRADUALLY IMPROVING
CLINICAL_PROGRESSION: NOT CHANGED
CLINICAL_PROGRESSION: NOT CHANGED
CLINICAL_PROGRESSION: GRADUALLY IMPROVING
CLINICAL_PROGRESSION: NOT CHANGED
CLINICAL_PROGRESSION: NOT CHANGED
CLINICAL_PROGRESSION: RESOLVED
CLINICAL_PROGRESSION: NOT CHANGED

## 2020-01-21 NOTE — PROGRESS NOTES
2020     Hermes Rivers (:  1930) is a 80 y.o. female, here for evaluation of the following medical concerns    Patient presented to the clinic due to several days of lower back pain that is intense at times, she denied dysuria, denied injury or trauma, patient stated that lower back is worse with movement, doesn't radiate down the legs, has been confined to a wheelchair, denied lose of bowel or bladder control. Denied saddle anesthesia    Today, denied chest pain, sob, n, v, or diarrhea. HPI    Review of Systems   Constitutional: Negative for activity change, fatigue, fever and unexpected weight change. Respiratory: Negative for cough, chest tightness, shortness of breath and wheezing. Cardiovascular: Negative for chest pain and leg swelling. Gastrointestinal: Negative for abdominal pain, diarrhea, nausea and vomiting. Musculoskeletal: Positive for arthralgias, back pain and gait problem. Skin: Negative for color change and rash. Neurological: Negative for dizziness, tremors, syncope, numbness and headaches. Prior to Visit Medications    Medication Sig Taking? Authorizing Provider   methylPREDNISolone (MEDROL, RAUL,) 4 MG tablet Take as directed.  Yes Roberto Nichole DO   aspirin 81 MG chewable tablet CHEW AND SWALLOW 1 TABLET BY MOUTH DAILY Yes Beryle Holes, MD   furosemide (LASIX) 40 MG tablet Take 1 tablet by mouth daily Yes Beryle Holes, MD   potassium chloride (KLOR-CON M) 20 MEQ extended release tablet Take 1 tablet by mouth daily Yes Beryle Holes, MD   lisinopril (PRINIVIL;ZESTRIL) 40 MG tablet Take 1 tablet by mouth daily Yes Beryle Holes, MD   isosorbide mononitrate (IMDUR) 30 MG extended release tablet TAKE 1 TABLET BY MOUTH DAILY  Beryle Holes, MD   lisinopril (PRINIVIL;ZESTRIL) 40 MG tablet TAKE 1 TABLET BY MOUTH DAILY  Beryle Holes, MD   furosemide (LASIX) 20 MG tablet TAKE 1 TABLET BY MOUTH DAILY  Fabiola Morel Neurological:      Mental Status: She is alert and oriented to person, place, and time. ASSESSMENT/PLAN:  1. Acute bilateral low back pain without sciatica  Patient was prescribed a steroid pack  She was educated on the medication and its use. She will  Use ice, heat, and stretching. She was shown stretching exercises. She will push fluids and rest.   RTC if not improved. Return for Folllow up with pcp.

## 2020-01-23 NOTE — TELEPHONE ENCOUNTER
RX APPROVAL:      Refill:   Requested Prescriptions     Pending Prescriptions Disp Refills    isosorbide mononitrate (IMDUR) 30 MG extended release tablet [Pharmacy Med Name: ISOSORBIDE MONONITRATE 30MG ER TABS] 90 tablet 0     Sig: TAKE 1 TABLET BY MOUTH DAILY    lisinopril (PRINIVIL;ZESTRIL) 40 MG tablet [Pharmacy Med Name: LISINOPRIL 40MG TABLETS] 90 tablet 3     Sig: TAKE 1 TABLET BY MOUTH DAILY      Last OV: 12/5/19  Last EKG:   Last Labs:   Lab Results   Component Value Date    GLUCOSE 126 11/25/2019    BUN 19 11/25/2019    CREATININE 1.1 11/25/2019    LABGLOM 47 11/25/2019     11/25/2019    K 4.4 11/25/2019    K 3.5 01/10/2018     11/25/2019    CO2 27 11/25/2019    CALCIUM 9.5 11/25/2019     Lab Results   Component Value Date     11/25/2019     11/25/2019    CO2 27 11/25/2019    ANIONGAP 15 11/25/2019    GLUCOSE 126 11/25/2019    BUN 19 11/25/2019    CREATININE 1.1 11/25/2019    LABGLOM 47 11/25/2019    GFRAA 57 11/25/2019    CALCIUM 9.5 11/25/2019    PROT 7.1 01/04/2018    LABALBU 3.6 01/04/2018    BILITOT 0.6 01/04/2018    ALKPHOS 107 01/04/2018    AST 21 01/04/2018    ALT 18 01/04/2018    GLOB 3.2 11/28/2017     Lab Results   Component Value Date    ALT 18 01/04/2018    AST 21 01/04/2018     Lab Results   Component Value Date    K 4.4 11/25/2019    K 3.5 01/10/2018       Plan and labs reviewed

## 2020-01-29 NOTE — TELEPHONE ENCOUNTER
Let her know that Dr. Kathryn Cheng and I are both out of the office, IF the patient can wait have her make a follow up appointment otherwise she should be seen now by ER  if symptoms or pain are worse. Make sure she is taking her medication as prescribed. Thank you.  Dr. Jessica Ulloa

## 2020-01-29 NOTE — ED PROVIDER NOTES
TABLET    TAKE 1 TABLET BY MOUTH DAILY    FUROSEMIDE (LASIX) 40 MG TABLET    Take 1 tablet by mouth daily    ISOSORBIDE MONONITRATE (IMDUR) 30 MG EXTENDED RELEASE TABLET    TAKE 1 TABLET BY MOUTH DAILY    LISINOPRIL (PRINIVIL;ZESTRIL) 40 MG TABLET    Take 1 tablet by mouth daily    LISINOPRIL (PRINIVIL;ZESTRIL) 40 MG TABLET    TAKE 1 TABLET BY MOUTH DAILY    METHYLPREDNISOLONE (MEDROL, RAUL,) 4 MG TABLET    Take as directed. METOPROLOL SUCCINATE (TOPROL XL) 25 MG EXTENDED RELEASE TABLET    TAKE 1 TABLET BY MOUTH DAILY    POTASSIUM CHLORIDE (KLOR-CON M) 20 MEQ EXTENDED RELEASE TABLET    Take 1 tablet by mouth daily         Review of Systems:  Review of Systems   Constitutional: Negative for chills and fever. HENT: Negative for congestion, facial swelling and sore throat. Eyes: Negative for discharge and redness. Respiratory: Negative for apnea, choking and shortness of breath. Cardiovascular: Negative for chest pain. Gastrointestinal: Negative for abdominal pain, nausea and vomiting. Genitourinary: Negative for dysuria. Musculoskeletal: Positive for back pain. Negative for neck pain and neck stiffness. Neurological: Negative for dizziness, tremors, seizures, weakness and headaches. All other systems reviewed and are negative. Positives and Pertinent negatives as per HPI. Except as noted above in the ROS, problem specific ROS was completed and is negative. Physical Exam:  Physical Exam  Vitals signs and nursing note reviewed. Constitutional:       Appearance: She is well-developed. She is not diaphoretic. HENT:      Head: Normocephalic and atraumatic. Nose: Nose normal.   Eyes:      General:         Right eye: No discharge. Left eye: No discharge. Neck:      Musculoskeletal: Normal range of motion and neck supple. Cardiovascular:      Rate and Rhythm: Normal rate and regular rhythm. Heart sounds: Normal heart sounds. No murmur. No friction rub. No gallop. back pain without sciatica    2. Compression fracture of L1 vertebra, initial encounter [facility])        DISPOSITION Decision To Discharge 01/29/2020 07:01:03 PM      PATIENT REFERRED TO:  Jeb Landin, [address] [facility]. [address] [facility] [address]  [phone]    Call in 1 day      Renee Cook MD  [facility] [address]  [phone]    Call in 1 day        DISCHARGE MEDICATIONS:  New Prescriptions    METHOCARBAMOL (ROBAXIN) 500 MG TABLET    Take 1 tablet by mouth 2 times daily    OXYCODONE-ACETAMINOPHEN (PERCOCET) 5-325 MG PER TABLET    Take 1 tablet by mouth every 6 hours as needed for Pain for up to 3 days.        DISCONTINUED MEDICATIONS:  Discontinued Medications    No medications on file              (Please note the MDM and HPI sections of this note were completed with a voice recognition program.  Efforts were made to edit the dictations but occasionally words are mis-transcribed.)    Electronically signed, Cyn Johansen PA-C,          Cyn Johansen PA-C  01/29/20 [id number]

## 2020-01-29 NOTE — ED TRIAGE NOTES
Pt A&O to the ED by EMS; c/o back pain x 1 week. Was seen for this about week ago; pt given steroid pack. Pt stated the medication didn't help and found no relief from it. Pt rates pain 9/10. Vital signs noted and stable. Patient alert and orient x4. Respirations easy and unlabored. Skin warm and dry and appropriate for ethnicity. No acute distress noted at this time.

## 2020-01-30 NOTE — ED NOTES
Discharge and education instructions reviewed. Patient verbalized understanding, teach-back successful. Patient denied questions at this time. No acute distress noted. Patient instructed to follow-up as noted - return to emergency department if symptoms worsen. Patient verbalized understanding. Discharged per EDMD with discharged instructions.        Twila Curtis RN  01/29/20 1954

## 2020-02-01 PROBLEM — S32.019A CLOSED FRACTURE OF FIRST LUMBAR VERTEBRA (HCC): Status: ACTIVE | Noted: 2020-01-01

## 2020-02-01 PROBLEM — N17.9 ACUTE RENAL FAILURE (HCC): Status: ACTIVE | Noted: 2020-01-01

## 2020-02-01 PROBLEM — E86.0 DEHYDRATION: Status: ACTIVE | Noted: 2020-01-01

## 2020-02-01 PROBLEM — N19 ACUTE PRERENAL AZOTEMIA: Status: ACTIVE | Noted: 2020-01-01

## 2020-02-01 NOTE — ED PROVIDER NOTES
[address] [facility]  [address] [address]  Dept: [phone]  Loc: [address] ENCOUNTER        This patient was not seen or evaluated by the attending physician. I evaluated this patient, the attending physician was available for consultation. CHIEF COMPLAINT    Chief Complaint   Patient presents with    Fatigue     here 2 days ago, has a L1 fracture, pt d/c with muscle relaxer, pt hasn't been eating or drinking last couple days, family concerned. pt just sleeping        HPI    Grant Clarke is a 80 y.o. female who presents with low back pain after a fall 2 days ago. She reports having a new L1 fracture. She was sent home on oxycodone and muscle relaxers. She lives at home by herself. Her daughter checked on her today and found that the patient had basically been laying around for 2 days not eating and drinking. She has had some SOB but denies any at this time. She states that she hasn't eaten in two days except for a  spoonful of chicken salad earlier today. She has had minimal fluids today - only 1/2 bottle of water per her son and daughter. She has generalized weakness. She is unable to rate her pain but describes the pain has sharp and aching. The pain duration has been constant since the onset. There are no alleviating factors. She denies any fever, chills, chest pain, changes in bowel/bladder, numbness and tingling in lower extremities or radiating pain. REVIEW OF SYSTEMS    Cardiac: No chest pain or palpitations  Respiratory: No shortness of breath or new cough  General: see HPI, no fevers, decreased appetite and fluid intake  GI: No abdominal pain or diarrhea, no nausea, no vomiting  Neuro: see HPI, no LOC  All other systems reviewed and are negative.     PAST MEDICAL & SURGICAL HISTORY    Past Medical History:   Diagnosis Date    Anxiety     Aortic stenosis, severe     CAD (coronary artery disease)     CHF (congestive heart failure) (HCC)     Hyperlipidemia     Hypertension      Past Surgical History:   Procedure Laterality Date    AORTIC VALVE SURGERY  01/2018    TAVR,  juan josé landon    BACK SURGERY      CHOLECYSTECTOMY      CORONARY ANGIOPLASTY WITH STENT PLACEMENT  12/2017    Two Stents    TONSILLECTOMY         CURRENT MEDICATIONS        ALLERGIES    No Known Allergies    SOCIAL & FAMILY HISTORY  Social History     Socioeconomic History    Marital status:       Spouse name: None    Number of children: None    Years of education: None    Highest education level: None   Occupational History    None   Social Needs    Financial resource strain: None    Food insecurity:     Worry: None     Inability: None    Transportation needs:     Medical: None     Non-medical: None   Tobacco Use    Smoking status: Never Smoker    Smokeless tobacco: Never Used   Substance and Sexual Activity    Alcohol use: No    Drug use: No    Sexual activity: Not Currently   Lifestyle    Physical activity:     Days per week: None     Minutes per session: None    Stress: None   Relationships    Social connections:     Talks on phone: None     Gets together: None     Attends Hoahaoism service: None     Active member of club or organization: None     Attends meetings of clubs or organizations: None     Relationship status: None    Intimate partner violence:     Fear of current or ex partner: None     Emotionally abused: None     Physically abused: None     Forced sexual activity: None   Other Topics Concern    None   Social History Narrative    None     Family History   Problem Relation Age of Onset    Other Mother     Cancer Sister        PHYSICAL EXAM    VITAL SIGNS: BP (!) 158/65   Pulse 65   Temp 97.5 °F (36.4 °C) (Oral)   Resp 16   Wt 100 lb 3 oz (45.4 kg)   SpO2 97%   BMI 19.57 kg/m²   Constitutional:  Well developed, no acute distress  Eyes:  Pupils equally round Laboratory  [address] [facility] [employer]   Phone [phone]   URINE RT REFLEX TO CULTURE - Abnormal; Notable for the following components:    Clarity, UA CLOUDY (*)     Bilirubin Urine MODERATE (*)     Ketones, Urine TRACE (*)     All other components within normal limits    Narrative:     Performed at:  [facility]  [address] [employer]   Phone [phone]   TROPONIN - Abnormal; Notable for the following components:    Troponin 0.07 (*)     All other components within normal limits    Narrative:     Performed at:  [facility]  [address] [facility] [employer]   Phone [phone]   BASIC METABOLIC PANEL W/ REFLEX TO MG FOR LOW K - Abnormal; Notable for the following components:    Glucose 68 (*)     BUN 63 (*)     CREATININE 1.6 (*)     GFR Non- 30 (*)     GFR  37 (*)     Calcium 8.2 (*)     All other components within normal limits    Narrative:     Performed at:  [facility]  [address] [facility] [employer]   Phone [phone]   CBC WITH AUTO DIFFERENTIAL - Abnormal; Notable for the following components:    RBC 3.83 (*)     Hemoglobin 11.8 (*)     Hematocrit 35.7 (*)     All other components within normal limits    Narrative:     Performed at:  [facility]  [address] [facility] [employer]   Phone [phone]   TROPONIN - Abnormal; Notable for the following components:    Troponin 0.07 (*)     All other components within normal limits    Narrative:     Performed at:  [facility]  [address] [employer]   Phone [phone]   COMPREHENSIVE METABOLIC PANEL - Abnormal; Notable for the following components:    Glucose 67 (*)     BUN 65 (*)     CREATININE 1.7 (*)     GFR Non- 28 (*)     GFR  diagnosis includes but is not limited to anemia, dehydration, electrolyte derangement, ACS, rhabdomyolysis, infectious process, encephalopathy, other. She is nontoxic in appearance and hemodynamically stable. Overall weakness since L1 fracture 2 days ago and a decline in ADLs and eating. The daughter and son brought the patient into the emergency department today to be evaluated because her pain was not controlled and they were concerned that she was taking too much pain medication and muscle relaxer. Urinalysis is unremarkable for infection. She has no leukocytosis or anemia on CBC. CK total was 88. Sodium is 133 with a potassium of 5.8. Chlorides 93, CO2 14, anion gap 26 with a glucose of 75. BUN is 80 with a creatinine of 2.3, GFR is 20. She has an alk phos that is elevated at 149. She has an elevated troponin of 0.07. This could be from her WANG. She has no chest pain or significant findings on EKG. She was given 2 L of fluid, dextrose, insulin and sodium bicarb for the elevated potassium. We will recheck her labs once her fluids have infused. Portable chest x-rays interpreted by radiology shows no acute pulmonary disease. Perfect serve to hospitalist for admission. She is going to need further monitoring of kidney function. The patient and the family members were updated on the plan of care and they agree. FINAL IMPRESSION    1. Acute renal failure, unspecified acute renal failure type ([address]    2. Dehydration    3. Elevated troponin    4.  Hyperkalemia        PLAN  Admission    (Please note that this note was completed with a voice recognition program.  Every attempt was made to edit the dictations, but inevitably there remain words that are mis-transcribed.)           Patricia Lundberg, APRN - CNP  02/01/20 [address] [facility], APRN - CNP  02/07/20 [id number]

## 2020-02-01 NOTE — PROGRESS NOTES
Pt arrived via stretcher from ED to room 4114, accompanied by daughter and son  VS, assessment, and admission complete. 4 eyes assessment complete. Patient is alert and orientated x4, c/o pain to the lower back with movement Pt oriented to unit and room. Call light and bedside table in reach. All questions answered. Pt resting quietly in bed with no complaints or voiced needs at this time. Will continue to monitor.

## 2020-02-01 NOTE — PROGRESS NOTES
02/01/2020    LABGLOM 30 02/01/2020    LABGLOM 28 02/01/2020    GLUCOSE 68 02/01/2020    GLUCOSE 67 02/01/2020       The patient has been seen and examined. She is very sleepy and not able to stay awake for exam or to really participate in PT. Percocet is likely too strong - may benefit from kyphoplasty vs brace. I have the following recommendations:    1. Acute renal failure - improving now with gentle IVF  2. Acute metabolic encephalopathy - stop percocet - will start prn tramadol - too sleepy - monitor closely  3.   Moderate prot jen malnutrition - pt currently not eating or drinking - will offer ensure once more alert  4.  t12 compression fx - needs further workup - will do bone scan vs MRI and consult ortho - may benefit from repair if acute          Electronically signed by Yudith Lew MD on 2/1/2020 at 1:37 PM

## 2020-02-01 NOTE — PROGRESS NOTES
Pharmacy Medication Reconciliation Note     List of medications patient is currently taking is complete. Source of information:   1. Pt, more alert this morning. Daughter is able to assist with conversation    2. Fill hx and Epic notes     No Known Allergies    Notes regarding home medications:   1. Still takes a daily Asprin. Does not take Plavix. Removed    2. Lasix dose should be 40 mg daily, [facility] is auto filling Lasix 20 mg. Daughter is not certain she is taking two at a time. Family will f/u with [facility] post discharge   3. Medrol dose pack was prescribed by PCP. She has completed this  4.  Percocet and Robaxin were ordered at ED visit 1-29-20 2/1/2020  12:16 PM

## 2020-02-01 NOTE — H&P
Hospital Medicine  History and Physical    PCP: Umesh Chacon DO  Patient Name: Yamilet Coles    Date of Service: Pt seen/examined on 02/01/2020 and Admitted to Inpatient with expected LOS greater than two midnights due to medical therapy    CHIEF COMPLAINT:  Pt c/o not eating or drinking  HISTORY OF PRESENT ILLNESS: Patient is an 40-year-old woman with a history of hypertension, hyperlipidemia and coronary artery disease who presents to the emergency room following a 2 day history of not eating or drinking. She initially presented to this emergency room two days ago and was found to have an L1 fracture and was discharged with a muscle relaxer and pain medications. Since that time she has mainly been sleeping but has had little or no oral intake. In the emergency room she was found to have dehydration, azotemia and acute renal failure. She is being admitted for further evaluation and treatment. Associated signs and symptoms do not include numbness, tingling, weakness, saddle anesthesia, bowel or bladder dysfunction or rash        Past Medical History:        Diagnosis Date    Anxiety     Aortic stenosis, severe     CAD (coronary artery disease)     CHF (congestive heart failure) (HCC)     Hyperlipidemia     Hypertension        Past Surgical History:        Procedure Laterality Date    AORTIC VALVE SURGERY  01/2018    TAVR,  juan josé clayton vesdaiana    BACK SURGERY      CHOLECYSTECTOMY      CORONARY ANGIOPLASTY WITH STENT PLACEMENT  12/2017    Two Stents    TONSILLECTOMY         Allergies:  Patient has no known allergies. Medications Prior to Admission:    Prior to Admission medications    Medication Sig Start Date End Date Taking? Authorizing Provider   oxyCODONE-acetaminophen (PERCOCET) 5-325 MG per tablet Take 1 tablet by mouth every 6 hours as needed for Pain for up to 3 days.  1/29/20 2/1/20  Milton Turk PA-C   methocarbamol (ROBAXIN) 500 MG tablet Take 1 tablet by mouth 2 times daily 1/29/20 80y.o. year-old  female who is alert, appears stated age and is cooperative  HEENT: Head: Normocephalic, no lesions, without obvious abnormality. Eye: Normal external eye, conjunctiva, lids cornea, KITA. Ears: Normal external ears. Non-tender. Nose: Normal external nose, mucus membranes and septum. Pharynx: Dental Hygiene adequate. Normal buccal mucosa. Normal pharynx. Neck: no adenopathy, no carotid bruit, no JVD, supple, symmetrical, trachea midline and thyroid not enlarged, symmetric, no tenderness/mass/nodules  Lungs: clear to auscultation bilaterally and no use of accessory muscles. Heart: regular rate and rhythm, S1, S2 normal, no murmur, click, rub or gallop and normal apical impulse  Abdomen: soft, non-tender; bowel sounds normal; no masses,  no organomegaly  Extremities: extremities atraumatic, no cyanosis or edema and Homans sign is negative, no sign of DVT. Capillary Refill: Acceptable < 3 seconds  Peripheral Pulses: +3 easily felt, not easily obliterated with pressures  Skin: Skin color, texture, turgor normal. No rashes or lesions on exposed skin  Neurologic: Neurovascularly intact without any focal sensory/motor deficits. Cranial nerves: II-XII intact, grossly non-focal. Gait was not tested.   Psychiatric: Alert and oriented, thought content appropriate, normal insight    CBC:   Recent Labs     01/31/20  2202   WBC 9.8   HGB 13.7        BMP:    Recent Labs     01/31/20 2207   *   K 5.8*   CL 93*   CO2 14*   BUN 80*   CREATININE 2.3*   GLUCOSE 75     Troponin:   Recent Labs     01/31/20 2207   TROPONINI 0.07*     PT/INR:  No results found for: PTINR  U/A:    Lab Results   Component Value Date    LEUKOCYTESUR Negative 01/31/2020    RBCUA 1 01/31/2020    SPECGRAV 1.016 01/31/2020    UROBILINOGEN 1.0 01/31/2020    BILIRUBINUR MODERATE 01/31/2020    BLOODU Negative 01/31/2020    GLUCOSEU Negative 01/31/2020    PROTEINU Negative 01/31/2020         RAD:   I have independently normal limits. Prosthetic valve is noted. PLEURA/LUNGS: There are no focal consolidations or pleural effusions. There is no appreciable pneumothorax. BONES/SOFT TISSUE: No acute abnormality. Surgical clips are noted in the right upper quadrant. No acute pulmonary disease. Assessment:   Principal Problem:    Acute renal failure (HCC)  Active Problems:    Essential hypertension    Coronary artery disease involving native coronary artery of native heart without angina pectoris    Mixed hyperlipidemia    Dehydration    Acute prerenal azotemia    Closed fracture of first lumbar vertebra (HCC)  Resolved Problems:    * No resolved hospital problems. *      Plan:     Dehydration - likely secondary to poor oral intake. Hydrate with IV Normal Saline and monitor. Acute renal failure - due to dehydration. Will hydrate and avoid nephrotoxic agents. Acute prerenal azotemia - likely due to dehydration and ARF. Will monitor and expect to improve with hydration. Closed fracture of first lumbar vertebra ([address] - continue current medications, but consider reducing the dose as she seems to be sleeping excessively    Coronary artery disease involving native coronary arter y of native heart without angina pectoris - stable, continue Aspirin    Essential (primary) hypertension - per history; is not on treatment with antihypertensive medications at home. Monitor blood pressure    Hyperlipidemia - No currently on statin therapy. Defer to PCP        DVT Prophylaxis: Heparin  Diet: DIET GENERAL;  Code Status: Full Code  (Advanced care planning has been discussed with patient and/or responsible family member and is reflected in the code status. Further orders associated with this have been entered if appropriate)    Disposition: Anticipate that patient will remain in the hospital for 2 to 3 days depending on further evaluation and clinical course.      Kurt Estrella MD

## 2020-02-01 NOTE — PROGRESS NOTES
4 Eyes Skin Assessment     The patient is being assess for  Admission    I agree that 2 RN's have performed a thorough Head to Toe Skin Assessment on the patient. ALL assessment sites listed below have been assessed. Areas assessed by both nurses: ***  [x]   Head, Face, and Ears   [x]   Shoulders, Back, and Chest  [x]   Arms, Elbows, and Hands   [x]   Coccyx, Sacrum, and IschIum  [x]   Legs, Feet, and Heels        Does the Patient have Skin Breakdown?   Yes LDA WOUND CARE was Initiated documentation include the Elidia-wound, Wound Assessment, Measurements, Dressing Treatment, Drainage, and Color\",         Gustavo Prevention initiated:  No   Wound Care Orders initiated:  Yes      [id number]  nurse consulted for Pressure Injury (Stage 3,4, Unstageable, DTI, NWPT, and Complex wounds), New and Established Ostomies:  No      Nurse 1 eSignature: Electronically signed by Stephon Rosa RN on 2/1/20 at 8:15 AM    **SHARE this note so that the co-signing nurse is able to place an eSignature**    Nurse 2 eSignature: {Esignature:[id number]}

## 2020-02-01 NOTE — PROGRESS NOTES
Nutrition Assessment    Type and Reason for Visit: Initial, Positive Nutrition Screen    Nutrition Recommendations:   Continue diet free of therapeutic restrictions to promote intake   Ensure BID  Will monitor nutritional adequacy, nutrition-related labs, weights, BMs, and clinical progress     Nutrition Assessment: Pt with nutrition risk r/t poor po intake for last few days. Visited during lunch, daughter at bedside. Pt poor historian. Has not eaten since admission. Favorable to ONS. Current wt 100 lb, which appears to be stable. Malnutrition Assessment:  · Malnutrition Status: At risk for malnutrition  · Context: Acute illness or injury  · Findings of the 6 clinical characteristics of malnutrition (Minimum of 2 out of 6 clinical characteristics is required to make the diagnosis of moderate or severe Protein Calorie Malnutrition based on AND/ASPEN Guidelines):  1. Energy Intake-Less than or equal to 50% of estimated energy requirement, (3 days )    2. Weight Loss-No significant weight loss,    3. Fat Loss-Unable to assess,    4. Muscle Loss-Unable to assess,    5. Fluid Accumulation-No significant fluid accumulation,    6.   Strength-Not measured    Nutrition Risk Level: High    Nutrient Needs:  · Estimated Daily Total Kcal: 3801-4044 kcal (25-30 kcal/kg ABW)  · Estimated Daily Protein (g): 36-45 gm (0.8-1 gm/kg ABW))  · Estimated Daily Total Fluid (ml/day): Less than 64 oz d/t hx of CHF     Nutrition Diagnosis:   · Problem: Inadequate oral intake  · Etiology: related to Insufficient energy/nutrient consumption     Signs and symptoms:  as evidenced by Diet history of poor intake    Objective Information:  · Nutrition-Focused Physical Findings: Thin, unsure if any wasting outside of scope of normal aging   · Wound Type: Pressure Ulcer, Unstageable  · Current Nutrition Therapies:  · Oral Diet Orders: General   · Oral Diet intake: Unable to assess  · Oral Nutrition Supplement (ONS) Orders: None  · ONS intake: Unable to assess  · Anthropometric Measures:  · Ht:   SAMUEL  · Current Body Wt: 100 lb (45.4 kg)  · Usual Body Wt: 100 lb (45.4 kg)  · % Weight Change:  ,  appears stable   · Ideal Body Wt: (SAMUEL, no ht on file ),   · BMI Classification: (SAMUEL, no ht on file )    Nutrition Interventions:   Continue current diet, Start ONS  Continued Inpatient Monitoring    Nutrition Evaluation:   · Evaluation: Goals set   · Goals:  Tolerate diet and consume greater than 50% of meals and supplements this admission     · Monitoring: Meal Intake, Supplement Intake, Diet Tolerance, Mental Status/Confusion, Weight, Pertinent Labs, Nausea or Vomiting, Monitor Bowel Function      Electronically signed by Portillo Lacey RD, LD on 2/1/20 at 1:40 PM    Contact Number: [phone]

## 2020-02-01 NOTE — PROGRESS NOTES
Occupational Therapy   Occupational Therapy Initial Assessment  Date: 2020   Patient Name: Ely Mohr  MRN: [id number]     : 1930    Date of Service: 2020    Discharge Recommendations:  Continue to assess pending progress, Patient would benefit from continued therapy after discharge  OT Equipment Recommendations  Other: TBD--has standard toilet, may benefit from TSF (family will need to clarify)     Ely Mohr scored a 14/24 on the AM-PAC ADL Inpatient form. Current research shows that an AM-PAC score of 17 or less is typically not associated with a discharge to the patient's home setting. Based on the patients AM-PAC score and their current ADL deficits, it is recommended that the patient have 3-5 sessions per week of Occupational Therapy at d/c to increase the patients independence. Assessment   Performance deficits / Impairments: Decreased functional mobility ; Decreased cognition;Decreased high-level IADLs;Decreased ADL status; Decreased endurance;Decreased strength;Decreased balance;Decreased posture;Decreased safe awareness  Assessment: pt is an 81 y/o female w/ recent hospitalizations for L1 and T12 compression fx's, dehydration and ARF. PTA, pt was fairly IND w/ ADLs, household t/f using RW or cane, she prepared meals in microwave; daughter [address] came over daily to assist w/ laundry, shopping & transportation. Pt is functioning below her previous occupational performance level--she required mod/max A w/ LB ADLs, mod A w/ toileting, CGA/min A w/ bed mobility & household transfers using RW; she displays lethargy and posterior LOB in standing. She is currently receiving IV fluids & is unsafe to return home at this time. She would benefit from continued OT services while in hospital to address above deficit areas.  Anticipate she will need 24 hr care  Treatment Diagnosis: impaired ADLs, t/f, fxl mob  Prognosis: Good  Decision Making: Medium Complexity  History: HLD, HTN, CAD, L1 & T12 comp fx, ARF, dehydration  Exam: impaired ADLs, t/f, fxl mobility, balance & strength  Assistance / Modification: mod/max A w/ toileting & LB ADLs, min/CGA w/ household t/f & fxl mob using RW, posterior leaning/LOB in standing, back pain limits movmts  OT Education: OT Role;Plan of Care;Precautions  Patient Education: educated pt on role of OT, POC and reviewed BLT prec  Barriers to Learning: impaired STM, lethargy  REQUIRES OT FOLLOW UP: Yes  Activity Tolerance  Activity Tolerance: Patient limited by fatigue;Patient limited by pain  Safety Devices  Safety Devices in place: Yes  Type of devices: Bed alarm in place; Left in bed;Call light within reach;Gait belt           Patient Diagnosis(es): The primary encounter diagnosis was Acute renal failure, unspecified acute renal failure type ([facility] [address] Diagnoses of Dehydration, Elevated troponin, and Hyperkalemia were also pertinent to this visit. has a past medical history of Anxiety, Aortic stenosis, severe, CAD (coronary artery disease), CHF (congestive heart failure) ([facility] [address] Hyperlipidemia, and Hypertension. has a past surgical history that includes Cholecystectomy; back surgery; Tonsillectomy; Coronary angioplasty with stent (12/2017); and Aortic valve surgery (01/2018).     Treatment Diagnosis: impaired ADLs, t/f, fxl mob      Restrictions  Restrictions/Precautions  Restrictions/Precautions: Fall Risk  Position Activity Restriction  Spinal Precautions: No Bending, No Lifting, No Twisting  Other position/activity restrictions: would assume BLT prec d/t recent compression fx's, on IV fluids, general diet    Subjective   General  Chart Reviewed: Yes  Patient assessed for rehabilitation services?: Yes  Additional Pertinent Hx: per Dr Cintia Marcelino H&P note on 2/1/20:\"HISTORY OF PRESENT ILLNESS: Patient is an 79-year-old woman with a history of hypertension, hyperlipidemia and coronary artery disease who presents to the emergency room following a 2 day history of not

## 2020-02-02 NOTE — PROGRESS NOTES
term goal 2: Transfer with assist device SBA, safe technique. Short term goal 3: Amb with assist device 100' SBA, safe technique. Patient Goals   Patient goals : I want to go home.          Therapy Time   Individual Concurrent Group Co-treatment   Time In 0700         Time Out 0745         Minutes [address], PT

## 2020-02-02 NOTE — PROGRESS NOTES
Hospital Medicine Progress Note      Admit Date: 1/31/2020         Overnight Events: No    CC: F/U for acute renal failure, L1 T12 fracture    HPI: The patient is a 80 yrs old female, with a past medical history significant for anxiety, aortic stenosis, coronary artery disease, CHF, hyperlipidemia and hypertension, who presented to [facility] ER for further evaluation following a 2-day history of poor oral intake. 2 days prior to this, the patient presented to the emergency department where she was found to have an L1 fracture. She was discharged with a muscle relaxer as well as pain medications. Apparently, the patient had been doing nothing since sleeping since that time. In emergency department, the patient was found to have dehydration, as well as acute renal failure. She was admitted for further work-up and treatment. Pain medication was changed from Percocet to tramadol-as she was too sedated. This was the suspected cause for her acute metabolic encephalopathy. IV fluids were administered for acute injury. In addition to the L1 fracture that was noted on her prior hospital presentation, there was a T12 compression fracture. This required further work-up. Orthopedic surgery was consulted. MRI was ordered. Interval History/Subjective: No subjective from the patient. She does indicate that she is in no acute distress. She is unsure pain medications have been working. Apparently she had asked for this earlier in the day but when the nurse returned she was sleeping. Review of Systems:     Unable to perform comprehensive review of systems due to the patient's somnolence.     Past Medical History:        Diagnosis Date    Anxiety     Aortic stenosis, severe     CAD (coronary artery disease)     CHF (congestive heart failure) (HCC)     Hyperlipidemia     Hypertension        Past Surgical History:        Procedure Laterality Date    AORTIC VALVE SURGERY  01/2018    TAVR,  drs cook vester  BACK SURGERY      CHOLECYSTECTOMY      CORONARY ANGIOPLASTY WITH STENT PLACEMENT  12/2017    Two Stents    TONSILLECTOMY         Allergies:  Patient has no known allergies. Past medical and surgical history reviewed. Any changes have been noted. PHYSICAL EXAM:  BP (!) 189/78   Pulse 66   Temp 97.8 °F (36.6 °C) (Oral)   Resp 16   Wt 97 lb 14.2 oz (44.4 kg)   SpO2 97%   BMI 19.12 kg/m²       Intake/Output Summary (Last 24 hours) at 2/2/2020 1057  Last data filed at 2/2/2020 0911  Gross per 24 hour   Intake 440 ml   Output --   Net 440 ml       General: Somnolent but arousable. Resting in bed in no apparent distress. HEENT: Normocephalic. Atraumatic. Pupils equal and reactive. EOM intact. Oral mucosa pink/moist/intact. Neck: Supple. Symmetrical. Trachea midline. Lungs: Clear to auscultation bilaterally. Respirations even and unlabored. Chest: Exam unremarkable. Cardiac: S1/S2 noted. Regular Rhythm and rate. Abdomen/GI: Soft. Non-tender. Non-distended. BS+. Extremities: PP+. Atraumatic. No redness/cyanosis/edema noted. Brisk cap refill. Skin: Dry and intact. No lesions noted. Neuro: Grossly intact. No focal deficits noted. LABS:    Lab Results   Component Value Date    WBC 7.0 02/02/2020    HGB 11.7 (L) 02/02/2020    HCT 34.9 (L) 02/02/2020    MCV 93.3 02/02/2020     02/02/2020    LYMPHOPCT 20.1 02/02/2020    RBC 3.74 (L) 02/02/2020    MCH 31.2 02/02/2020    MCHC 33.4 02/02/2020    RDW 13.9 02/02/2020       Lab Results   Component Value Date    CREATININE 1.1 02/02/2020    BUN 35 (H) 02/02/2020     02/02/2020    K 4.2 02/02/2020     02/02/2020    CO2 23 02/02/2020       Lab Results   Component Value Date    MG 1.70 01/10/2018       Lab Results   Component Value Date    ALT 13 02/01/2020    AST 18 02/01/2020    ALKPHOS 125 02/01/2020    BILITOT 0.3 02/01/2020        No flowsheet data found.     Lab Results   Component Value Date    LABA1C 6.0 10/07/2016 Imaging:  XR CHEST PORTABLE   Final Result   No acute pulmonary disease. NM BONE SCAN LIMITED    (Results Pending)       Scheduled and prn Medications:    Scheduled Meds:   methocarbamol  500 mg Oral BID    isosorbide mononitrate  30 mg Oral Daily    sodium chloride flush  10 mL Intravenous 2 times per day    famotidine  20 mg Oral Daily    heparin (porcine)  5,000 Units Subcutaneous BID    sodium chloride  1,000 mL Intravenous Once     Continuous Infusions:   sodium chloride 75 mL/hr at 02/01/20 0245    dextrose       PRN Meds:.sodium chloride flush, ondansetron, acetaminophen, glucose, dextrose, glucagon (rDNA), dextrose, traMADol    Assessment & Plan:        Acute Kidney Injury  2/2 poor oral intake   IVF  Avoid nephrotoxic medications as able. Renally dose medications. Monitor for electrolyte abnormalities   Creatinine is improved from 2.3 --> 1.1    Acute metabolic encephalopathy  Percocet --> tramadol  Has been too sedated, though this could have been the effects of pain. No identified or suspected infection  May be some component of hospital delirium. Normalize sleep-wake cycles  Avoid constipation  Pain management as appropriate  Frequent reorientation    Moderate protein calorie malnutrition  2/2 poor oral intake  Dietician evaluation  Follow labs and vitals  Encourage PO intake  Dietary supplements when better tolerating oral foods    T12 compression fx  Orthopedic surgery consulted. Appreciate assistance. T12 fracture does require further work-up. - MRI ordered  PRN pain management  - Lidoderm patch, tramadol  As needed tramadol. Lidoderm patches. PT/OT    CAD  Continue medical management and risk factor modification    Non-zero troponin  Most likely elevated 2/2 acute kidney injury  No current CP  Continue to monitor closely      HTN  Continue imdur. Resume home lisinopril. Monitor BP  Titrate medications as needed.     HLD  Statin  Monitor for S/S of Rhabdomyolysis Body mass index is 19.12 kg/m². The patient and / or the family were informed of the results of any tests, a time was given to answer questions, a plan was proposed and they agreed with plan.     DVT prophylaxis: [x] Lovenox  [] SQ Heparin  [] SCDs because of  [] warfarin/oral direct thrombin inhibitor [] Encourage ambulation    GI prophylaxis: [x] PPI/[id number]  [] not indicated    Probiotic if on abx: [] Yes [] No [x] Not Indicated    Diet: DIET GENERAL;  Dietary Nutrition Supplements: Standard High Calorie Oral Supplement    Consults:  IP CONSULT TO HOSPITALIST  IP CONSULT TO ORTHOPEDIC SURGERY      Disposition:  [] Home  [] Home with home health [] Rehab [] Psych [x] SNF  [] LTAC  [] Long term nursing home or group home [] Transfer to ICU  [] Transfer to PCU [] Other:    Code Status: Full Code    ELOS: TBD      Suri Hartman, APRN - NP  02/02/20

## 2020-02-02 NOTE — PLAN OF CARE
Pt c/o back pain, prn pain medication given as ordered. Continent, uses walker to ambulate. Gait steady. Problem: Falls - Risk of:  Goal: Will remain free from falls  Description  Will remain free from falls  Outcome: Ongoing     Problem: Risk for Impaired Skin Integrity  Goal: Tissue integrity - skin and mucous membranes  Description  Structural intactness and normal physiological function of skin and  mucous membranes.   Outcome: Ongoing     Problem: Pain:  Goal: Pain level will decrease  Description  Pain level will decrease  Outcome: Ongoing     Problem: Nutrition  Goal: Optimal nutrition therapy  2/2/2020 0316 by Loy Winchester RN  Outcome: Ongoing  2/1/2020 1343 by Geetha Villanueva RD, LD  Outcome: Ongoing

## 2020-02-03 NOTE — PLAN OF CARE
Straight cath preformed on pt per order. 675ml out. Pt immediately had some pain relief and was able to sleep. Will continue to monitor.  Electronically signed by El Johnson RN on 2/3/2020 at 11:01 AM

## 2020-02-03 NOTE — PROGRESS NOTES
Intravenous Q6H    acetaminophen  1,000 mg Oral 3 times per day    methocarbamol  250 mg Oral BID    vitamin D  50,000 Units Oral Weekly    lisinopril  40 mg Oral Daily    lidocaine  1 patch Transdermal Daily    isosorbide mononitrate  30 mg Oral Daily    sodium chloride flush  10 mL Intravenous 2 times per day    famotidine  20 mg Oral Daily    heparin (porcine)  5,000 Units Subcutaneous BID    sodium chloride  1,000 mL Intravenous Once     Continuous Infusions:   dextrose       PRN Meds:.traMADol, sodium chloride flush, ondansetron, glucose, dextrose, glucagon (rDNA), dextrose    PHYSICAL EXAM:  BP (!) 152/84   Pulse 75   Temp 98 °F (36.7 °C) (Oral)   Resp 18   Ht 5' (1.524 m)   Wt 97 lb 14.2 oz (44.4 kg)   SpO2 95%   BMI 19.12 kg/m²       Intake/Output Summary (Last 24 hours) at 2/3/2020 1930  Last data filed at 2/3/2020 1822  Gross per 24 hour   Intake 4644 ml   Output 1475 ml   Net 3169 ml       General: Alert and oriented. Resting in bed in no apparent distress. Somewhat lethargic but wakes up and answers questions appropriately, daughter present at the bedside  HEENT: Normocephalic. Atraumatic. Pupils equal and reactive. EOM intact. Oral mucosa pink/moist/intact. Neck: Supple. Symmetrical. Trachea midline. Lungs: Clear to auscultation bilaterally. Respirations even and unlabored. Chest: Exam unremarkable. Cardiac: S1/S2 noted. Regular Rhythm and rate. Abdomen/GI: Soft. Non-tender. Non-distended. BS+. Extremities: PP+. Atraumatic. No redness/cyanosis/edema noted. Brisk cap refill. Skin: Dry and intact. No lesions noted. Neuro: Grossly intact. No focal deficits noted.    M/s: No pain with straight leg raise, no pain with palpation of this area today    LABS:    Lab Results   Component Value Date    WBC 9.7 02/03/2020    HGB 13.5 02/03/2020    HCT 40.0 02/03/2020    MCV 92.6 02/03/2020     02/03/2020    LYMPHOPCT 14.1 02/03/2020    RBC 4.31 02/03/2020    MCH 31.3 02/03/2020

## 2020-02-03 NOTE — PLAN OF CARE
MRI called to notify nurse that bladder appears to be full. Upon bladder scan pt was holding 722ml in bladder. MD notified.  Electronically signed by Chad Vieira RN on 2/3/2020 at 10:03 AM

## 2020-02-03 NOTE — PLAN OF CARE
Problem: Falls - Risk of:  Goal: Will remain free from falls  Description  Will remain free from falls  Outcome: Met This Shift  Goal: Absence of physical injury  Description  Absence of physical injury  Outcome: Met This Shift     Problem: Pain:  Description  Pain management should include both nonpharmacologic and pharmacologic interventions. Goal: Pain level will decrease  Description  Pain level will decrease  Outcome: Met This Shift     Problem: Risk for Impaired Skin Integrity  Goal: Tissue integrity - skin and mucous membranes  Description  Structural intactness and normal physiological function of skin and  mucous membranes. Outcome: Ongoing     Problem: Pain:  Description  Pain management should include both nonpharmacologic and pharmacologic interventions.   Goal: Control of acute pain  Description  Control of acute pain  Outcome: Ongoing  Goal: Control of chronic pain  Description  Control of chronic pain  Outcome: Ongoing

## 2020-02-03 NOTE — PLAN OF CARE
Problem: Falls - Risk of:  Goal: Will remain free from falls  Description  Will remain free from falls  Goal: Absence of physical injury  Description  Absence of physical injury  Outcome: Ongoing  Fall risk assessed. Precautions in place. Bed low and locked with side rails up x3. Nonskid socks on when OOB. Bed/chair alarm utilized. Call light within reach. Frequent checks performed. No falls at this time. Problem: Risk for Impaired Skin Integrity  Goal: Tissue integrity - skin and mucous membranes  Description  Structural intactness and normal physiological function of skin and  mucous membranes. Outcome: Ongoing     Problem: Pain:  Description  Pain management should include both nonpharmacologic and pharmacologic interventions. Goal: Pain level will decrease  Description  Pain level will decrease  Outcome: Ongoing    Goal: Control of acute pain  Description  Control of acute pain  Outcome: Ongoing  Goal: Control of chronic pain  Description  Control of chronic pain  Outcome: Ongoing  Pain level assessed. Pt able to rate pain on a scale of 0-10. Administered PRN analgesics per order. Assisted with repositioning for comfort. Reassessed for effectiveness. Will continue to monitor.       Problem: Nutrition  Goal: Optimal nutrition therapy  Outcome: Ongoing

## 2020-02-03 NOTE — PROGRESS NOTES
Physical Therapy  Attempt Note  French Gunter    The pt found to be in the bed, awake and her dgt also in the room. The pt reporting 8-9/10 pain in her L hip area and she is declining therapy at this time. Offered to take the pt to the bathroom but she had the pure-wick on. The pt reported she had an MRI this morning and per the chart, neurosx has been consulted but has not yet seen the pt. Will attempt to see tomorrow for mobility if the pt is able to participate. If pt. is D/C'd prior to next visit please refer back to last daily progress note for D/C status.     Electronically signed by Bernadine Cordero, PT [id number] on 2/3/2020 at 2:14 PM

## 2020-02-03 NOTE — TELEPHONE ENCOUNTER
FYI Daughter stopped in, saw dr VILLAGOMEZ 3 weeks ago for back issue, was prescribed steriods which did not help; went to ER last Wed, found crack in L1 verebrae; went back to hospital fri evening, still in pain and not eating; found to be dehydrated.  Pt still in hospital

## 2020-02-04 NOTE — PROGRESS NOTES
After speaking with Dr Cintia Clarke this morning he states that fracture is likely amendable to kyphoplasty. She likely has osteoporosis with a pathological fracture. Will get spep to r/o other causes. Vitamin d level undetectable and started on supplements. Spoke with Antonieta Welch from [facility] and they will attempt later this afternoon.       Amelie Steele, ASHLY - CNP 2/4/2020 8:50 AM

## 2020-02-04 NOTE — PLAN OF CARE
Problem: Falls - Risk of:  Goal: Will remain free from falls  Description  Will remain free from falls  2/4/2020 1051 by Marc Julian RN  Outcome: Ongoing  2/3/2020 2142 by Patricia Olsen RN  Outcome: Ongoing  Goal: Absence of physical injury  Description  Absence of physical injury  2/4/2020 1051 by Marc Julian RN  Outcome: Ongoing  2/3/2020 2142 by Patricia Olsen RN  Outcome: Ongoing     Problem: Risk for Impaired Skin Integrity  Goal: Tissue integrity - skin and mucous membranes  Description  Structural intactness and normal physiological function of skin and  mucous membranes.   2/4/2020 1051 by Marc Julian RN  Outcome: Ongoing  2/3/2020 2142 by Patricia Olsen RN  Outcome: Ongoing     Problem: Pain:  Goal: Pain level will decrease  Description  Pain level will decrease  2/4/2020 1051 by Marc Julian RN  Outcome: Ongoing  2/3/2020 2142 by Patricia Olsen RN  Outcome: Ongoing  Goal: Control of acute pain  Description  Control of acute pain  2/4/2020 1051 by Marc Julian RN  Outcome: Ongoing  2/3/2020 2142 by Patricia Olsen RN  Outcome: Ongoing  Goal: Control of chronic pain  Description  Control of chronic pain  Outcome: Ongoing     Problem: Nutrition  Goal: Optimal nutrition therapy  Outcome: Ongoing

## 2020-02-04 NOTE — PROGRESS NOTES
Technician at bedside fitting patient for brace.  Electronically signed by Car Nicolas RN on 2/4/2020 at 3:32 PM

## 2020-02-04 NOTE — PRE SEDATION
Lorie Santos MD   aspirin 81 MG chewable tablet CHEW AND SWALLOW 1 TABLET BY MOUTH DAILY 1/13/20  Yes Lorie Santos MD   furosemide (LASIX) 40 MG tablet Take 1 tablet by mouth daily 12/5/19  Yes Lorie Santos MD   potassium chloride (KLOR-CON M) 20 MEQ extended release tablet Take 1 tablet by mouth daily 12/5/19  Yes Lorie Santos MD   lisinopril (PRINIVIL;ZESTRIL) 40 MG tablet Take 1 tablet by mouth daily 7/12/18  Yes Lorie Santos MD   methocarbamol (ROBAXIN) 500 MG tablet Take 1 tablet by mouth 2 times daily 1/29/20   Adelfo Preston PA-C     Coumadin Use Last 7 Days:  no  Antiplatelet drug therapy use last 7 days: no  Other anticoagulant use last 7 days: no  Additional Medication Information:        Pre-Sedation Documentation and Exam:   I have reviewed the patient's history and review of systems.     Mallampati Airway Assessment:  Mallampati Class II - (soft palate, fauces & uvula are visible)    Prior History of Anesthesia Complications:   none    ASA Classification:  Class 2 - A normal healthy patient with mild systemic disease    Sedation/ Anesthesia Plan:   intravenous sedation    Medications Planned:   midazolam (Versed) intravenously and fentanyl intravenously    Patient is an appropriate candidate for plan of sedation: yes    Electronically signed by Magdaleno Martinez MD on 2/4/2020 at 9:56 AM

## 2020-02-04 NOTE — PROGRESS NOTES
Physical Therapy  HOLD NOTE  Kyler Oas    Will defer treatment today and wait for TLSO brace to arrive and be fitted. The pt had a kyphoplasty this date. If pt. is D/C'd prior to next visit please refer back to last daily progress note for D/C status.   Electronically signed by Diallo Sampson PT on 2/4/2020 at 2:23 PM

## 2020-02-04 NOTE — PROGRESS NOTES
Call to [facility] regarding TLSO brace. Faxed nursing order and face sheet to [phone].

## 2020-02-04 NOTE — PROGRESS NOTES
38.2 02/04/2020    MCV 92.2 02/04/2020     02/04/2020    LYMPHOPCT 7.5 02/04/2020    RBC 4.14 02/04/2020    MCH 31.1 02/04/2020    MCHC 33.8 02/04/2020    RDW 13.6 02/04/2020       Lab Results   Component Value Date    CREATININE 1.0 02/04/2020    BUN 21 (H) 02/04/2020     02/04/2020    K 4.5 02/04/2020    CL 99 02/04/2020    CO2 22 02/04/2020       Lab Results   Component Value Date    MG 1.70 01/10/2018       Lab Results   Component Value Date    ALT 13 02/01/2020    AST 18 02/01/2020    ALKPHOS 125 02/01/2020    BILITOT 0.3 02/01/2020        No flowsheet data found. Imaging:  IR KYPHOPLASTY THORACIC/LUMBAR EACH ADDL VERTEBRAL BODY   Final Result   Successful balloon kyphoplasty at T11 and T12         MRI LUMBAR SPINE WO CONTRAST   Final Result   Acute compression fracture at the superior endplate of H96 with abnormal bone   marrow signal and 25% height loss. Acute compression fracture at the superior endplate of H11 with abnormal bone   marrow signal and 25% height loss. 2 mm retropulsion of posterior cortex. Acute to subacute on chronic compression fracture at the anterior aspect of   the superior endplate of L1 with minimal abnormal bone marrow signal and 20%   height loss. Multilevel degenerative disc disease as described above, exacerbating   congenitally narrow lumbar spinal canal.      Spinal canal narrowing, moderate to severe at L3-4, mild to moderate at L2-3,   mild at T12-L1 and L1-2, minimal at T11-12 and L4-5. Foraminal narrowing, moderate to severe at right L3-4 and right L4-5,   moderate at left L3-4 and left L5-S1, mild to moderate at left L4-5 and right   L5-S1, mild at left T12-L1, bilateral L1-2 and bilateral L2-3. The results were sent to radiology results communication. XR CHEST PORTABLE   Final Result   No acute pulmonary disease.              Assessment & Plan:        T11 and T12 compression fracture  -Ortho consulted, Neurosurg consulted  -

## 2020-02-05 NOTE — PROGRESS NOTES
noted.   M/s: Within normal limits, ambulating well    LABS:    Lab Results   Component Value Date    WBC 17.5 (H) 02/05/2020    HGB 12.0 02/05/2020    HCT 35.7 (L) 02/05/2020    MCV 91.8 02/05/2020     02/05/2020    LYMPHOPCT 9.8 02/05/2020    RBC 3.89 (L) 02/05/2020    MCH 30.8 02/05/2020    MCHC 33.6 02/05/2020    RDW 13.9 02/05/2020       Lab Results   Component Value Date    CREATININE 1.6 (H) 02/05/2020    BUN 43 (H) 02/05/2020     02/05/2020    K 4.3 02/05/2020    K 4.3 02/05/2020    CL 98 (L) 02/05/2020    CO2 25 02/05/2020       Lab Results   Component Value Date    MG 1.70 01/10/2018       Lab Results   Component Value Date    ALT 13 02/01/2020    AST 18 02/01/2020    ALKPHOS 125 02/01/2020    BILITOT 0.3 02/01/2020        No flowsheet data found. Imaging:  IR KYPHOPLASTY THORACIC/LUMBAR EACH ADDL VERTEBRAL BODY   Final Result   Successful balloon kyphoplasty at T11 and T12         MRI LUMBAR SPINE WO CONTRAST   Final Result   Acute compression fracture at the superior endplate of N34 with abnormal bone   marrow signal and 25% height loss. Acute compression fracture at the superior endplate of D57 with abnormal bone   marrow signal and 25% height loss. 2 mm retropulsion of posterior cortex. Acute to subacute on chronic compression fracture at the anterior aspect of   the superior endplate of L1 with minimal abnormal bone marrow signal and 20%   height loss. Multilevel degenerative disc disease as described above, exacerbating   congenitally narrow lumbar spinal canal.      Spinal canal narrowing, moderate to severe at L3-4, mild to moderate at L2-3,   mild at T12-L1 and L1-2, minimal at T11-12 and L4-5. Foraminal narrowing, moderate to severe at right L3-4 and right L4-5,   moderate at left L3-4 and left L5-S1, mild to moderate at left L4-5 and right   L5-S1, mild at left T12-L1, bilateral L1-2 and bilateral L2-3.       The results were sent to radiology results communication. XR CHEST PORTABLE   Final Result   No acute pulmonary disease. Assessment & Plan:      WANG on CKD stage III  -Likely related to poor intake and prerenal in nature  -Creatinine on arrival 2.3>1.6>1.1>0.8<1.0<1.6  -Creatinine worse overnight up to 1.6 as well as an elevated BUN from 21-43.  -Per her I's and O's she has had a total of 240 mL all day yesterday. She also has documented 650 mL of urine.  -Strict I's and O's ordered every 4 hours  -Nursing communication placed for encouraging p.o. intake, this was also discussed with nursing today.  -Lisinopril was stopped. Accelerated hypertension   -Please check vitals every 4 hours, order placed  -Holding lisinopril for now  -We will likely need another day joint will add Norvasc if blood pressure still elevated    T11 and T12 compression fracture  - MRI reviewed  -Continue Lidoderm, decreasing pain medications  -PT and OT continue therapy recommending S level 3 home care with 24-hour supervision. -TLSO brace ordered and at bedside, PT and OT worked with patient regarding using this at home.  -Fractures likely pathological and related to osteoporosis, added on vitamin D supplementation but will likely need DEXA scan as an outpatient and discussion with PCP regarding medications.  -Plasty performed on 2/4/2020.     Urinary retention  -Likely a result of pain?  -Creatinine remained stable at this time  -Straight cath x1 and monitor-has been able to void since this time  -Monitor closely  -Resolved and able to use restroom without further difficulty    Vitamin D deficiency  -Does have CKD and needs to be worked up for secondary hyperparathyroidism -started on supplements  -Check PTH level-reviewed and normal    Acute metabolic encephalopathy  -Thought to be related to pain medication prescribed with increase sedation  -Continue tramadol for pain control  -resolved    Troponin  -Likely related to WANG  -No chest pain  -no further work up    Other comorbidities:   Moderate protein calorie malnutrition  CAD s/p PCI   Hyperlipidemia, unspecified  Severe aortic stenosis- s/p TAVR      Body mass index is 17.74 kg/m². The patient and / or the family were informed of the results of any tests, a time was given to answer questions, a plan was proposed and they agreed with plan.     DVT prophylaxis: [] Lovenox  [x] SQ Heparin  [] SCDs because of  [] warfarin/oral direct thrombin inhibitor [] Encourage ambulation    GI prophylaxis: [] PPI/[id number]  [x] not indicated    Probiotic if on abx: [] Yes [] No [x] Not Indicated    Diet: DIET GENERAL;    Consults:  IP CONSULT TO HOSPITALIST  IP CONSULT TO NEUROSURGERY    Disposition:  [] Home  [] Home with home health [] Rehab [] Psych [] SNF  [] LTAC  [] Long term nursing home or group home [] Transfer to ICU  [] Transfer to PCU [] Other:    Code Status: Full Code    ELOS: tbd      ASHLY Barajas - NANCY  02/05/20

## 2020-02-05 NOTE — PROGRESS NOTES
Physical Therapy    Facility/Department: [address] MED SURG  Daily Note  If pt. is D/C'd prior to next visit please refer back to last daily progress note for D/C status. NAME: Zhang Butts  : 1930  MRN: [id number]    Date of Service: 2020    Discharge Recommendations:  Home with Home health PT, S Level 3, 24 hour supervision or assist, Patient would benefit from continued therapy after discharge, 3-5 sessions per week(EITHER home with 24/7 care and home PT, level 3 OR 3-5 sessions per week at a facility)   PT Equipment Recommendations  Equipment Needed: Yes  Mobility Devices: Martene Bunting: Rolling  Other: she will need a wheeled walker IF she goes directly home    Assessment   Body structures, Functions, Activity limitations: Decreased functional mobility ; Decreased strength;Decreased safe awareness;Decreased cognition;Decreased endurance;Decreased balance  Assessment: 81 y/o female admit 2020 with Acute Renal Failure, Elevated Troponin. Recent ER 2020, CT reveal Compress Fxs T12 & L1.  PMH also including CAD, Angio with Stent, Aortic Stenosis, TAVR, CHF, Back Surg. The pt had a kyphoplasty on T11 and T12 on 2020. PTA pt reports living alone in own home with steps to enter and 1st floor bed/bath. Pt reports independent daily care and functional mobility; reports dtr comes over daily to provide assist/support. Today, the pt was very distractable and needing cues to complete tasks and for directions. She was mod A for bed mobility via logroll, CGA for transfers  and CGA/min A for ambulation with the walker in the room. The pt was dependent for TLSO placement. The pt is functioning well below a safe level where she could function at home alone. The pt does not want to go to a facility but is unsure if dgt could stay with her 24/7. Anticipate that the pt will improve with con't skilled PT but she will need time and assist while she is healing.  The pt could potentially go home if she had 24/7 assist  and home PT, level 3. If this level of assist not available then she will need moderate paced therapy services at 3-5 sessions/week. Will con't to follow. Prognosis: Good  PT Education: PT Role;General Safety; Adaptive Device Training; Injury Prevention  Barriers to Learning: Cognitive, Safety Awareness concerns. REQUIRES PT FOLLOW UP: Yes  Activity Tolerance  Activity Tolerance: Patient limited by cognitive status       Patient Diagnosis(es): The primary encounter diagnosis was Acute renal failure, unspecified acute renal failure type ([address] Diagnoses of Dehydration, Elevated troponin, and Hyperkalemia were also pertinent to this visit. has a past medical history of Anxiety, Aortic stenosis, severe, CAD (coronary artery disease), CHF (congestive heart failure) ([address] Hyperlipidemia, and Hypertension. has a past surgical history that includes Cholecystectomy; back surgery; Tonsillectomy; Coronary angioplasty with stent (12/2017); Aortic valve surgery (01/2018); and Fixation Kyphoplasty (Bilateral, 02/04/2020). Restrictions  Restrictions/Precautions  Restrictions/Precautions: Fall Risk  Position Activity Restriction  Spinal Precautions: No Bending, No Lifting, No Twisting  Other position/activity restrictions: Recent kyphoplasty T11 and T12, TLSO PRN for pain management    Subjective  General  Chart Reviewed: Yes  Patient assessed for rehabilitation services?: Yes  Additional Pertinent Hx: 79 y/o female admit 2/1/2020 with Acute Renal Failure, Elevated Troponin. Recent ER 1/29/2020, CT reveal Compress Fxs T12 & L1. The pt had a kyphoplasty of T11 and T12 on 2-4-2020. PMH also including CAD, Angio with Stent, Aortic Stenosis, TAVR, CHF, Back Surg. Response To Previous Treatment: Patient with no complaints from previous session. Family / Caregiver Present: No  Referring Practitioner: Dr. Fletcher Lassiter. Diagnosis: Acute Renal Failure, Elevated Troponin.   compression fx's  General safety  Problem Solving: Decreased awareness of errors;Assistance required to generate solutions;Assistance required to implement solutions  Insights: Decreased awareness of deficits  Initiation: Requires cues for some  Sequencing: Requires cues for some    Objective  Bed mobility  Supine to Sit: Moderate assistance(flat bed, no HR, log technique )  Scooting: Stand by assistance  Transfers  Sit to Stand: Contact guard assistance  Stand to sit: Contact guard assistance  Ambulation  Ambulation?: Yes  Ambulation 1  Surface: level tile  Device: Rolling Walker  Assistance: Contact guard assistance;Minimal assistance  Quality of Gait: slight assist needed for managment of the walker, slight posterior lean at times  Distance: 25 feet x 2  Comments: TLSO brace on for mobiliuty per pt request  Stairs/Curb  Stairs?: No     Balance  Posture: Fair  Sitting - Static: Good  Sitting - Dynamic: Good;-  Standing - Static: Fair;+  Standing - Dynamic: Good;-(with walker)  Comments: stood at the sink x 7-8 minutes with CGA/min A for balance, no increase c/o pf pain with standing  Other activities: the pt used the bathroom and stood at the sink for grooming and hand hygiene after using the commode; dependent for placement of the TLSO brace    Plan   Plan  Times per week: 3-5x week while in acute care setting.    Current Treatment Recommendations: Strengthening, Balance Training, Functional Mobility Training, Transfer Training, Gait Training, Safety Education & Training, Patient/Caregiver Education & Training  Safety Devices  Type of devices: Call light within reach, Nurse notified, Chair alarm in place, Patient at risk for falls, Left in chair, Gait belt(SKYLAR Arguello notified)      AM-PAC Score  AM-PAC Inpatient Mobility Raw Score : 17 (02/05/20 1117)  AM-PAC Inpatient T-Scale Score : 42.13 (02/05/20 1117)  Mobility Inpatient CMS 0-100% Score: 50.57 (02/05/20 1117)  Mobility Inpatient CMS G-Code Modifier : CK (02/05/20 1117)

## 2020-02-05 NOTE — CARE COORDINATION
Met with daughter Alejandrina Wells at bedside. Provided snf listing and she has selected [facility], [facility] and [facility] ecf. Info has been sent to all. Will await ecf decision and assist as needed.    Electronically signed by PHILLIP Elizabeth on 2/5/2020 at 5:36 PM

## 2020-02-05 NOTE — PLAN OF CARE
Problem: Falls - Risk of:  Goal: Will remain free from falls  Description  Will remain free from falls  2/5/2020 1004 by Chad Vieira RN  Outcome: Ongoing     Problem: Falls - Risk of:  Goal: Absence of physical injury  Description  Absence of physical injury  2/5/2020 1004 by Chad Vieira RN  Outcome: Ongoing     Problem: Risk for Impaired Skin Integrity  Goal: Tissue integrity - skin and mucous membranes  Description  Structural intactness and normal physiological function of skin and  mucous membranes.   2/5/2020 1004 by Chad Vieira RN  Outcome: Ongoing     Problem: Pain:  Goal: Pain level will decrease  Description  Pain level will decrease  2/5/2020 1004 by Chad Vieira RN  Outcome: Ongoing     Problem: Pain:  Goal: Control of acute pain  Description  Control of acute pain  2/5/2020 1004 by Chad Vieira RN  Outcome: Ongoing     Problem: Pain:  Goal: Control of chronic pain  Description  Control of chronic pain  2/5/2020 1004 by Chad Vieira RN  Outcome: Ongoing     Problem: Nutrition  Goal: Optimal nutrition therapy  2/5/2020 1004 by Chad Vieira RN  Outcome: Ongoing

## 2020-02-05 NOTE — PROGRESS NOTES
wasting outside of scope of normal aging   · Wound Type: Pressure Ulcer, Unstageable  · Current Nutrition Therapies:  · Oral Diet Orders: General   · Oral Diet intake: Unable to assess  · Oral Nutrition Supplement (ONS) Orders: None  · ONS intake: Unable to assess  · Anthropometric Measures:  · Ht: 5' (152.4 cm)   · Current Body Wt: 90 lb 13.3 oz (41.2 kg)  · Admission Body Wt:    · Usual Body Wt: 100 lb (45.4 kg)  · % Weight Change:  ,  appears stable   · Ideal Body Wt: (SAMUEL, no ht on file ), % Ideal Body    · Adjusted Body Wt:  , body weight adjusted for    · BMI Classification: BMI <18.5 Underweight    Nutrition Interventions:   Continue current diet, Discontinue ONS  Continued Inpatient Monitoring, Education Completed    Nutrition Evaluation:   · Evaluation: No progress toward goals   · Goals:  Tolerate diet and consume greater than 50% of meals and supplements this admission     · Monitoring: Meal Intake, Wound Healing, Chewing/Swallowing, Monitor Bowel Function, Weight      Electronically signed by Pete Mccormack RD, LD on 2/5/20 at 12:15 PM    Contact Number: [phone]

## 2020-02-05 NOTE — PROGRESS NOTES
Occupational Therapy  Facility/Department: [address] MED SURG  Daily Treatment Note  Let this serve as discharge note if patient is discharged prior to next OT session    NAME: Josefina Lora  : 1930  MRN: [id number]    Date of Service: 2020    Discharge Recommendations:  3-5 sessions per week, 24 hour supervision or assist, S Level 3  (first recommendation is 3-5 sessions per week, yet when discussed with patient, patient is adamant about d/c to home, if patient returns home she will require 24 hour supervision/assist and level 3 HHOT, patient is high risk for failure without increased supervision/assist)    OT Equipment Recommendations  Other: may need RW, TSF if returns home     Felix Oneal scored a  on the AM-PAC ADL Inpatient form. Current research shows that an AM-PAC score of 17 or less is typically not associated with a discharge to the patient's home setting. Based on the patients AM-PAC score and their current ADL deficits, it is recommended that the patient have 3-5 sessions per week of Occupational Therapy at d/c to increase the patients independence. Assessment   Performance deficits / Impairments: Decreased functional mobility ; Decreased cognition;Decreased high-level IADLs;Decreased ADL status; Decreased endurance;Decreased strength;Decreased balance;Decreased posture;Decreased safe awareness  Assessment: Patient is limited by decreased activity tolerance and decreased cognition. Patient is requiring cues for safety awareness, sequencing of tasks, and problem solving. Patient also seeing water dripping from the ceiling and bags on the couch, which none of this was present. Patient has posterior LOB in standing at times requiring Min A for standing balance. Cont per POC.    Treatment Diagnosis: impaired ADLs, t/f, fxl mob  Prognosis: Good  OT Education: OT Role;Plan of Care;Precautions;Transfer Training;ADL Adaptive Strategies  Patient Education: educated pt on Attends with cues to redirect  Memory: Decreased short term memory;Decreased recall of precautions;Decreased recall of recent events  Safety Judgement: Decreased awareness of need for assistance;Decreased awareness of need for safety  Problem Solving: Decreased awareness of errors;Assistance required to generate solutions;Assistance required to implement solutions  Insights: Decreased awareness of deficits  Initiation: Requires cues for some  Sequencing: Requires cues for some  Cognition Comment: patient seeing water dripping from the ceiling, seeing bags on the couch (none of this present)     Plan  Times per week: 3-5 sessions  Times per day: Daily  Current Treatment Recommendations: Strengthening, Pain Management, Gait Training, Safety Education & Training, Balance Training, Patient/Caregiver Education & Training, Self-Care / ADL, Functional Mobility Training, Equipment Evaluation, Education, & procurement, Home Management Training, Endurance Training  Plan Comment: recommend continued OT services upon DC    Goals  Short term goals  Time Frame for Short term goals: by 3-5 days pt will complete: status as of 2/5/20: goals ongoing   Short term goal 1: feeding & grooming tasks IND'ly  Short term goal 2: UB dressing IND'ly  Short term goal 3: LB dressing w/ SBA using BLT prec & A/E  Short term goal 4: toilet tasks w/ SBA  Short term goal 5: household transfers w/ SBA using RW  Short term goal 6: complete caregiver training and determine DME needs prior to DC  Patient Goals   Patient goals : \" I need to get stronger so I can go home tomorrow\"--she is not safe to return home unless provided w/ 24 hr care at this time, recommend continued OT services to improve ADLs, t/f, balance & strength       Therapy Time   Individual Concurrent Group Co-treatment   Time In       1039   Time Out       1122   Minutes       43   Timed Code Treatment Minutes: [facility] [address] 140

## 2020-02-06 NOTE — DISCHARGE INSTR - COC
2 diabetes mellitus ([facility] [address] E11.21    Aortic valve stenosis I35.0    Nonrheumatic aortic valve stenosis I35.0    Coronary artery disease involving native coronary artery of native heart with angina pectoris ([facility]) I25.119    Coronary artery disease involving native coronary artery of native heart without angina pectoris I25.10    S/P TAVR (transcatheter aortic valve replacement) Z95.2    Mixed hyperlipidemia E78.2    Hypercholesteremia E78.00    Acute renal failure (HCC) N17.9    Dehydration E86.0    Acute prerenal azotemia R79.89    Closed fracture of first lumbar vertebra ([facility]) S32.019A       Isolation/Infection:   Isolation          No Isolation        Patient Infection Status     None to display          Nurse Assessment:  Last Vital Signs: /64   Pulse 55   Temp 97.5 °F (36.4 °C) (Oral)   Resp 16   Ht 5' (1.524 m)   Wt 93 lb 0.6 oz (42.2 kg)   SpO2 96%   BMI 18.17 kg/m²     Last documented pain score (0-10 scale): Pain Level: 7  Last Weight:   Wt Readings from Last 1 Encounters:   02/06/20 93 lb 0.6 oz (42.2 kg)     Mental Status:  oriented and alert    IV Access:  - None    Nursing Mobility/ADLs:  Walking   Assisted  Transfer  Assisted  Bathing  Assisted  Dressing  Assisted  Toileting  Assisted  Feeding  Assisted  Med Admin  Assisted  Med Delivery   crushed, drinks water to wash down     Wound Care Documentation and Therapy:  Wound 11/27/17 Coccyx Mid (Active)   Number of days: 801       Wound 02/01/20 Buttocks Left;Right 2 small unstageable healing ulcers (Active)   Wound Pressure Unstageable 2/4/2020  8:30 PM   Dressing Changed Changed/New 2/1/2020  6:00 AM   Dressing/Treatment Other (comment) 2/1/2020  6:00 AM   Dressing Change Due 02/04/20 2/1/2020  6:00 AM   Wound Assessment Dry;Red 2/1/2020  6:00 AM   Drainage Amount Scant 2/1/2020  6:00 AM   Number of days: 5        Elimination:  Continence:   · Bowel:  Yes  · Bladder: Yes  Urinary Catheter: None   Colostomy/Ileostomy/Ileal Conduit: No       Date of Last BM: 2/1    Intake/Output Summary (Last 24 hours) at 2/6/2020 1238  Last data filed at 2/6/2020 1220  Gross per 24 hour   Intake 1460 ml   Output 1150 ml   Net 310 ml     I/O last 3 completed shifts: In: 5570 [P.O.:1220]  Out: 1 [Urine:970]    Safety Concerns: At Risk for Falls    Impairments/Disabilities:      None    Nutrition Therapy:  Current Nutrition Therapy:   - Oral Diet:  General    Routes of Feeding: Oral  Liquids: No Restrictions  Daily Fluid Restriction: no  Last Modified Barium Swallow with Video (Video Swallowing Test): not done    Treatments at the Time of Hospital Discharge:   Respiratory Treatments: none  Oxygen Therapy:  is not on home oxygen therapy.   Ventilator: N/A    Rehab Therapies: Physical Therapy and Occupational Therapy  Weight Bearing Status/Restrictions: No weight bearing restirctions  Other Medical Equipment (for information only, NOT a DME order):  walker  Other Treatments: none    Patient's personal belongings (please select all that are sent with patient):  None    RN SIGNATURE:  Electronically signed by Richard Mantilla RN on 2/6/20 at 3:11 PM    CASE MANAGEMENT/SOCIAL WORK SECTION    Inpatient Status Date: ***    Readmission Risk Assessment Score:  Readmission Risk              Risk of Unplanned Readmission:        15           Discharging to Facility/ Agency   · Name:   · Address:  · Phone:  · Fax:    Dialysis Facility (if applicable)   · Name:  · Address:  · Dialysis Schedule:  · Phone:  · Fax:    / signature: {Esignature:[id number]}    PHYSICIAN SECTION    Prognosis: Good    Condition at Discharge: Stable    Rehab Potential (if transferring to Rehab): Good    Recommended Labs or Other Treatments After Discharge: pt/ot, BMP 3 days to follow up on Cr, back brace on when OOB to assist with pain, you have to encourage her to drink d/t ishmael    Physician Certification: I certify the above information and transfer of Sarah Zuniga is necessary for the continuing treatment of the diagnosis listed and that she requires [place] for less 30 days.      Update Admission H&P: No change in H&P    PHYSICIAN SIGNATURE:  Electronically signed by ASHLY Carballo CNP on 2/6/20 at 12:39 PM/Dr. Sander Blanton

## 2020-02-06 NOTE — DISCHARGE SUMMARY
Hospital Medicine Discharge Summary      Patient ID: Kirsten Martinez      Patient's PCP: [address],     Admit Date: 1/31/2020     Discharge Date:   02/06/20     Admitting Physician: Leonid Stevenson MD     Discharge Provider: ASHLY Tyler CNP     Consults: IR, Ortho    Discharge Diagnoses: Active Hospital Problems    Diagnosis Date Noted    Acute renal failure ([facility] [address] [N17.9] 02/01/2020    Dehydration [E86.0] 02/01/2020    Acute prerenal azotemia [R79.89] 02/01/2020    Closed fracture of first lumbar vertebra ([facility] [address] [S32.019A] 02/01/2020    Mixed hyperlipidemia [E78.2] 02/15/2018    Coronary artery disease involving native coronary artery of native heart without angina pectoris [I25.10] 11/27/2017    Essential hypertension [I10] 10/10/2016       Disposition:  [] Home  [] Home with home health [] Rehab [] Psych [x] SNF  [] LTAC  [] Long term nursing home or group home [] Transfer to ICU  [] Transfer to PCU [] Other:    Ben Swift DO  [id number] [place] [address]  [phone]             Discharge Condition: stable      Code Status:  Full Code     Activity: activity as tolerated    Diet: DIET GENERAL;     Discharge Medications:     Current Discharge Medication List           Details   traMADol (ULTRAM) 50 MG tablet Take 0.5 tablets by mouth every 8 hours as needed for Pain for up to 3 days.   Qty: 6 tablet, Refills: 0    Comments: Reduce doses taken as pain becomes manageable  Associated Diagnoses: Compression fracture of T12 vertebra, initial encounter ([facility])      amLODIPine (NORVASC) 5 MG tablet Take 1 tablet by mouth daily  Qty: 30 tablet, Refills: 3      lidocaine 4 % external patch Place 1 patch onto the skin daily  Qty: 30 patch, Refills: 0      vitamin D (ERGOCALCIFEROL) 1.25 MG (32062 UT) CAPS capsule Take 1 capsule by mouth once a week  Qty: 10 capsule, Refills: 0              Details   acetaminophen (TYLENOL) 500 MG tablet Take 2 tablets by 10.5 02/06/2020    HGB 12.3 02/06/2020    HCT 36.0 02/06/2020     02/06/2020       Renal:    Lab Results   Component Value Date     02/06/2020    K 3.9 02/06/2020    CL 99 02/06/2020    CO2 27 02/06/2020    BUN 42 02/06/2020    CREATININE 1.1 02/06/2020    CALCIUM 9.1 02/06/2020    PHOS 3.4 02/05/2020       Future Appointments   Date Time Provider [facility]   3/5/2020  9:00 AM Geovanny Moses MD [facility] Cardio [facility]       Time Spent on discharge is more than 30 minutes in the examination, evaluation, counseling and review of medications and discharge plan. RX monitoring reviewed    Signed:    ASHLY Roberson - CNP   2/6/2020      Thank you Jeremie Mott DO for the opportunity to be involved in this patient's care. If you have any questions or concerns please feel free to contact me at [phone].

## 2020-02-06 NOTE — PLAN OF CARE
Report given to [facility], all questions answered at this time.  Electronically signed by Cong Stern RN on 2/6/2020 at 4:23 PM

## 2020-02-06 NOTE — PROGRESS NOTES
Occupational Therapy  Facility/Department: [address] MED SURG  Daily Treatment Note  Let this serve as discharge note if patient is discharged prior to next OT session    NAME: Josefina Lora  : 1930  MRN: [id number]    Date of Service: 2020    Discharge Recommendations:  3-5 sessions per week    Felix Oneal scored a 17/24 on the AM-PAC ADL Inpatient form. Current research shows that an AM-PAC score of 17 or less is typically not associated with a discharge to the patient's home setting. Based on the patients AM-PAC score and their current ADL deficits, it is recommended that the patient have 3-5 sessions per week of Occupational Therapy at d/c to increase the patients independence. Assessment   Performance deficits / Impairments: Decreased functional mobility ; Decreased cognition;Decreased high-level IADLs;Decreased ADL status; Decreased endurance;Decreased strength;Decreased balance;Decreased posture;Decreased safe awareness  Assessment: Patient tolerated session well, despite c/os of back pain throughout session. Patient completed ADL tasks, transfers, and fxl mobility with CGA, yet continues to require cues for safety and sequencing. Patient requires redirection throughout session as well. Patient remains limited by back precautions, pain, and cognition. Will need to continue to reinforce back precautions with daily tasks. Cont per POC. Treatment Diagnosis: impaired ADLs, t/f, fxl mob  Prognosis: Good  OT Education: OT Role;Plan of Care;Precautions;Transfer Training;ADL Adaptive Strategies; Family Education  REQUIRES OT FOLLOW UP: Yes  Activity Tolerance: Patient Tolerated treatment well  Safety Devices in place: Yes  Type of devices: Call light within reach; Chair alarm in place; Left in chair;Nurse notified(SKYLAR Johns notified )       Patient Diagnosis(es): The primary encounter diagnosis was Acute renal failure, unspecified acute renal failure type ([facility] [address]  Diagnoses of Dehydration, Elevated troponin, Hyperkalemia, Congestive cardiomyopathy ([address] and Compression fracture of T12 vertebra, initial encounter [facility]) were also pertinent to this visit. has a past medical history of Anxiety, Aortic stenosis, severe, CAD (coronary artery disease), CHF (congestive heart failure) ([address] Hyperlipidemia, and Hypertension. has a past surgical history that includes Cholecystectomy; back surgery; Tonsillectomy; Coronary angioplasty with stent (12/2017); Aortic valve surgery (01/2018); and Fixation Kyphoplasty (Bilateral, 02/04/2020). Restrictions  Restrictions/Precautions: Fall Risk  Spinal Precautions: No Bending, No Lifting, No Twisting  Other position/activity restrictions: Recent kyphoplasty T11 and T12, TLSO PRN for pain management    Subjective  Chart Reviewed: Yes  Patient assessed for rehabilitation services?: Yes  Additional Pertinent Hx: per Dr Cintia Marcelino H&P note on 2/1/20:\"HISTORY OF PRESENT ILLNESS: Patient is an 63-year-old woman with a history of hypertension, hyperlipidemia and coronary artery disease who presents to the emergency room following a 2 day history of not eating or drinking. She initially presented to this emergency room two days ago and was found to have an L1 fracture and was discharged with a muscle relaxer and pain medications. Since that time she has mainly been sleeping but has had little or no oral intake. In the emergency room she was found to have dehydration, azotemia and acute renal failure. She is being admitted for further evaluation and treatment.  Associated signs and symptoms do not include numbness, tingling, weakness, saddle anesthesia, bowel or bladder dysfunction or rash\"--PM hx: age-indeterminate L1 & T12 compression fx's, HTN, HLD, CAD, ARF  Response to previous treatment: Patient with no complaints from previous session  Family / Caregiver Present: Yes(daughter present )  Referring Practitioner: FERCHO  Diagnosis: dehydration, ARF; procurement, Home Management Training, Endurance Training  Plan Comment: recommend continued OT services upon DC    Goals  Short term goals  Time Frame for Short term goals: by 3-5 days pt will complete: status as of 2/6/20: goals ongoing   Short term goal 1: feeding & grooming tasks IND'ly  Short term goal 2: UB dressing IND'ly  Short term goal 3: LB dressing w/ SBA using BLT prec & A/E  Short term goal 4: toilet tasks w/ SBA  Short term goal 5: household transfers w/ SBA using RW  Short term goal 6: complete caregiver training and determine DME needs prior to DC  Patient Goals   Patient goals : \" I need to get stronger so I can go home tomorrow\"--she is not safe to return home unless provided w/ 24 hr care at this time, recommend continued OT services to improve ADLs, t/f, balance & strength       Therapy Time   Individual Concurrent Group Co-treatment   Time In 1316         Time Out 1350         Minutes 34         Timed Code Treatment Minutes: [facility] [address] 140

## 2020-02-18 PROBLEM — E55.9 HYPOVITAMINOSIS D: Status: ACTIVE | Noted: 2020-01-01

## 2020-02-18 PROBLEM — M80.00XA AGE-RELATED OSTEOPOROSIS WITH CURRENT PATHOLOGICAL FRACTURE: Status: ACTIVE | Noted: 2020-01-01

## 2020-02-18 PROBLEM — S22.000A COMPRESSION FRACTURE OF BODY OF THORACIC VERTEBRA (HCC): Status: ACTIVE | Noted: 2020-01-01

## 2020-02-18 NOTE — PROGRESS NOTES
Tenderness: There is no abdominal tenderness. Musculoskeletal:      Comments: Back pain to palp   Skin:     Findings: No rash. Neurological:      General: No focal deficit present. Mental Status: She is alert. Motor: Weakness present. Psychiatric:         Mood and Affect: Mood normal.         Thought Content:  Thought content normal.         Assessment:      Assessment/Plan:  Ginger Washington was seen today for follow-up from hospital.    Diagnoses and all orders for this visit:    Hospital discharge follow-up  -     LA DISCHARGE MEDS RECONCILED W/ CURRENT OUTPATIENT MED LIST    Age-related osteoporosis with current pathological fracture, initial encounter  -     DEXA BONE DENSITY AXIAL SKELETON  -     LA DISCHARGE MEDS RECONCILED W/ CURRENT OUTPATIENT MED LIST    Acute renal failure, unspecified acute renal failure type ([facility] [address]  -     LA DISCHARGE MEDS RECONCILED W/ CURRENT OUTPATIENT MED LIST    Compression fracture of body of thoracic vertebra (HCC)  -     LA DISCHARGE MEDS RECONCILED W/ CURRENT OUTPATIENT MED LIST    Dehydration    Diabetic nephropathy associated with type 2 diabetes mellitus ([facility] [address]    Coronary artery disease involving native coronary artery of native heart with angina pectoris (HCC)    Hypovitaminosis D            Plan:       Cont meds   cont pt ot     rto 4-6 week        [facility],

## 2020-03-02 PROBLEM — E86.0 DEHYDRATION: Status: RESOLVED | Noted: 2020-01-01 | Resolved: 2020-01-01

## 2020-03-04 PROBLEM — I25.10 CORONARY ARTERY DISEASE DUE TO LIPID RICH PLAQUE: Status: ACTIVE | Noted: 2020-01-01

## 2020-03-04 PROBLEM — I25.83 CORONARY ARTERY DISEASE DUE TO LIPID RICH PLAQUE: Status: ACTIVE | Noted: 2020-01-01

## 2020-03-06 NOTE — TELEPHONE ENCOUNTER
[facility] requesting verbal order to see patient 2week4 for PT. Please return call.

## 2020-03-12 NOTE — CARE COORDINATION
[facility] Transitions Follow Up Call    3/12/2020    Patient: Grant Clarke  Patient : 1930   MRN: [id number]  Reason for Admission  Discharge Date: 20 RARS: Readmission Risk Score: 17         Spoke with: Yuli  Patient is doing well since she has been home. She is very happy to be back in her home. She has everything she needs. Care Transitions Subsequent and Final Call    Subsequent and Final Calls  Do you have any ongoing symptoms?:  No  Have your medications changed?:  No  Do you have any questions related to your medications?:  No  Do you currently have any active services?:  Yes  Are you currently active with any services?:  Home Health  Do you have any needs or concerns that I can assist you with?:  No  Identified Barriers:  None  Care Transitions Interventions  Other Interventions:             Follow Up  Future Appointments   Date Time Provider Sherry Thomson   3/17/2020  1:15 PM Win Renee DO [facility]   3/19/2020  3:30 PM Kenzie Issa MD  Cardio [facility]       Clyde Easley LPN

## 2020-03-23 NOTE — TELEPHONE ENCOUNTER
Jorje Simpler requesting a med refill on Vitamin D 1.25 mg capsules to go to [place]. [facility] was last to prescribe. Last office visit 2/18/20.

## 2020-04-15 NOTE — TELEPHONE ENCOUNTER
Isabel Gupta from [facility] calling to let Dr Gracie Alejo know they are discharging pt from [facility] services, she has met her goals

## 2020-07-01 NOTE — PROGRESS NOTES
[address]     H+P // CONSULT // OUTPATIENT VISIT // FOLLOWUP VISIT     Referring Doctor Anna Fry, DO   Encounter Type Followup     CHIEF COMPLAINT     Visit Type Chronic   Symptoms None   Problems AS s/p TAVR, CAD, HTN, CHOL     HISTORY OF PRESENT ILLNESS      GEN - Doing great. No new concerns.  AS - s/p TAVR 1/18. Last echo stable. Denies cp, sob, dizziness, syncope.  CAD - Denies cp, sob, dizziness, syncope, palpitations.  HTN - Ambulatory BP readings in good range. No HA or dizziness.  CHOL - Last cholesterol reviewed and in good range.  MED - Compliant with CV meds listed below without notable side effects     HISTORY/ALLERGIES/ROS     MedHx:  has a past medical history of Anxiety, Aortic stenosis, severe, CAD (coronary artery disease), CHF (congestive heart failure) ([facility] [address] Hyperlipidemia, and Hypertension. SurgHx:  has a past surgical history that includes Cholecystectomy; back surgery; Tonsillectomy; Coronary angioplasty with stent (12/2017); Aortic valve surgery (01/2018); and Fixation Kyphoplasty (Bilateral, 02/04/2020). SocHx:   reports that she has never smoked. She has never used smokeless tobacco. She reports that she does not drink alcohol or use drugs. FamHx: family history includes Cancer in her sister; Other in her mother. Allergies: Patient has no known allergies.    ROS:  [x]Full ROS obtained and negative except as mentioned in HPI     MEDICATIONS      Current Outpatient Medications   Medication Sig Dispense Refill    aspirin 81 MG chewable tablet CHEW AND SWALLOW 1 TABLET BY MOUTH DAILY 30 tablet 11    metoprolol succinate (TOPROL XL) 25 MG extended release tablet TAKE 1 TABLET BY MOUTH DAILY 90 tablet 1    amLODIPine (NORVASC) 5 MG tablet TAKE 1 TABLET BY MOUTH EVERY DAY 90 tablet 1    vitamin D (ERGOCALCIFEROL) 1.25 MG (31415 UT) CAPS capsule Take 1 capsule by mouth once a week 12 capsule 1    acetaminophen (TYLENOL) 500 MG tablet Take 2 compliance with meds/diet/salt/exercise; avoid tob/alc/drugs; patient verbalized understanding  *FOLLOWUP  6 months with echo    [address] Adal[address] Ignacio, am scribing for and in the presence of José Miguel Gregg MD.   SignedAdalid 07/01/20 8:49 AM   Provider Margaret Ricci is working as a scribe for and in the presence of divine Gregg MD). Working as a scribe, Adalid Allan may have prepopulated components of this note with my historical  intellectual property under my direct supervision. Any additions to this intellectual property were performed in my presence and at my direction.   Furthermore, the content and accuracy of this note have been reviewed by divine Gregg MD).  7/2/2020 10:16 AM

## 2020-08-02 NOTE — ED PROVIDER NOTES
I independently performed a history and physical on Enrique Arias. All diagnostic, treatment, and disposition decisions were made by myself in conjunction with the advanced practice provider. I have participated in the medical decision making and directed the treatment plan and disposition of the patient. For further details of Enrique Arias emergency department encounter, please see the advanced practice provider's documentation. CHIEF COMPLAINT  Chief Complaint   Patient presents with    Dehydration     Daughter with pt reporting that she hasn't been eating or drinking much and today wasn't making much sense. Pt reports that she feels weak. Briefly, Enrique Arias is a 80 y.o. female  who presents to the ED complaining of poor p.o. intake over the past few days in particular and apparently was a little confused earlier and feeling generally weak. She is having trouble with ADLs. This is all fairly new for her to feel this way. She denies any focal numbness tingling or weakness though. Her and her daughter also present at the bedside believe that she is just dehydrated. Some nausea but no abdominal pains. FOCUSED PHYSICAL EXAMINATION  BP (!) 138/58   Pulse 66   Temp 97.8 °F (36.6 °C)   Resp 18   Ht 5' (1.524 m)   Wt 87 lb (39.5 kg)   SpO2 99%   BMI 16.99 kg/m²    Focused physical examination notable for no acute distress, well-appearing, well-nourished, normal speech and mentation without obvious facial droop, no obvious rash. No obvious cranial nerve deficits on my initial exam.  Regular rate and rhythm, clear to auscultation bilaterally, dry mucous membranes, abdomen soft nontender nondistended.     The 12 lead EKG was interpreted by me as follows:  Rate: normal with a rate of 62  Rhythm: sinus  Axis: left deviation  Intervals: normal AL, narrow QRS, normal QTc  ST segments: no ST elevations or depressions  T waves: no abnormal inversions  Non-specific T wave changes: not present  Prior EKG comparison: EKG dated 1/31/20 is not significantly different    MDM:  ED course was notable for generalized weakness with dehydration. She does have a leukocytosis but no evidence of an infectious process evident. Patient does have ketonuria and a high BUN to creatinine ratio but no WANG at this time. Patient was given IV fluids but still feels quite generally weak. On CT of the head patient does have a notable sized meningioma although it has benign appearance. [place] was consulted and did not recommend transfer or any further emergent work-up regarding this finding. Patient symptoms are not acutely related to it. Recommended MRI when able. After hospitalist evaluated the patient, they elected to go home. During the patient's ED course, the patient was given:  Medications   0.9 % sodium chloride bolus (0 mLs Intravenous Stopped 8/2/20 2021)   0.9 % sodium chloride bolus (0 mLs Intravenous Stopped 8/2/20 2204)        CLINICAL IMPRESSION  1. General weakness    2. Dehydration    3. Meningioma ([address]        DISPOSITION  Jaden Brady was discharged to home in stable condition. This chart was created using Dragon dictation software. Efforts were made by me to ensure accuracy, however some errors may be present due to limitations of this technology.             Jai March MD  08/02/20 2403       Jai March MD  08/02/20 9591

## 2020-08-02 NOTE — ED PROVIDER NOTES
denies any associated chest pain, cough, fever, short of air, nausea vomiting, diarrhea, leg swelling, weakness, numbness, tingling, headache. Nursing Notes were all reviewed and agreed with or any disagreements were addressed in the HPI. REVIEW OF SYSTEMS    (2-9 systems for level 4, 10 or more for level 5)     Review of Systems    Positives and Pertinent negatives as per HPI. Except as noted above in the ROS, all other systems were reviewed and negative.        PAST MEDICAL HISTORY     Past Medical History:   Diagnosis Date    Anxiety     Aortic stenosis, severe     CAD (coronary artery disease)     CHF (congestive heart failure) (HCC)     Hyperlipidemia     Hypertension          SURGICAL HISTORY     Past Surgical History:   Procedure Laterality Date    AORTIC VALVE SURGERY  01/2018    TAVR,  juan josé clayton vesdaiana    BACK SURGERY      CHOLECYSTECTOMY      CORONARY ANGIOPLASTY WITH STENT PLACEMENT  12/2017    Two Stents    FIXATION KYPHOPLASTY Bilateral 02/04/2020    T11 and T12 - Dr. Kush Tran       Previous Medications    ACETAMINOPHEN (TYLENOL) 500 MG TABLET    Take 2 tablets by mouth every 8 hours for 7 days    AMLODIPINE (NORVASC) 5 MG TABLET    TAKE 1 TABLET BY MOUTH EVERY DAY    ASPIRIN 81 MG CHEWABLE TABLET    CHEW AND SWALLOW 1 TABLET BY MOUTH DAILY    FUROSEMIDE (LASIX) 20 MG TABLET    Take 1 tablet by mouth daily as needed (for weight gain more than 2lbs in 2 days)    ISOSORBIDE MONONITRATE (IMDUR) 30 MG EXTENDED RELEASE TABLET    TAKE 1 TABLET BY MOUTH DAILY    METOPROLOL SUCCINATE (TOPROL XL) 25 MG EXTENDED RELEASE TABLET    TAKE 1 TABLET BY MOUTH DAILY    POTASSIUM CHLORIDE (KLOR-CON M) 20 MEQ EXTENDED RELEASE TABLET    Take 1 tablet by mouth daily as needed (take 1 tablet when she takes lasix)    VITAMIN D (ERGOCALCIFEROL) 1.25 MG (07697 UT) CAPS CAPSULE    Take 1 capsule by mouth once a week         ALLERGIES     Patient has no known allergies. FAMILYHISTORY       Family History   Problem Relation Age of Onset    Other Mother     Cancer Sister           SOCIAL HISTORY       Social History     Tobacco Use    Smoking status: Never Smoker    Smokeless tobacco: Never Used   Substance Use Topics    Alcohol use: No    Drug use: No       SCREENINGS             PHYSICAL EXAM    (up to 7 for level 4, 8 or more for level 5)     ED Triage Vitals [08/02/20 1615]   BP Temp Temp src Pulse Resp SpO2 Height Weight   121/71 97.8 °F (36.6 °C) -- 67 16 96 % 5' (1.524 m) 87 lb (39.5 kg)       Physical Exam  Vitals signs and nursing note reviewed. Constitutional:       General: She is awake. Appearance: Normal appearance. She is well-developed and underweight. HENT:      Head: Normocephalic and atraumatic. Nose: Nose normal.      Mouth/Throat:      Mouth: Mucous membranes are dry. Eyes:      General:         Right eye: No discharge. Left eye: No discharge. Neck:      Musculoskeletal: Normal range of motion. Cardiovascular:      Rate and Rhythm: Normal rate and regular rhythm. Heart sounds: Normal heart sounds. Pulmonary:      Effort: Pulmonary effort is normal. No respiratory distress. Breath sounds: Normal breath sounds. Abdominal:      General: Bowel sounds are normal.      Palpations: Abdomen is soft. Tenderness: There is no abdominal tenderness. Musculoskeletal: Normal range of motion. Right lower leg: No edema. Left lower leg: No edema. Skin:     General: Skin is warm and dry. Coloration: Skin is not pale. Neurological:      General: No focal deficit present. Mental Status: She is alert and oriented to person, place, and time. Psychiatric:         Behavior: Behavior normal. Behavior is cooperative.          DIAGNOSTIC RESULTS   LABS:    Labs Reviewed   CBC WITH AUTO DIFFERENTIAL - Abnormal; Notable for the following components:       Result Value    WBC 12.9 (*)     Platelets 200 (*)     Neutrophils Absolute 11.1 (*)     All other components within normal limits    Narrative:     Performed at:  [facility]  [place]   Phone [phone]   COMPREHENSIVE METABOLIC PANEL W/ REFLEX TO MG FOR LOW K - Abnormal; Notable for the following components:    Sodium 135 (*)     Chloride 98 (*)     Glucose 138 (*)     BUN 27 (*)     GFR Non- 47 (*)     GFR  56 (*)     All other components within normal limits    Narrative:     Performed at:  [facility]  [employer]. [place]   Phone [phone]   URINE RT REFLEX TO CULTURE - Abnormal; Notable for the following components:    Bilirubin Urine MODERATE (*)     Ketones, Urine 40 (*)     All other components within normal limits    Narrative:     Performed at:  [facility] [address], [place]   Phone [phone]   LIPASE    Narrative:     Performed at:  [facility] [address], [place]   Phone [phone]   TROPONIN    Narrative:     Performed at:  [facility] [address]   Phone [phone]    Narrative:     Performed at:  [facility] [address]   Phone [phone]       All other labs were within normal range or not returned as of this dictation. EKG: All EKG's are interpreted by the Emergency Department Physician in the absence of a cardiologist.  Please see their note for interpretation of EKG.       RADIOLOGY:   Non-plain film images such as CT, Ultrasound and MRI are read by the radiologist. Plain radiographic images are visualized and preliminarily interpreted by the ED Provider with the below findings:        Interpretation per the Radiologist below, if available at the time of this note:    CT Head WO Contrast   Final Result   No acute intracranial abnormality. Mild chronic small ischemic disease      Left middle cranial fossa mass most likely a meningioma. This does exert   mild mass effect on the adjacent structures likely chronic. Please correlate   with exam findings  and history      Critical results were called by Dr. Mitra Hansen to [id number] [facility] on   8/2/2020 at 18:04. XR CHEST PORTABLE   Final Result   1. Senescent pulmonary changes without acute pulmonary disease evident. 2. Calcific atherosclerosis aorta. 3. Cardiomegaly. No results found. PROCEDURES   Unless otherwise noted below, none     Procedures    CRITICAL CARE TIME   N/A    CONSULTS:  IP CONSULT TO NEUROLOGY  IP CONSULT TO HOSPITALIST      EMERGENCY DEPARTMENT COURSE and DIFFERENTIAL DIAGNOSIS/MDM:   Vitals:    Vitals:    08/02/20 1615 08/02/20 1800   BP: 121/71 (!) 125/55   Pulse: 67 74   Resp: 16    Temp: 97.8 °F (36.6 °C)    SpO2: 96% 98%   Weight: 87 lb (39.5 kg)    Height: 5' (1.524 m)        Patient was given the following medications:  Medications   0.9 % sodium chloride bolus (0 mLs Intravenous Stopped 8/2/20 2021)   0.9 % sodium chloride bolus (1,000 mLs Intravenous New Bag 8/2/20 2026)           Pertinent Labs & Imaging studies reviewed. (See chart for details)   -  Patient seen and evaluated in the emergency department. -  Triage and nursing notes reviewed and incorporated. -  Old chart records reviewed and incorporated. -  Patient case discussed with attending physician,  Dr. Claribel Brady. They saw and examined patient. -  Differential diagnosis includes:  Dehydration, UTI, CVA, ICH, SAH, MI, pneumonia, sepsis, pancreatitis, esophagitis, gastritis, vs COVID 19  -  Work-up included:  See above CXR, CT head without, CBC, CMP, lipase, troponin, BNP, UA, EKG  -  ED treatment included:  NS  - Consults: neurology, Mitzi Hector.  hospitalist Dr. Karime Beauchamp  -  Results discussed with patient. Labs show  CBC with WBC 12.9, platelets 740, absolute neutrophil 1.1. CMP with chloride 98, glucose 138, BUN 27. Lipase 17. Troponin negative. . CT head without shows no acute intracranial abnormalities. Mild chronic small ischemic disease. There is a left anterior temporal lobe mass which is likely extra-axial in location vision 4.4 x 3.9 x 4.3 cm there appears to be a dural thickening identified along the anterior aspect middle cranial fossa along the sphenoid wing. There is adjacent edema identified within the compressed left upper lobe anteriorly likely related to mass-effect. There is a slight 12 mm midline shift with partial effacement of the left lateral ventricle anteriorly. This is due to the associated mass-effect. There is evidence of left uncal herniation noted with partial effacement suprasellar cistern also likely a chronic finding. CXR shows senescent pulmonary changes without acute pulmonary disease evident. Calcific atherosclerosis aorta. Cardiomegaly. The patient is agreeable with plan of care and disposition. Dr. Karime Beauchamp is wanted to speak with the patient and daughter at bedside. Daughter is now requesting patient to be discharged home and feels like she is safe at home alone and able to take care of herself. -  Disposition:   admission    Phone call with Dr. Aida Raymond at [facility] who said pt is able to stay at [facility] for further work-up. FINAL IMPRESSION      1. General weakness    2. Dehydration    3.  Meningioma ([address]          DISPOSITION/PLAN   DISPOSITION    Admission  Pt now to be d/c'd home         (Please note that portions of this note were completed with a voice recognition program.  Efforts were made to edit the dictations but occasionally words are mis-transcribed.)    ASHLY Moreau CNP (electronically signed)            ASHLY Moreau CNP  08/03/20 0115

## 2020-08-02 NOTE — PLAN OF CARE
Called regarding patient    Being admitted for poor PO intake, failure to thrive    CT head with left frontal large meningioma    Recommend    Medicine Admit  Can get MRI when able  Medicine can consult Neurosurgery in house as an outpatient depending on how the patient is doing    No acute neurosurgery intervention at this time

## 2020-08-03 NOTE — CONSULTS
Hospital Medicine Consult history & Physical      PCP: Annette Leal DO    Date of Admission: 8/2/2020    Date of Service: Pt seen/examined on 8/2/2020 at [facility].    Chief Complaint: Daughter's concern      History Of Present Illness:      80 y.o. female with medical history including coronary disease, heart failure, hyperlipidemia, hypertension who lives independently at home with strong support of children is brought in by daughter over concerns that she has not been eating or drinking adequately and is perhaps somewhat confused. Evaluation in the emergency room remarkable for slightly elevated BUN at 27, mild leukocytosis at 12.9 without bandemia, mild thrombocytopenia, ketones in urine. CT head without contrast obtained  Demonstrating findings of left middle cranial fossa mass felt to be most likely meningioma. Mild mass-effect noted on adjacent structures. Otherwise only notable for mild chronic ischemic changes without acute findings. Chest x-ray noting cardiomegaly without evidence of acute process. past Medical History:          Diagnosis Date    Anxiety     Aortic stenosis, severe     CAD (coronary artery disease)     CHF (congestive heart failure) (HCC)     Hyperlipidemia     Hypertension        Past Surgical History:          Procedure Laterality Date    AORTIC VALVE SURGERY  01/2018    TAVR,  juan josé clayton vesdaiana    BACK SURGERY      CHOLECYSTECTOMY      CORONARY ANGIOPLASTY WITH STENT PLACEMENT  12/2017    Two Stents    FIXATION KYPHOPLASTY Bilateral 02/04/2020    T11 and T12 - Dr. Valenzuela Hector         Medications Prior to Admission:      Prior to Admission medications    Medication Sig Start Date End Date Taking?  Authorizing Provider   aspirin 81 MG chewable tablet CHEW AND SWALLOW 1 TABLET BY MOUTH DAILY 4/20/20  Yes Qi Pena MD   metoprolol succinate (TOPROL XL) 25 MG extended release tablet TAKE 1 TABLET BY MOUTH DAILY 3/23/20  Yes disease evident. 2. Calcific atherosclerosis aorta. 3. Cardiomegaly. ASSESSMENT:  Family concerned  Occasional poor p.o. with fluids and food to some extent at home. No concerns about patient's ability to ambulate. She does use a walker at home. Very strong family support. No evidence of electrolyte abnormalities or acute kidney injury. Urine specific gravity 1.017 although moderate ketones noted in urinalysis. Patient received 2 L IV normal saline here. After discussions with patient's daughter at bedside and patient herself they will elect to return home after completion of IV fluids. Discussed with emergency department provider who is in agreement. Left middle cranial fossa mass   Incidental finding felt most likely to be meningioma. Discussed results with family. Emergency department discussed with neurosurgery/Mitzi who did not believe it was acute issue. No concerns for side effects from family. They did believe that even if it did occur that they would likely not elect for intervention. PLAN:  Discharge home with daughter  Encouraged at least 1.5 to 2 L fluid consumption a day as minimum with regular food intake. Family can discuss CT findings with primary care for further work-up if desired. Lonny Chance MD    Thank you Althia Rubinstein, DO for the opportunity to be involved in this patient's care. If you have any questions or concerns please feel free to contact me at [phone].

## 2020-08-16 PROBLEM — G93.89 BRAIN MASS: Status: ACTIVE | Noted: 2020-01-01

## 2020-08-16 NOTE — ED NOTES
Bed: 09  Expected date:   Expected time:   Means of arrival:   Comments:  cy Warner RN  08/16/20 3242

## 2020-08-16 NOTE — ED NOTES
Report given to floor. Transportation ordered for PT. Family updated on visitation.      Fidel Montes De Oca RN  08/16/20 4981

## 2020-08-16 NOTE — ED NOTES
NS bolus infusing as ordered. Family given update and offered a drink. Family updated on visitation restrictions on 5th floor. PT and family denies any needs at this time. Lights dimmed in room.   Side rails remain up X2, family has call sridhar Alva RN  08/16/20 7515

## 2020-08-16 NOTE — ED PROVIDER NOTES
[id number] [facility] Emergency Department      Pt Name: Shay Antoine  MRN: [id number]  [place] 6/26/1930  Date of evaluation: 8/16/2020  Provider: Terry Hoffman MD  CHIEF COMPLAINT  Chief Complaint   Patient presents with    Altered Mental Status     Pt brought in by [facility] from home. Squad reports patient was found by daughter, daughter reports patient to be more tired than usual. Per squad daughter reports patient to have 'not been drinking a whole lot of fluids. \" Patient current A/O and able to answer questions during triage     HPI  Shay Antoine is a 80 y.o. female who presents because of weakness. She reports feeling \"tired\". Her daughter reports that she was \"unresponsive\" at home. She went to check on her today and found her laying on the couch with her feet up. She would not wake up. She had evaluation for weakness at the beginning of this month. She was found to have dehydration. She was also found to have a mass in her brain. Neurosurgery was consulted. Ultimately, she felt better and went home after hydration. She was brought to the hospital again today by ambulance. She says she lives alone. She appears very weak and is slow to answer questions. She denies any pain. She denies having a fall. She denies dysuria or frequency. She has not had vomiting or diarrhea. Liquid intake may have been decreased from normal recently. Daughter says she likely has not had anything to eat all day based on the appearance of her residence when she went to check on her. REVIEW OF SYSTEMS:  No fever, no focal weakness, no chest pain, no headache, no sob, no abdominal pain, no focal n/t/w Pertinent positives and negatives as per the HPI. All other review of systems reviewed and negative. Nursing notes reviewed.     PAST MEDICAL HISTORY  Past Medical History:   Diagnosis Date    Anxiety     Aortic stenosis, severe     CAD (coronary artery disease)     CHF [facility]congestive heart failure) ([facility] [address]     Hyperlipidemia     Hypertension      SURGICAL HISTORY  Past Surgical History:   Procedure Laterality Date    AORTIC VALVE SURGERY  01/2018    TAVR,  drs forrest vester    BACK SURGERY      CHOLECYSTECTOMY      CORONARY ANGIOPLASTY WITH STENT PLACEMENT  12/2017    Two Stents    FIXATION KYPHOPLASTY Bilateral 02/04/2020    T11 and T12 - Dr. Kiresten Hernandez:  No current facility-administered medications on file prior to encounter. Current Outpatient Medications on File Prior to Encounter   Medication Sig Dispense Refill    aspirin 81 MG chewable tablet CHEW AND SWALLOW 1 TABLET BY MOUTH DAILY 30 tablet 11    metoprolol succinate (TOPROL XL) 25 MG extended release tablet TAKE 1 TABLET BY MOUTH DAILY 90 tablet 1    amLODIPine (NORVASC) 5 MG tablet TAKE 1 TABLET BY MOUTH EVERY DAY 90 tablet 1    vitamin D (ERGOCALCIFEROL) 1.25 MG (04811 UT) CAPS capsule Take 1 capsule by mouth once a week 12 capsule 1    isosorbide mononitrate (IMDUR) 30 MG extended release tablet TAKE 1 TABLET BY MOUTH DAILY 90 tablet 3    acetaminophen (TYLENOL) 500 MG tablet Take 2 tablets by mouth every 8 hours for 7 days 120 tablet 3    furosemide (LASIX) 20 MG tablet Take 1 tablet by mouth daily as needed (for weight gain more than 2lbs in 2 days) 90 tablet 0    potassium chloride (KLOR-CON M) 20 MEQ extended release tablet Take 1 tablet by mouth daily as needed (take 1 tablet when she takes lasix) 90 tablet 3     ALLERGIES  Patient has no known allergies.   FAMILY HISTORY  Family History   Problem Relation Age of Onset    Other Mother     Cancer Sister      SOCIAL HISTORY:  Social History     Tobacco Use    Smoking status: Never Smoker    Smokeless tobacco: Never Used   Substance Use Topics    Alcohol use: No    Drug use: No     IMMUNIZATIONS:  Noncontributory    PHYSICAL EXAM  VITAL SIGNS:  Blood pressure (!) 152/67, pulse 67, temperature 98.7 °F (37.1 °C), temperature source Oral, Kandace Ward   Phone [phone]   ETHANOL    Narrative:     Performed at:  [facility] [address],  [place], [place]   Phone [phone]   URINALYSIS     EKG:  Read by me in the absence of a cardiologist shows: Sinus rhythm, rate 60, intervals normal, axis 49 degrees, delayed R wave progression, no ST elevation, minimal change when compared to prior study    RADIOLOGY:    Plain x-rays were viewed by me:   XR CHEST PORTABLE   Final Result   No acute process. CT HEAD WO CONTRAST   Final Result   1. Left middle cranial fossa mass again noted. Given its size increased   density, again this most likely represents a meningioma. There is midline   shift to the right measuring 4 mm. 2. No acute hemorrhage   3.  No significant change identified           ED COURSE:    Medications administered:  Medications   furosemide (LASIX) tablet 20 mg (has no administration in time range)   isosorbide mononitrate (IMDUR) extended release tablet 30 mg (has no administration in time range)   metoprolol succinate (TOPROL XL) extended release tablet 25 mg (has no administration in time range)   vitamin D (ERGOCALCIFEROL) capsule 50,000 Units (has no administration in time range)   sodium chloride flush 0.9 % injection 10 mL (has no administration in time range)   sodium chloride flush 0.9 % injection 10 mL (has no administration in time range)   acetaminophen (TYLENOL) tablet 650 mg (has no administration in time range)     Or   acetaminophen (TYLENOL) suppository 650 mg (has no administration in time range)   polyethylene glycol (GLYCOLAX) packet 17 g (has no administration in time range)   promethazine (PHENERGAN) tablet 12.5 mg (has no administration in time range)     Or   ondansetron (ZOFRAN) injection 4 mg (has no administration in time range)   enoxaparin (LOVENOX) injection 30 mg (has no administration in time range)   0.9 % sodium chloride bolus (0 mLs Intravenous Stopped 8/16/20 1518)   0.9 % sodium chloride bolus (0 mLs Intravenous Stopped 8/16/20 1748)     PROCEDURES:  None    CRITICAL CARE:  None    CONSULTATIONS:  Neurosurgery benito, Hospitalist     MEDICAL DECISION MAKING: Siri Millan is a 80 y.o. female who presented because of weakness, decreased responsiveness. She does have a brain mass. It does not appear to be significantly changed from when it was imaged on August 2. At that time, Dr. Tatiana Eisenberg, neurosurgery was consulted. He recommended medical admit, MRI when able, neurosurgical intervention not planned at that time. After discussion with patient and family, the ultimate decision was that she was discharged. With her recurrent presentation with similar but worsening symptoms, will admit. I have notified the hospitalist.  I spoke with Dr Jr Dean who agrees admission at [facility] for further work up will be fine as long as family is agreeable this time. I spoke with her daughter who is in agreement. Urine was pending at the time of admission as the nurse needed to obtain it via straight cath. Pt remained sleepy but arouseable. FINAL IMPRESSION:    1. Brain mass    2. Altered mental status, unspecified altered mental status type        (Please note that I used voice recognition software to generate this note.   Occasionally words are mistranscribed despite my efforts to edit errors.)        Lyubov Eid MD  08/16/20 9626

## 2020-08-16 NOTE — PROGRESS NOTES
Pt admitted to room 5576, VSS exec to slightly elevated BP, pt is afebrile, denies having pain, resting comfortably in bed. Pt alert to self, knows she's in a hospital, just not which one. Pt is able to follow commands, is calm and cooperative. Pt passed swallow screen with 3 ox. Water. POC discussed to pt's dtr Juan R De La Paz who's at the bedside, questions/concerns addressed at this time. Fall px in place, bed alarm engaged, call light within reach, will continue to monitor.

## 2020-08-17 NOTE — PROGRESS NOTES
Speech Language/Pathology   SPEECH LANGUAGE AND CLINICAL BEDSIDE SWALLOWING EVALUATION   Speech Therapy Department     Patient Name:  Jhony Russo  :  1930  Pain level:denies  Medical Diagnosis:  Brain mass [G93.89]  Brain mass [G93.89]    HPI:  Jhony Russo is a 80 y.o. female with recent diagnosis of meningioma, HTN, CAD, is brought in by daughter d/t altered mental status . The pt lives alone. The daughter checked up on her found her laying on the sofa not responding when called. CT head showed left fossa brain mass with midline shift. The mass has reportedly enlarged since 2 weeks ago. The pt was seen at [facility] ER on 20 for poor oral intake and dehydration. CT head showed  brain mass concerning for meningioma. Neurosurgery at the time recommended  outpt follow up . Pt was hydrated and dc home   The pt appears very weak and frail. She answers some questions is oriented to person only denies any pain\"  CXR revealed:  Impression    No acute process.           SLP eval and treat orders received. Per chart, pt NPO pending evaluation. 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 Recommendations were Regular texture diet with thin liquids. Pt was a poor historian however, denied any difficulty with swallowing prior to this admission. Pt passed 3 oz swallow screen with RN. CLINICAL SWALLOW EVALUATION:  Dysphagia Treatment Diagnosis: Oropharyngeal Dysphagia     Impressions: Pt was positioned upright in bed. Pt was sleepy, requiring cueing to maintain alertness. Oral mechanism examination appeared to be grossly [place]. Pt missing some dentition. Volitional swallow revealed decreased laryngeal elevation. Pt reported poor appetite and was resistant to PO trials, she accepted minimal PO trials.  PT demonstrated suspected reduced bolus control with premature bolus loss,

## 2020-08-17 NOTE — H&P
HOSPITALISTS HISTORY AND PHYSICAL    8/16/2020 8:50 PM    Patient Information:  Edwin Jung is a 80 y.o. female [id number]  PCP:  Erin Barajas DO (Tel: [phone] )    Chief complaint:    Chief Complaint   Patient presents with    Altered Mental Status     Pt brought in by [facility] from home. Squad reports patient was found by daughter, daughter reports patient to be more tired than usual. Per squad daughter reports patient to have 'not been drinking a whole lot of fluids. \" Patient current A/O and able to answer questions during triage        History of Present Illness:  Rosalva Constantino is a 80 y.o. female with recent diagnosis of meningioma, HTN, CAD, is brought in by daughter d/t altered mental status . The pt lives alone. The daughter checked up on her found her laying on the sofa not responding when called. CT head showed left fossa brain mass with midline shift. The mass has reportedly enlarged since 2 weeks ago. The pt was seen at [facility] ER on 08/02/20 for poor oral intake and dehydration. CT head showed  brain mass concerning for meningioma. Neurosurgery at the time recommended  outpt follow up . Pt was hydrated and dc home   The pt appears very weak and frail. She answers some questions is oriented to person only denies any pain    REVIEW OF SYSTEMS:   Constitutional: Negative for fever,chills or night sweats  ENT: Negative for rhinorrhea, epistaxis, hoarseness, sore throat. Respiratory: Negative for shortness of breath,wheezing  Cardiovascular: Negative for chest pain, palpitations   Gastrointestinal: Negative for nausea, vomiting, diarrhea  Genitourinary: Negative for polyuria, dysuria   Hematologic/Lymphatic: Negative for bleeding tendency, easy bruising  Musculoskeletal: Negative for myalgias and arthralgias  Neurologic: Negative for confusion,dysarthria.   Skin: Negative for itching,rash  Psychiatric: Negative for depression,anxiety, agitation. Endocrine: Negative for polydipsia,polyuria,heat /cold intolerance. Past Medical History:   has a past medical history of Anxiety, Aortic stenosis, severe, CAD (coronary artery disease), CHF (congestive heart failure) ([facility] [address] Hyperlipidemia, and Hypertension. Past Surgical History:   has a past surgical history that includes Cholecystectomy; back surgery; Tonsillectomy; Coronary angioplasty with stent (12/2017); Aortic valve surgery (01/2018); and Fixation Kyphoplasty (Bilateral, 02/04/2020). Medications:  No current facility-administered medications on file prior to encounter.       Current Outpatient Medications on File Prior to Encounter   Medication Sig Dispense Refill    aspirin 81 MG chewable tablet CHEW AND SWALLOW 1 TABLET BY MOUTH DAILY 30 tablet 11    metoprolol succinate (TOPROL XL) 25 MG extended release tablet TAKE 1 TABLET BY MOUTH DAILY 90 tablet 1    amLODIPine (NORVASC) 5 MG tablet TAKE 1 TABLET BY MOUTH EVERY DAY 90 tablet 1    vitamin D (ERGOCALCIFEROL) 1.25 MG (71612 UT) CAPS capsule Take 1 capsule by mouth once a week 12 capsule 1    isosorbide mononitrate (IMDUR) 30 MG extended release tablet TAKE 1 TABLET BY MOUTH DAILY 90 tablet 3    acetaminophen (TYLENOL) 500 MG tablet Take 2 tablets by mouth every 8 hours for 7 days 120 tablet 3    furosemide (LASIX) 20 MG tablet Take 1 tablet by mouth daily as needed (for weight gain more than 2lbs in 2 days) 90 tablet 0    potassium chloride (KLOR-CON M) 20 MEQ extended release tablet Take 1 tablet by mouth daily as needed (take 1 tablet when she takes lasix) 90 tablet 3     Current Facility-Administered Medications   Medication Dose Route Frequency Provider Last Rate Last Dose    furosemide (LASIX) tablet 20 mg  20 mg Oral Daily PRN Annmarie Jimenez MD        isosorbide mononitrate (IMDUR) extended release tablet 30 mg  30 mg Oral Daily Annmarie Jimenez MD        metoprolol succinate (TOPROL XL) extended release tablet 25 mg  25 mg Oral Daily Ele Shelton MD        vitamin D (ERGOCALCIFEROL) capsule 50,000 Units  50,000 Units Oral Weekly Ele Shelton MD        sodium chloride flush 0.9 % injection 10 mL  10 mL Intravenous 2 times per day Ele Shelton MD   10 mL at 08/17/20 0000    sodium chloride flush 0.9 % injection 10 mL  10 mL Intravenous PRN Ele Shelton MD        acetaminophen (TYLENOL) tablet 650 mg  650 mg Oral Q6H PRN Ele Shelton MD        Or   Nathaniel Britt acetaminophen (TYLENOL) suppository 650 mg  650 mg Rectal Q6H PRN Ele Shelton MD        polyethylene glycol (GLYCOLAX) packet 17 g  17 g Oral Daily PRN Ele Shelton MD        promethazine (PHENERGAN) tablet 12.5 mg  12.5 mg Oral Q6H PRN Ele Shelton MD        Or    ondansetron (ZOFRAN) injection 4 mg  4 mg Intravenous Q6H PRN Ele Shelton MD        enoxaparin (LOVENOX) injection 30 mg  30 mg Subcutaneous Daily Ele Shelton MD        cefTRIAXone (ROCEPHIN) 1 g in sterile water 10 mL IV syringe  1 g Intravenous Q24H Ele Shelton MD   1 g at 08/16/20 2220       Allergies:  No Known Allergies     Social History:   reports that she has never smoked. She has never used smokeless tobacco. She reports that she does not drink alcohol or use drugs. Family History:  family history includes Cancer in her sister; Other in her mother. ,     Physical Exam:  BP (!) 163/66   Pulse 59   Temp 97.6 °F (36.4 °C) (Oral)   Resp 14   Ht 5' (1.524 m)   Wt 87 lb (39.5 kg)   SpO2 97%   BMI 16.99 kg/m²     General appearance: cachetic   Eyes: Sclera clear, pupils equal  ENT: Moist mucus membranes, no thrush. Trachea midline. Cardiovascular: Regular rhythm, normal S1, S2. No murmur, gallop, rub.  No edema in lower extremities  Respiratory: Clear to auscultation bilaterally, no wheeze, good inspiratory effort  Gastrointestinal: Abdomen soft, non-tender, not distended, normal bowel sounds  Musculoskeletal: No cyanosis in digits, neck supple  Neurology: does not open eyes answers some questions. Oriented to person only  Psychiatry: Appropriate affect. Not agitated  Skin: Warm, dry, normal turgor, no rash    Labs:  CBC:   Lab Results   Component Value Date    WBC 8.7 08/16/2020    RBC 4.45 08/16/2020    HGB 14.1 08/16/2020    HCT 42.5 08/16/2020    MCV 95.5 08/16/2020    MCH 31.6 08/16/2020    MCHC 33.1 08/16/2020    RDW 14.9 08/16/2020     08/16/2020    MPV 9.4 08/16/2020     BMP:    Lab Results   Component Value Date     08/16/2020    K 3.7 08/16/2020    CL 98 08/16/2020    CO2 23 08/16/2020    BUN 22 08/16/2020    CREATININE 0.8 08/16/2020    CALCIUM 9.6 08/16/2020    GFRAA >60 08/16/2020    LABGLOM >60 08/16/2020    GLUCOSE 122 08/16/2020       Chest Xray:   EKG:        Problem List  Active Problems:    Brain mass  Resolved Problems:    * No resolved hospital problems. *        Assessment/Plan:         1. Altered mental status multifactorial  Brain mass and UTI  CT head meningioma with midline shift  SLP eval  Neurosurgery was consulted by ER  Most likely not a candidate for invasive procedures  Palliative care consulted to discuss Code status and Goals of care with family    2. UTI  Cultures pending  Started on ceftriaxone    HTN cont home meds    Underweight BMI 16 dietary consult    Admit as inpatient. I anticipate hospitalization spanning more than two midnights for investigation and treatment of the above medically necessary diagnoses.       Miri Martinez MD    8/16/2020 8:50 PM

## 2020-08-17 NOTE — PROGRESS NOTES
Speech Language Pathology  Dysphagia Treatment Note    Name:  Madelin Marino  :   1930  Medical Diagnosis:  Brain mass [G93.89]  Brain mass [G93.89]  Treatment Diagnosis: Oropharyngeal Dysphagia  Pain level: denies     New SLP dysphagia orders received. Re-evaluation was completed at this time as pt was agreeable. Current Diet Level: NPO   Tolerance of Current Diet Level:  N/A     Assessment of Texture Tolerance:  -Impressions: Pt was positioned upright in bed. She was agreeable to limited PO trials. Upon further chart review it appears pt with hx of zenker's diverticulum with procedure to repair zenker's and dilate esophagus in 2019. Pt a poor historian and unable to provide any supplemental hx. Pt agreed to limited trials. She demonstrated minimal mastication, suspected premature bolus loss, suspected pharyngeal pooling, delayed swallow initiation, decreased laryngeal elevation and suspected impaired pharyngeal clearing. Pt required multiple swallows to clear. Intermittent questionable signs of esophogeal backflow were noted characterized by belching and delayed change in vocal quality with delayed coughing. Cough appeared effective in clearing material. Pt appeared to demonstrate gross tolerance of puree and thin liquids (small sips). Diet and Treatment Recommendations:  Initiate Dysphagia I Pureed with thin liquids, meds with puree   If respiratory status declines recommend downgrade to NPO     (Dysphagia Goals addressed, if appropriate)  1.) Pt will tolerate ongoing assessment of swallow function with diet to be determined as indicated. (ongoing 2020)     Plan:  Continued daily Dysphagia treatment with goals per plan of care. Patient/Family Education:Education given to the Pt and nurse, who verbalized understanding    Discharge Recommendations:  Pt will benefit from continued skilled Speech Therapy for Dysphagia services, prior to returning home.     Timed Code Treatment: 0 minutes Total Treatment Time:  20 minutes     If patient discharges prior to next session this note will serve as a discharge summary.      Zully Gonzales, [address] [facility]  Speech Language Pathologist

## 2020-08-17 NOTE — PROGRESS NOTES
Occupational Therapy   Occupational Therapy Initial Assessment  Date: 2020   Patient Name: Beto Beltran  MRN: [id number]     : 1930    Date of Service: 2020    Discharge Recommendations: Beto Beltran scored a 15/24 on the AM-PAC ADL Inpatient form. Current research shows that an AM-PAC score of 17 or less is typically not associated with a discharge to the patient's home setting. Based on the patient's AM-PAC score and their current ADL deficits, it is recommended that the patient have 3-5 sessions per week of Occupational Therapy at d/c to increase the patient's independence. Please see assessment section for further patient specific details. If patient discharges prior to next session this note will serve as a discharge summary. Please see below for the latest assessment towards goals. OT Equipment Recommendations  Other: continue to assess pending progress    Assessment   Performance deficits / Impairments: Decreased functional mobility ; Decreased endurance;Decreased ADL status; Decreased cognition  Assessment: Pt presents with the above deficit impacting her occupational performance.  Pt is not at baseline level and would benefit from continued OT services in order to maximize independence and safety  Treatment Diagnosis: multiple performance deficits associated with w/ AMS and UTI  Prognosis: Good  Decision Making: Medium Complexity  History: (I) with ADLs and mobility; assist with IADLs  OT Education: OT Role;Plan of Care;ADL Adaptive Strategies;Transfer Training  Patient Education: d/c planning- pt verbalized understanding of all education however will require continued reinforcement  Barriers to Learning: cognition  REQUIRES OT FOLLOW UP: Yes  Activity Tolerance  Activity Tolerance: Patient Tolerated treatment well;Treatment limited secondary to medical complications (free text)  Activity Tolerance: Orthostatic BP- supine 149/53, /67, post stance 90/42 (pt unable to remain standing d/t dizziness and fatigue)  Safety Devices  Safety Devices in place: Yes  Type of devices: All fall risk precautions in place;Nurse notified; Bed alarm in place;Call light within reach; Left in bed           Patient Diagnosis(es): The primary encounter diagnosis was Brain mass. A diagnosis of Altered mental status, unspecified altered mental status type was also pertinent to this visit. has a past medical history of Anxiety, Aortic stenosis, severe, CAD (coronary artery disease), CHF (congestive heart failure) ([facility] [address] Hyperlipidemia, and Hypertension. has a past surgical history that includes Cholecystectomy; back surgery; Tonsillectomy; Coronary angioplasty with stent (12/2017); Aortic valve surgery (01/2018); and Fixation Kyphoplasty (Bilateral, 02/04/2020). Treatment Diagnosis: multiple performance deficits associated with w/ AMS and UTI      Restrictions  Restrictions/Precautions  Restrictions/Precautions: Fall Risk(high fall risk)  Required Braces or Orthoses?: No  Position Activity Restriction  Other position/activity restrictions: Benito Toledo is a 80 y.o. female with recent diagnosis of meningioma, HTN, CAD, is brought in by daughter d/t altered mental status . The pt lives alone. The daughter checked up on her found her laying on the sofa not responding when called. CT head showed left fossa brain mass with midline shift. The mass has reportedly enlarged since 2 weeks ago. The pt was seen at [facility] ER on 08/02/20 for poor oral intake and dehydration. CT head showed  brain mass concerning for meningioma. Neurosurgery at the time recommended  outpt follow up .  Pt was hydrated and dc home    Subjective   General  Chart Reviewed: Yes  Patient assessed for rehabilitation services?: Yes  Family / Caregiver Present: No  Diagnosis: AMS: UTI  Subjective  Subjective: pt supine in bed at entry, agreeable to eval. pt confused at times/decreased memory- able to be redirected  Patient brief over hips)  Toileting: Maximum assistance;Setup; Increased time to complete(assist required to initiate breif change d/t saturated brief- assist for posterior atilio hygiene and doffing soiled brief, assist to don new brief)  Additional Comments: EOB UB/LB dressing and brief change- stance to to RW. increased time to complete and cues to initiate and sequence tasks  Tone RUE  RUE Tone: Normotonic  Tone LUE  LUE Tone: Normotonic  Coordination  Movements Are Fluid And Coordinated: No  Coordination and Movement description: Decreased speed;Fine motor impairments     Bed mobility  Rolling to Left: Minimal assistance  Supine to Sit: Minimal assistance  Sit to Supine: Minimal assistance  Scooting: Minimal assistance  Comment: HOB elevated, use of bed rail; assist to transition LEs during sit>supine, pt pulled on therapist hand for supine>sit  Transfers  Sit to stand: Contact guard assistance  Stand to sit: Contact guard assistance  Transfer Comments: to/from EOB x1     Cognition  Overall Cognitive Status: Exceptions  Arousal/Alertness: Appropriate responses to stimuli  Following Commands:  Follows one step commands with increased time  Attention Span: Attends with cues to redirect  Memory: Decreased recall of recent events;Decreased short term memory  Safety Judgement: Decreased awareness of need for assistance;Decreased awareness of need for safety  Problem Solving: Assistance required to identify errors made;Assistance required to generate solutions  Insights: Decreased awareness of deficits  Initiation: Requires cues for some  Sequencing: Requires cues for some        Sensation  Overall Sensation Status: WFL(pt denied N/T)        LUE AROM (degrees)  LUE AROM : WFL  RUE AROM (degrees)  RUE AROM : WFL                      Plan   Plan  Times per week: 3-5x  Times per day: Daily  Current Treatment Recommendations: Strengthening, Patient/Caregiver Education & Training, Functional Mobility Training, Endurance Training,

## 2020-08-17 NOTE — CONSULTS
Comprehensive Nutrition Assessment    Type and Reason for Visit:  Consult(supplement recommendations, underweight)    Nutrition Recommendations/Plan:   1. Recommend SLP evaluation prior to diet advancement  2. RD will add oral nutrition supplement TID as diet advances  3. Monitor for adequate PO intake as diet advances. Nutrition Assessment:  Pt is nutritionally compromised upon admission as evidenced by report of decreased appetite and PO intake over the past several weeks prior to admission. Note pt recently found to have brain mass concerning for meningioma. Per weight hx in EMR, pt with 14 lb (14%) weight loss since January 2020; unsure if this has been gradual weight loss or if it has occurred more recently. Note report of difficulty swallowing prior to admission. Pt is currently NPO. Recommend SLP evaluation prior to diet advancement. RD will order oral nutrition supplements as appropriate based on level of diet advancement per MD/SLP. Malnutrition Assessment:  Malnutrition Status:  Insufficient data      Estimated Daily Nutrient Needs:  Energy (kcal):  6923-7967; Weight Used for Energy Requirements:  Current(39 kg)     Protein (g):  47-59 grams; Weight Used for Protein Requirements:  Current(39 kg; 1.2-1.5 grams per kg)        Fluid (ml/day):  1 mL per kcal; Weight Used for Fluid Requirements:  Current      Nutrition Related Findings:  Oriented to person. No edema noted. +1.5 liters. Appears frail, weak.       Wounds:  None       Current Nutrition Therapies:    Diet NPO Effective Now    Anthropometric Measures:  · Height: 5' (152.4 cm)  · Current Body Weight: 86 lb (39 kg)   · Usual Body Weight: 100 lb (45.4 kg)     · Ideal Body Weight: 100 lbs  · BMI: 16.8  · BMI Categories: Underweight (BMI less than 18.5)       Nutrition Diagnosis:   · Inadequate oral intake related to inadequate protein-energy intake as evidenced by poor intake prior to admission, weight loss greater than or equal to 10% in 6

## 2020-08-17 NOTE — PLAN OF CARE
Problem: Skin Integrity:  Goal: Will show no infection signs and symptoms  Description: Will show no infection signs and symptoms  8/17/2020 1004 by Yoli Drake RN  Outcome: Ongoing  8/17/2020 0022 by Jani Bowden RN  Outcome: Ongoing  Goal: Absence of new skin breakdown  Description: Absence of new skin breakdown  8/17/2020 1004 by Yoli Drake RN  Outcome: Ongoing  8/17/2020 0022 by Jani Bowden RN  Outcome: Ongoing     Problem: Falls - Risk of:  Goal: Will remain free from falls  Description: Will remain free from falls  8/17/2020 1004 by Yoli Drake RN  Outcome: Ongoing  8/17/2020 0022 by Jani Bowden RN  Outcome: Ongoing  Goal: Absence of physical injury  Description: Absence of physical injury  8/17/2020 1004 by Yoli Drake RN  Outcome: Ongoing  8/17/2020 0022 by Jani Bowden RN  Outcome: Ongoing     Problem: Pain:  Goal: Pain level will decrease  Description: Pain level will decrease  8/17/2020 1004 by Yoli Drake RN  Outcome: Ongoing  8/17/2020 0022 by Jani Bowden RN  Outcome: Ongoing  Goal: Control of acute pain  Description: Control of acute pain  8/17/2020 1004 by Yoli Drake RN  Outcome: Ongoing  8/17/2020 0022 by Jani Bowden RN  Outcome: Ongoing  Goal: Control of chronic pain  Description: Control of chronic pain  8/17/2020 1004 by Yoli Drake RN  Outcome: Ongoing  8/17/2020 0022 by Jani Bowden RN  Outcome: Ongoing

## 2020-08-17 NOTE — PROGRESS NOTES
.4 Eyes Skin Assessment     The patient is being assess for  Admission    I agree that 2 RN's have performed a thorough Head to Toe Skin Assessment on the patient. ALL assessment sites listed below have been assessed. Areas assessed by both nurses:   [x]   Head, Face, and Ears   [x]   Shoulders, Back, and Chest  [x]   Arms, Elbows, and Hands   [x]   Coccyx, Sacrum, and IschIum  [x]   Legs, Feet, and Heels        Does the Patient have Skin Breakdown?   Yes LDA WOUND CARE was Initiated documentation include the Elidia-wound, Wound Assessment, Measurements, Dressing Treatment, Drainage, and Color\",         Gustavo Prevention initiated:  Yes   Wound Care Orders initiated:  NA      [facility] nurse consulted for Pressure Injury (Stage 3,4, Unstageable, DTI, NWPT, and Complex wounds), New and Established Ostomies:  NA      Nurse 1 eSignature: Electronically signed by Kimberly Barlow RN on 8/17/20 at 6:00 AM EDT    **SHARE this note so that the co-signing nurse is able to place an eSignature**    Nurse 2 eSignature: Electronically signed by Rena Israel RN on 8/17/20 at 6:02 AM EDT

## 2020-08-17 NOTE — PROGRESS NOTES
Physical Therapy    Facility/Department: [address] ONCOLOGY  Initial Assessment    NAME: Dariusz Disla  : 1930  MRN: [id number]    Date of Service: 2020    Discharge Recommendations:    Dariusz Disla scored a 1224 on the AM-PAC short mobility form. Current research shows that an AM-PAC score of 17 or less is typically not associated with a discharge to the patient's home setting. Based on the patient's AM-PAC score and their current functional mobility deficits, it is recommended that the patient have 3-5 sessions per week of Physical Therapy at d/c to increase the patient's independence. Please see assessment section for further patient specific details. If patient discharges prior to next session this note will serve as a discharge summary. Please see below for the latest assessment towards goals. PT Equipment Recommendations  Equipment Needed: No    Assessment   Body structures, Functions, Activity limitations: Decreased functional mobility ; Decreased ADL status; Decreased strength;Decreased ROM; Decreased endurance;Decreased posture;Decreased cognition;Decreased safe awareness;Decreased balance  Assessment: Pt presents with AMS secondary to a L Menigioma and UTI with associated significantly decreased activity tolerance, decreased strength, and decreased functional mobility. Pt previously lived at home and was independent with ADL's, transfers, and ambulation. Pt presents well under her baseline of functional mobility, and would benefit from continued skilled acute therapy services in order to return to her PLOF. Treatment Diagnosis: significantly decreased activity tolerance, decreased strength, and decreased functional mobility  Prognosis: Fair  Decision Making: Medium Complexity  Clinical Presentation: evolving  PT Education: Goals;PT Role;Plan of Care;Gait Training;General Safety; Functional Mobility Training;Transfer Training  Patient Education: Pt educated on d/c Limits  Hearing: Exceptions to [facility]  Hearing Exceptions: Hard of hearing/hearing concerns     Subjective  General  Chart Reviewed: Yes  Patient assessed for rehabilitation services?: Yes  Family / Caregiver Present: No  Diagnosis: AMS  Follows Commands: Within Functional Limits  General Comment  Comments: Pt supine in bed upon arrival.  Subjective  Subjective: Pt is pleasant and agreeable to therapy. Pain Screening  Patient Currently in Pain: Denies  Vital Signs  BP: (!) 149/85  Patient Currently in Pain: Denies  Oxygen Therapy  SpO2: 96 %  O2 Device: None (Room air)       Orientation  Orientation  Overall Orientation Status: Impaired  Orientation Level: Oriented to person;Disoriented to time;Disoriented to situation;Disoriented to place(first and last name only)  Social/Functional History  Social/Functional History  Lives With: Alone  Type of Home: House  Home Layout: Two level, Able to Live on Main level with bedroom/bathroom, Laundry in basement  Home Access: Stairs to enter with rails  Entrance Stairs - Number of Steps: 4  Entrance Stairs - Rails: Both(cannot recall)  Bathroom Shower/Tub: Walk-in shower  Bathroom Toilet: Standard  Bathroom Equipment: Grab bars in shower, Shower chair  Bathroom Accessibility: Accessible  Home Equipment: 4 wheeled walker, Arunnn Pore Help From: Family  ADL Assistance: Independent(\"I don't recall\")  Homemaking Assistance: Needs assistance(family assists)  Ambulation Assistance: Independent  Transfer Assistance: Independent  Active : No  Occupation: Retired  Additional Comments: Pt is a poor historian and often stated \"I don't recall\" with questions. Much of the above information was gathered from her previous chart in February, 2020.     Objective    AROM RLE (degrees)  RLE AROM: WFL  RLE General AROM: As demonstrated by functional mobility  AROM LLE (degrees)  LLE AROM : WFL  LLE General AROM: As demonstrated by functional mobility  Strength RLE  Strength RLE: WFL  Comment: As demonstrated by functional mobility  Strength LLE  Strength LLE: WFL  Comment: As demonstrated by functional mobility  Tone RLE  RLE Tone: Normotonic  Tone LLE  LLE Tone: Normotonic  Motor Control  Gross Motor?: WFL     Bed mobility  Rolling to Left: Minimal assistance  Supine to Sit: Minimal assistance  Sit to Supine: Minimal assistance  Scooting: Minimal assistance  Comment: Pt required min A for bed mobility with HOB elevated and use of side rails. Pt required assist to bring her BLE back onto the bed from sitting  Transfers  Sit to Stand: Contact guard assistance  Stand to sit: Contact guard assistance  Comment: Pt stood x1, but became dizzy and stated she needed to sit back down. Ambulation  Ambulation?: No(Not performed due to safety concerns with orthostatic hypotension)     Balance  Posture: Fair(moderate kyphosis and fwd head)  Sitting - Static: Good  Sitting - Dynamic: Fair(Required unilateral UE support when reaching outside her DWIGHT in sitting)  Standing - Static: Fair(Required CGA and BUE support to maintain static standing balance.)  Standing - Dynamic: (Unable to assess due to safety concerns)        Plan   Plan  Times per week: 3-5x  Times per day: Daily  Current Treatment Recommendations: Transfer Training, Strengthening, ROM, Balance Training, Functional Mobility Training, Stair training, Safety Education & Training, Modalities, Gait Training, Manual Therapy - Soft Tissue Mobilization, ADL/Self-care Training, Patient/Caregiver Education & Training, Endurance Training  Safety Devices  Type of devices:  All fall risk precautions in place, Bed alarm in place, Call light within reach, Gait belt, Patient at risk for falls, Left in bed, Nurse notified  Restraints  Initially in place: No      AM-PAC Score  AM-PAC Inpatient Mobility Raw Score : 12 (08/17/20 1446)  AM-PAC Inpatient T-Scale Score : 35.33 (08/17/20 1446)  Mobility Inpatient CMS 0-100% Score: 68.66 (08/17/20 1446)  Mobility Inpatient CMS G-Code Modifier : CL (08/17/20 1446)          Goals  Short term goals  Time Frame for Short term goals: by d/c  Short term goal 1: Pt will perform bed mobility with HOB elevated and use of side rails as needed with CGA. Short term goal 2: Pt will perform transfers with CGA and RW as needed. Short term goal 3: Pt will ambulate 50 ft with RW and CGA. Patient Goals   Patient goals : Unable to give       Therapy Time   Individual Concurrent Group Co-treatment   Time In 1218         Time Out 1248         Minutes 30              Timed Code Treatment Minutes:   15    Total Treatment Minutes:  [address] [facility]    PT providing direct supervision during session and assisting in making skilled judgements throughout session.   [id number] [facility] PT, DPT [id number]    [id number] [place], PT

## 2020-08-17 NOTE — PLAN OF CARE
Nutrition Problem #1: Inadequate oral intake  Intervention: Food and/or Nutrient Delivery: Continue NPO(Diet as appropriate per MD/SLP)  Nutritional Goals: Pt will tolerate diet advancement as appropriate per MD/SLP and consume at least 50% of meals and supplements

## 2020-08-17 NOTE — CONSULTS
Palliative Care:      80 y.o. female with recent diagnosis of meningioma, HTN, CAD, is brought in by daughter with  altered mental status . Patient lives alone. The daughter checked up on her found her laying on the sofa not responding when called. CT head showed left fossa brain mass with midline shift. The mass has reportedly enlarged since 2 weeks ago. The pt was seen at [facility] ER on 08/02/20 for poor oral intake and dehydration. CT head showed  brain mass concerning for meningioma. Neurosurgery at the time recommended  outpt follow up . Patient was hydrated and discharged home. Patient is weak and frail. Speech therapy is following. Speech therapy is following. Patient was placed on a dysphagia I diet. Patient has a witnessed seizure last night and was started on Keppra. Neurosurgery did see patient, no plans for surgery. CXR 8/16/20:  FINDINGS:    The lungs are without acute focal process.  There is no effusion or    pneumothorax. The cardiomediastinal silhouette is stable. The osseous    structures are stable.              Impression    No acute process. Echocardiogram 11/22/2019:   Summary   -Normal left ventricle size, wall thickness, and systolic function with an   estimated ejection fraction of 55-60%. - H/W=12.4.   -Diastolic filling parameters suggests grade I diastolic dysfunction . -Mild mitral regurgitation.   -A bioprosthetic artificial aortic valve appears well seated. -Mild aortic regurgitation is present. -Mild tricuspid regurgitation    CT Head 8/16/20:  FINDINGS:    BRAIN/VENTRICLES: The ventricles and sulci are diffusely enlarged.  Low    attenuation seen in the periventricular white matter.  Left middle cranial    fossa mass unchanged in size measuring 4.3 x 3.9 cm.  Again noted is face min    of the left lateral ventricle.  There is 4 mm of midline shift to the right.     No acute intracranial hemorrhage.         ORBITS: The visualized portion of the orbits demonstrate no acute abnormality.         SINUSES: The visualized paranasal sinuses and mastoid air cells demonstrate    no acute abnormality.         SOFT TISSUES/SKULL:  No acute abnormality of the visualized skull or soft    tissues.              Impression    1. Left middle cranial fossa mass again noted.  Given its size increased    density, again this most likely represents a meningioma.  There is midline    shift to the right measuring 4 mm. 2. No acute hemorrhage    3. No significant change identified            Past Medical History:   has a past medical history of Anxiety, Aortic stenosis, severe, CAD (coronary artery disease), CHF (congestive heart failure) ([address] Hyperlipidemia, and Hypertension. Past Surgical History:   has a past surgical history that includes Cholecystectomy; back surgery; Tonsillectomy; Coronary angioplasty with stent (2017); Aortic valve surgery (2018); and Fixation Kyphoplasty (Bilateral, 2020). Advance Directives:      Code status is full, Tammi Medley is DPOA    Problem Severity: Pain/Other Symptoms:     Patient has Tylenol available for pain. Bed Mobility/Toileting/Transfer:      Patient has been independent with ADLs. Son cuts the grass. Performance Status:      Patient scored a 12/24 on the AM-PAC short mobility form. Symptom Assessment: Appetite/Nausea/Bowels/Fatigue:      BMI is 16. Dietician is following. Patient has lost 14 pounds since January of this year. Patient reported some swallowing difficulty prior to hospital admission. Social History:   reports that she has never smoked. She has never used smokeless tobacco. She reports that she does not drink alcohol or use drugs. Family History:  family history includes Cancer in her sister; Other in her mother. Psychological/Spiritual:       Patient lives alone. She has a daughter living locally, She is [address]. Spouse is .   Patient has four children, one son and one daughter live locally. Has been a  since 5. Family Discussion:      Met with patient, she is weak and frail. Has had progressive weight loss. Call to daughter Yuli--not able to leave message. Will continue to try to reach. Spoke with daughter Trino Grant. Reviewed clinical status and options for care. She states patient has Kyphoplasty in February of this year. She states patient has poor appetite and sleeps a lot at home. Son and daughter check on her frequently. Discussed goals of care and option for hospice. Daughter states Eleuterio s I think he is DPOA. \" She was unsure if patient had a living will. Call to son Melly Proctor to discuss. Spoke with son Melly Proctor. Reviewed clinical status in detail. He states goal of care is conservative medical management, He is interested in DNR CC. States he would like to speak with [facility]. Hospice benefit and philosophy explained. Hospice [facility] consult called and faxed. Perfect serve message to MD regarding plan of care.

## 2020-08-17 NOTE — PLAN OF CARE
Neurosurgery Plan of Care    Called regarding 80year old woman who was admitted for AMS secondary to a UTI. Imaging demonstrated a large left middle fossa mass c/w a meningioma. Per nurse, patient is lethargic and family considering palliative care consultation. Given age and comorbidities, patient is not a candidate for resection of mass. No neurosurgical follow up necessary.     Michelle Kebede MD, PhD  [address], [address], [address] [phone] (c), [phone] (o)

## 2020-08-17 NOTE — PROGRESS NOTES
Last 3 Encounters:   08/17/20 86 lb 3.2 oz (39.1 kg)   08/02/20 87 lb (39.5 kg)   07/02/20 88 lb 14.4 oz (40.3 kg)       General appearance: Elderly frail  female, lying in bed not in distress   Eyes: Sclera clear. Pupils equal.  ENT: Dry oral mucosa. Trachea midline, no adenopathy. Cardiovascular: Regular rhythm, normal S1, S2. No murmur. No edema in lower extremities  Respiratory: Not using accessory muscles. Clear to auscultation bilaterally, no wheeze or crackles. GI: Abdomen soft, no tenderness, not distended, normal bowel sounds  Musculoskeletal: No cyanosis in digits, neck supple  Neurology: Able to move all 4 extremities spontaneously  Psych: Normal affect. Lethargic, oriented to person only  Skin: Warm, dry, normal turgor  Extremity exam shows brisk capillary refill. Peripheral pulses are palpable in lower extremities     Labs and Tests:  CBC:   Recent Labs     08/16/20  1416   WBC 8.7   HGB 14.1        BMP:    Recent Labs     08/16/20  1416      K 3.7   CL 98*   CO2 23   BUN 22*   CREATININE 0.8   GLUCOSE 122*     Hepatic:   Recent Labs     08/16/20  1416   AST 17   ALT 8*   BILITOT 0.4   ALKPHOS 71     XR CHEST PORTABLE   Final Result   No acute process. CT HEAD WO CONTRAST   Final Result   1. Left middle cranial fossa mass again noted. Given its size increased   density, again this most likely represents a meningioma. There is midline   shift to the right measuring 4 mm. 2. No acute hemorrhage   3. No significant change identified           Problem List  Active Problems:    Brain mass  Resolved Problems:    * No resolved hospital problems. *       Assessment & Plan:     #1. Altered mental status:  Likely combination of meningioma with midline shift, dehydration and UTI. Management as mentioned individually below. #2. Meningioma with midline shift:  Neurosurgery service consulted.   Given her age and family leaning towards palliative care, neurosurgery is holding off on surgical intervention at this time. Recommended speech and swallow evaluation before initiating patient on feeds. She is receiving IV fluids at maintenance rate. #3.  UTI:  Currently receiving IV ceftriaxone. We will follow-up urine cultures/sensitivities. #4.  Underweight:  Speech and swallow eval prior to initiating oral diet. Will initiate diet as per their recommendation. IV maintenance fluids for now. #5. History of hypertension:  BP is controlled on Norvasc, Imdur and Toprol-XL.     IV Access: Peripheral IV  Brown: No  Diet: Diet NPO Effective Now, SLP eval, iv fluids  Code:Full Code  DVT PPX none, discontinued given her risk of bleed due to age and meningioma diagnosis  Disposition pending palliative care consult and placement      Estephania Fernando MD   8/17/2020 12:43 PM

## 2020-08-18 NOTE — PROGRESS NOTES
Message sent to Dr. Libby Gomez: Potassium 3.3. Pills need to be crushed in applesauce. Can I have an order for IV potassium replacement?

## 2020-08-18 NOTE — PROGRESS NOTES
Patient assisted to the restroom and returned to bed. Became unresponsive. Rapid response called on patient at 2347 due to patient becoming unresponsive and pale. Vital signs Blood pressure- 215/118 pulse-65 oxygen 97% respirations 14. Dr. Rajan Neal entered the room at [phone]. Vitals Blood pressure 207/87 pulse 52 98% oxygen. Order for Keppra 1,000 mg one time and 500 mg daily. PRN Ativan ordered. Patient more alert and responsive. Per Dr. Liset Vail will defer need for EEG to Neuro.

## 2020-08-18 NOTE — PROGRESS NOTES
RRT called pt has known brain mass. Had witnessed seizure activity upon my arrival snoring respirations HOB elevated open mouth breathing CTA /87 HR 84, sat 98% on RA. She is a full code. Eyes are open but not following commands. 1. Start keppra 1000mg IV x1   2. keppra 500mg IV BID  3. Ativan 1mg IV Q1hr x4 then Q4hr  4. She just had CT brain yesterday so will not repeat  5. Defer to NS in am if they want an EEG  6. Add neurochecks !hr x4 then Q4hr     Postictal nurses will continue to eval for return to usual mentation level .  She started to move around more before end event    30 minutes critical care time

## 2020-08-18 NOTE — PROGRESS NOTES
Speech  Language And Dysphagia Treatment Note    Name: Rosalva Constantino  : 1930  Medical Diagnosis: Brain mass [G93.89]  Brain mass [G93.89]  Treatment Diagnosis: Oropharyngeal Dysphagia, Cognitive Linguistic Deficits   Pain: 0/10 reported by Pt    Patient's response to therapy:  Pt lethargic able to awake for brief intervals with constant cueing      Dysphagia Treatment:  Current Diet Level: Initiate Dysphagia I Pureed with thin liquids, meds with puree   If respiratory status declines recommend downgrade to NPO   Tolerance of Current Diet Level: Pt with no PO intake this date     Assessment of Texture Tolerance:  -Impressions: Pt was positioned upright in bed by SLP on RA. Pt with flat affect. She continues to report poor appetite and no interest in eating. SLP provided pt multiple PO options however she declined any solid food. Pt was accepting of limited trials of thin liquids. She appeared to benefit from use of small sips. Pt with suspected delayed swallow initiation, decreased laryngeal elevation and suspected delayed pharyngeal clearing. No overt clinical s/s of aspiration/penetration were noted with single sips of liquid. Pt was educated on importance of PO intake for nutrition/hydration and healing. Dysphagia Goals, addressed this date:  1.) Pt will tolerate ongoing assessment of swallow function with diet to be determined as indicated (ongoing 2020)     Diet and Treatment Recommendations:  Continue current diet recommendations. Speech Language Treatment:  Impressions: Pt was positioned upright in bed. She would open her eyes for brief intervals. She was oriented to person and place. She was not oriented to temporal concepts despite cueing. She was unable to recall orientation details s/p delay. Pt was unable to recall recent dx of brain tumor or reason for admission.  She demonstrates impaired short term recall and impaired insight/awareness     Speech Language STG, addressed this date: 1. Pt will participate in ongoing cognitive assessment with goals to be established as indicated (Not targeted this date)  3. Pt will improve orientation and short term recall via graded tasks to 80% (ongoing 8/18/2020)     Patient/Family Education:Education given to the Pt and nurse, who verbalized understanding    Assessment: Patient progressing toward goals    Plan: Continue as per plan of care    Discharge Recommendations: Pt will benefit from continued skilled Speech Therapy for Speech and Dysphagia services, prior to returning home. Treatment time  Timed Code Treatment Minutes: 0 minutes   Total Treatment time: 24 minutes     If patient discharges prior to next session this note will serve as a discharge summary.       Drake Hewitt, [address]  Speech Language Pathologist      -

## 2020-08-18 NOTE — PROGRESS NOTES
Rapid Response Quick Summary    Room: [facility]6119/8492-75    Assessment of concern / patient: witnessed seizure    Physician involved:  Dr. Jenny Burgos    Interventions:  Placed IV. Orders received for Keppra and prn Ativan for seizures.     Disposition:Remain on 5T

## 2020-08-18 NOTE — PROGRESS NOTES
Patient head to toe assessment completed. Vital signs WNL. Patient denies any pain. Call light within reach. Patient resting in bed.  Will continue to monitor

## 2020-08-18 NOTE — PROCEDURES
[facility]                     [facility], [address]                          ELECTROENCEPHALOGRAM REPORT    PATIENT NAME: Rubia Matson                :        1930  MED REC NO:   [id number]                          ROOM:       4357  ACCOUNT NO:   [de-identified]                           ADMIT DATE: 2020  PROVIDER:     Dee Mcdowell MD    DATE OF EE2020    SUMMARY:  The background rhythm on this recording consists of moderately  developed and well organized waveforms of 4-5 Hz frequency which are  bilaterally synchronous and symmetrical.  There was diffuse slowing  noted. There were some artifacts from the IV pump noted. No spikes  were seen. Hyperventilation was not performed. Photic stimulation did  not produce any change. A sleep recording did not produce any  additional abnormalities. IMPRESSION:  This EEG obtained during sleep is abnormal with severe  diffuse slowing consistent with a diffuse encephalopathy. Clinical  correlation is recommended.         Jimmy Bonilla MD    D: 2020 14:08:29       T: 2020 14:13:27     [email or web address]  Job#: [id number]     Doc#: [id number]    CC:

## 2020-08-18 NOTE — PROGRESS NOTES
Physical Therapy  Ericka Michelle    Attempted to see pt for PT treatment. Chart reviewed. Patient very lethargic and with pending Hospice consult. Will hold therapy at this time and will follow up with pt pending decision regarding Hospice services.  Thanks, Maria L Hernandez, DPT [id number]

## 2020-08-18 NOTE — PROGRESS NOTES
Palliative Care:     [facility] RN will be meeting with patient and her family at [phone] this pm.

## 2020-08-18 NOTE — PROGRESS NOTES
Neurocheck completed. Patient very lethargic. Pupils reactive to light and round. Patient follows commands however does not fully understand directions.

## 2020-08-18 NOTE — PROGRESS NOTES
Patient was taken to the bathroom, Return to bed. Patient was found crying then started shaking. Concerned for seizure. Rapid response called.  Will continue to monitor

## 2020-08-18 NOTE — PROGRESS NOTES
Spoke with [employer] RN from [address]. PTS will be here to  the patient at 1200. She will be transported to the [place] location. A discharge order needs to be put in and the [facility] needs to be signed in the chart per [employer], [facility] Stephen with [address]

## 2020-08-18 NOTE — CONSULTS
NEUROLOGY CONSULTATION     Patient's Name :   Alberto Taylor        YOB: 1930                    Date of Consultation : 8/18/2020 3:02 PM    Referring Physician :  Natalie Hernandez MD    Reason for Consultation :  New onset seizure    HISTORY OF PRESENT ILLNESS      Cammy Nettles is a 80 y.o. female   History was obtained from the dictations in the chart. Patient is very lethargic and not able to give any history. Patient has known meningioma  Hypertension coronary artery disease and was admitted to the hospital with altered mental status. Patient had a generalized tonic-clonic seizure. She was started on Keppra. CT head showed a left hemispheric brain mass with midline shift. Patient is very weak and frail and is not able to give any detailed history. REVIEW OF SYSTEMS     Unable to obtain due to neurological status    Past Medical History:   Diagnosis Date    Anxiety     Aortic stenosis, severe     CAD (coronary artery disease)     CHF (congestive heart failure) (HCC)     Hyperlipidemia     Hypertension      Family History   Problem Relation Age of Onset    Other Mother     Cancer Sister      Social History     Socioeconomic History    Marital status:       Spouse name: None    Number of children: None    Years of education: None    Highest education level: None   Occupational History    None   Social Needs    Financial resource strain: None    Food insecurity     Worry: None     Inability: None    Transportation needs     Medical: None     Non-medical: None   Tobacco Use    Smoking status: Never Smoker    Smokeless tobacco: Never Used   Substance and Sexual Activity    Alcohol use: No    Drug use: No    Sexual activity: Not Currently   Lifestyle    Physical activity     Days per week: None     Minutes per session: None    Stress: None   Relationships    Social connections     Talks on phone: None     Gets together: None     Attends Holiness service: None     Active member of club or organization: None     Attends meetings of clubs or organizations: None     Relationship status: None    Intimate partner violence     Fear of current or ex partner: None     Emotionally abused: None     Physically abused: None     Forced sexual activity: None   Other Topics Concern    None   Social History Narrative    None     Current Facility-Administered Medications   Medication Dose Route Frequency Provider Last Rate Last Dose    potassium chloride 10 mEq/100 mL IVPB (Peripheral Line)  10 mEq Intravenous PRN Pedro Luis Lay  mL/hr at 08/18/20 1218 10 mEq at 08/18/20 1218    amLODIPine (NORVASC) tablet 10 mg  10 mg Oral Daily Pedro Luis Lay MD   10 mg at 08/18/20 0914    isosorbide mononitrate (IMDUR) extended release tablet 60 mg  60 mg Oral Daily Pedro Luis Lay MD   60 mg at 08/18/20 0914    0.9 % sodium chloride infusion   Intravenous Continuous Pedro Luis Lay  mL/hr at 08/18/20 1218      levETIRAcetam (KEPPRA) 100 MG/ML solution 500 mg  500 mg Oral BID Giovanny Smith MD   500 mg at 08/18/20 0920    LORazepam (ATIVAN) injection 1 mg  1 mg Intravenous Q1H PRN Giovanny Smith MD        metoprolol succinate (TOPROL XL) extended release tablet 25 mg  25 mg Oral Daily Arcadio Sykes MD   25 mg at 08/18/20 0919    vitamin D (ERGOCALCIFEROL) capsule 50,000 Units  50,000 Units Oral Weekly Arcadio Sykes MD        sodium chloride flush 0.9 % injection 10 mL  10 mL Intravenous 2 times per day Arcadio Sykes MD   10 mL at 08/17/20 2207    sodium chloride flush 0.9 % injection 10 mL  10 mL Intravenous PRN Arcadio Sykes MD        acetaminophen (TYLENOL) tablet 650 mg  650 mg Oral Q6H PRN Arcadio Sykes MD        Or   [facility] acetaminophen (TYLENOL) suppository 650 mg  650 mg Rectal Q6H PRN Arcadio Sykes MD        polyethylene glycol (GLYCOLAX) packet 17 g  17 g Oral Daily PRN Arcadio Sykes MD       [facility] promethazine (PHENERGAN) tablet 12.5 mg  12.5 mg Oral Q6H PRN Kami Oliva MD        Or    ondansetron [facility]) injection 4 mg  4 mg Intravenous Q6H PRN Kami Oliva MD        cefTRIAXone (ROCEPHIN) 1 g in sterile water 10 mL IV syringe  1 g Intravenous Q24H Kami Oliva MD   1 g at 08/17/20 1207     No Known Allergies    PHYSICAL EXAMINATION:  BP (!) 112/54   Pulse 57   Temp 97.8 °F (36.6 °C) (Oral)   Resp 16   Ht 5' (1.524 m)   Wt 86 lb 3.2 oz (39.1 kg) Comment: with 2 pillows and pump attached to bed  SpO2 96%   BMI 16.83 kg/m²   Appearance: Elderly lady who is thinly built but in no acute distress  Mental Status Exam: Patient is drowsy but would wake up and tell me her name. She is not able to give any more history or participate in mental status testing  Funduscopic Exam: Could not be done due to limited cooperation  Cranial Nerves:   II: Visual fields: Could not be tested  III: Pupils: Equal round reactive to light  III,IV,VI: Extraocular movements seem intact  V: Facial sensation: Corneal reflexes are present  VII: Face appears symmetrical  VIII: Hearing: Could not be assessed  IX: Gag reflex is present  XI: Shoulder strength could not be assessed  XII: Tongue is in the midline  Motor: Moves all 4 extremities spontaneously. Tone is slightly increased. Sensory: Withdraws to painful stimuli  Coordination:   Could not cooperate with testing            Reflexes: Barely elicitable all over  Plantar response:  Withdrawal response bilaterally  Gait: Unable to ambulate  Romberg: Could not be tested  Vascular: No carotid bruit bilaterally        DATA:  LABS:  General Labs:    CBC:   Lab Results   Component Value Date    WBC 12.7 08/18/2020    RBC 4.48 08/18/2020    HGB 14.0 08/18/2020    HCT 42.7 08/18/2020    MCV 95.3 08/18/2020    MCH 31.2 08/18/2020    MCHC 32.7 08/18/2020    RDW 14.8 08/18/2020     08/18/2020    MPV 9.9 08/18/2020     BMP:    Lab Results   Component Value Date     08/18/2020    K 3.3 08/18/2020    CL 98 08/18/2020    CO2 21 08/18/2020    BUN 11 08/18/2020    LABALBU 3.5 08/18/2020    CREATININE 0.6 08/18/2020    CALCIUM 9.1 08/18/2020    GFRAA >60 08/18/2020    LABGLOM >60 08/18/2020    GLUCOSE 98 08/18/2020     RADIOLOGY REVIEW:  I have reviewed radiology image(s) and reports(s) of: CT head    IMPRESSION :  New onset partial complex seizure  This is most likely due to the large meningioma in the brain  CT head images were reviewed and shows a large middle cranial fossa mass on the left side with midline shift. This is probably a large meningioma  Patient Active Problem List   Diagnosis    Essential hypertension    Diabetic nephropathy associated with type 2 diabetes mellitus ([address]    Aortic valve stenosis    Nonrheumatic aortic valve stenosis    Coronary artery disease involving native coronary artery of native heart with angina pectoris ([address]    Coronary artery disease involving native coronary artery of native heart without angina pectoris    S/P TAVR (transcatheter aortic valve replacement)    Mixed hyperlipidemia    Hypercholesteremia    Acute renal failure (HCC)    Acute prerenal azotemia    Closed fracture of first lumbar vertebra (HCC)    Hypovitaminosis D    Age-related osteoporosis with current pathological fracture    Compression fracture of body of thoracic vertebra ([address]    Coronary artery disease due to lipid rich plaque    Brain mass     RECOMMENDATIONS ;  Agree with continuing Keppra to prevent further seizures  Poor prognosis given patient's age and comorbidities  It looks like family has decided on hospice care and is planning to meet with hospice nurse later today  EEG was reviewed and showed diffuse slowing consistent with a diffuse encephalopathy but this can also be seen in postictal state  CT head images were independently reviewed. Thank you for this consultation.           Please note a portion of this chart was generated using dragon dictation software. Although every effort was made to ensure the accuracy of this automated transcription, some errors in transcription may have occurred.

## 2020-08-19 NOTE — DISCHARGE SUMMARY
[facility] DISCHARGE SUMMARY    Patient Demographics    Patient. Aracely Nettles  Date of Birth. 6/26/1930  MRN. [id number]     Primary care provider. Mando Gramajo DO  (Tel: [phone])    Admit date: 8/16/2020    Discharge date (blank if same as Note Date): Note Date: 8/19/2020     Reason for Hospitalization. Chief Complaint   Patient presents with    Altered Mental Status     Pt brought in by [facility] from home. Squad reports patient was found by daughter, daughter reports patient to be more tired than usual. Per squad daughter reports patient to have 'not been drinking a whole lot of fluids. \" Patient current A/O and able to answer questions during triage         Significant Findings. Active Problems:    Brain mass    Partial symptomatic epilepsy with complex partial seizures, not intractable, without status epilepticus ([address]    Meningioma ([facility]  Resolved Problems:    * No resolved hospital problems. *       Problems and results from this hospitalization that need follow up. 1. None    Significant test results and incidental findings. XR CHEST PORTABLE   Final Result   No acute process. CT HEAD WO CONTRAST   Final Result   1. Left middle cranial fossa mass again noted. Given its size increased   density, again this most likely represents a meningioma. There is midline   shift to the right measuring 4 mm. 2. No acute hemorrhage   3. No significant change identified           Invasive procedures and treatments. 1. None     Problem-based Hospital Course.   Patient is a 29-year-old  female with recently diagnosed meningioma, hypertension, CAD who presented with altered mental status and poor oral intake.  CT head showed left fossa brain mass with midline shift.  Neurosurgery and palliative care consulted as per family's request.  Altered mental status:  Likely combination of meningioma with midline shift, dehydration and UTI.  Pt had seizures discharge. Medication List      ASK your doctor about these medications    acetaminophen 500 MG tablet  Commonly known as:  TYLENOL  Take 2 tablets by mouth every 8 hours for 7 days     amLODIPine 5 MG tablet  Commonly known as:  NORVASC  TAKE 1 TABLET BY MOUTH EVERY DAY     aspirin 81 MG chewable tablet  CHEW AND SWALLOW 1 TABLET BY MOUTH DAILY     furosemide 20 MG tablet  Commonly known as:  LASIX  Take 1 tablet by mouth daily as needed (for weight gain more than 2lbs in 2 days)     isosorbide mononitrate 30 MG extended release tablet  Commonly known as:  IMDUR  TAKE 1 TABLET BY MOUTH DAILY     metoprolol succinate 25 MG extended release tablet  Commonly known as:  TOPROL XL  TAKE 1 TABLET BY MOUTH DAILY     potassium chloride 20 MEQ extended release tablet  Commonly known as:  KLOR-CON M  Take 1 tablet by mouth daily as needed (take 1 tablet when she takes lasix)     vitamin D 1.25 MG (29523 UT) Caps capsule  Commonly known as:  ERGOCALCIFEROL  Take 1 capsule by mouth once a week            Discharge recommendations given to patient. Follow Up. pcp in 1 week   Disposition. Hospice in-patient  Activity. activity as tolerated  Diet: DIET DYSPHAGIA PUREED; No Drinking Straw  Dietary Nutrition Supplements: Standard High Calorie Oral Supplement      Spent 50 minutes in discharge process.     Signed:  Martir Thomas MD     8/19/2020 10:30 AM

## 2020-08-19 NOTE — PROGRESS NOTES
Palliative Care:     Hospice [facility] consents have been signed. Patient will be picked up at 12 noon today for [address] [facility] inpatient unit.  Will inform MD.

## 2020-08-19 NOTE — PROGRESS NOTES
Resting quietly in bed, eyes closed, respirations even, responded to verbal stimuli. Oriented to self, denies having any pain. VSS. Assessment completed, see flow charts. No distress noted. tia

## 2020-08-21 NOTE — PROGRESS NOTES
Physician Progress Note      PATIENT:               Zan Herndon  CSN #:                  [id number]  :                       1930  ADMIT DATE:       2020 12:55 PM  [address] [place] DATE:        2020 12:15 PM  RESPONDING  PROVIDER #:        Eli Miller MD          QUERY TEXT:    Pt admitted with Altered Mental Status. Pt noted to have UTI and Brain mass. If possible, please document in the progress notes and discharge summary if   you are evaluating and / or treating any of the following: The medical record reflects the following:  Risk Factors: 80 y.o. female who presented because of weakness, decreased   responsiveness. She does have a brain mass, and UTI. Clinical Indicators: pt sleepy but arousable, oriented to person only, per   daughter reports patient to have 'not been drinking a whole lot of fluids  Treatment: CT head, SLP hill, Neurosurgery consulted, Palliative Care   consulted    Thank you. Please contact me if you have any questions @ [email or web address],   Honey Young RN, CDS  Options provided:  -- Metabolic encephalopathy  -- Septic encephalopathy  -- Toxic metabolic encephalopathy  -- Other - I will add my own diagnosis  -- Disagree - Not applicable / Not valid  -- Disagree - Clinically unable to determine / Unknown  -- Refer to Clinical Documentation Reviewer    PROVIDER RESPONSE TEXT:    Pt has meningioma with mid line shift which is contributing to her altered   mental status. Query created by:  Jonas Michael on 2020 3:01 PM      Electronically signed by:  Eli Miller MD 2020 8:21 AM